# Patient Record
Sex: FEMALE | Race: WHITE | NOT HISPANIC OR LATINO | Employment: FULL TIME | URBAN - METROPOLITAN AREA
[De-identification: names, ages, dates, MRNs, and addresses within clinical notes are randomized per-mention and may not be internally consistent; named-entity substitution may affect disease eponyms.]

---

## 2019-02-03 ENCOUNTER — HOSPITAL ENCOUNTER (EMERGENCY)
Facility: CLINIC | Age: 34
Discharge: PSYCHIATRIC OR SUBSTANCE USE FACILITY | End: 2019-02-03
Attending: EMERGENCY MEDICINE | Admitting: EMERGENCY MEDICINE
Payer: COMMERCIAL

## 2019-02-03 ENCOUNTER — HOSPITAL ENCOUNTER (INPATIENT)
Facility: CLINIC | Age: 34
LOS: 2 days | Discharge: HOME | DRG: 885 | End: 2019-02-05
Attending: PSYCHIATRY & NEUROLOGY | Admitting: PSYCHIATRY & NEUROLOGY
Payer: COMMERCIAL

## 2019-02-03 VITALS
HEART RATE: 106 BPM | RESPIRATION RATE: 20 BRPM | DIASTOLIC BLOOD PRESSURE: 90 MMHG | TEMPERATURE: 100 F | OXYGEN SATURATION: 97 % | SYSTOLIC BLOOD PRESSURE: 112 MMHG

## 2019-02-03 DIAGNOSIS — F32.A DEPRESSION, UNSPECIFIED DEPRESSION TYPE: ICD-10-CM

## 2019-02-03 DIAGNOSIS — F39 EPISODIC MOOD DISORDER (H): ICD-10-CM

## 2019-02-03 DIAGNOSIS — F23 BRIEF PSYCHOTIC DISORDER (H): Primary | ICD-10-CM

## 2019-02-03 PROBLEM — F99 MENTAL HEALTH DISORDER: Status: ACTIVE | Noted: 2019-02-03

## 2019-02-03 LAB
AMPHETAMINES UR QL SCN: NEGATIVE
BARBITURATES UR QL: NEGATIVE
BENZODIAZ UR QL: NEGATIVE
CANNABINOIDS UR QL SCN: NEGATIVE
COCAINE UR QL: NEGATIVE
HCG UR QL: NEGATIVE
OPIATES UR QL SCN: NEGATIVE
PCP UR QL SCN: NEGATIVE

## 2019-02-03 PROCEDURE — 12400001 ZZH R&B MH UMMC

## 2019-02-03 PROCEDURE — 99285 EMERGENCY DEPT VISIT HI MDM: CPT | Mod: 25

## 2019-02-03 PROCEDURE — 80307 DRUG TEST PRSMV CHEM ANLYZR: CPT | Performed by: EMERGENCY MEDICINE

## 2019-02-03 PROCEDURE — 81025 URINE PREGNANCY TEST: CPT | Performed by: EMERGENCY MEDICINE

## 2019-02-03 PROCEDURE — 25000132 ZZH RX MED GY IP 250 OP 250 PS 637: Performed by: EMERGENCY MEDICINE

## 2019-02-03 PROCEDURE — 25000132 ZZH RX MED GY IP 250 OP 250 PS 637: Performed by: PSYCHIATRY & NEUROLOGY

## 2019-02-03 PROCEDURE — 90791 PSYCH DIAGNOSTIC EVALUATION: CPT

## 2019-02-03 RX ORDER — OLANZAPINE 10 MG/2ML
10 INJECTION, POWDER, FOR SOLUTION INTRAMUSCULAR
Status: DISCONTINUED | OUTPATIENT
Start: 2019-02-03 | End: 2019-02-05 | Stop reason: HOSPADM

## 2019-02-03 RX ORDER — TRAZODONE HYDROCHLORIDE 50 MG/1
50 TABLET, FILM COATED ORAL
Status: DISCONTINUED | OUTPATIENT
Start: 2019-02-03 | End: 2019-02-03

## 2019-02-03 RX ORDER — TRAZODONE HYDROCHLORIDE 50 MG/1
50 TABLET, FILM COATED ORAL AT BEDTIME
Status: ON HOLD | COMMUNITY
Start: 2015-12-11 | End: 2019-02-05

## 2019-02-03 RX ORDER — TRAZODONE HYDROCHLORIDE 50 MG/1
50 TABLET, FILM COATED ORAL AT BEDTIME
Status: DISCONTINUED | OUTPATIENT
Start: 2019-02-03 | End: 2019-02-05 | Stop reason: HOSPADM

## 2019-02-03 RX ORDER — ESCITALOPRAM OXALATE 10 MG/1
10 TABLET ORAL
Status: ON HOLD | COMMUNITY
Start: 2017-03-10 | End: 2019-02-05

## 2019-02-03 RX ORDER — ESCITALOPRAM OXALATE 10 MG/1
10 TABLET ORAL AT BEDTIME
Status: DISCONTINUED | OUTPATIENT
Start: 2019-02-03 | End: 2019-02-03

## 2019-02-03 RX ORDER — TRAZODONE HYDROCHLORIDE 50 MG/1
50 TABLET, FILM COATED ORAL AT BEDTIME
Status: DISCONTINUED | OUTPATIENT
Start: 2019-02-03 | End: 2019-02-03

## 2019-02-03 RX ORDER — TRAZODONE HYDROCHLORIDE 50 MG/1
50 TABLET, FILM COATED ORAL AT BEDTIME
Status: DISCONTINUED | OUTPATIENT
Start: 2019-02-03 | End: 2019-02-03 | Stop reason: HOSPADM

## 2019-02-03 RX ORDER — OLANZAPINE 10 MG/1
10 TABLET, ORALLY DISINTEGRATING ORAL
Status: COMPLETED | OUTPATIENT
Start: 2019-02-03 | End: 2019-02-03

## 2019-02-03 RX ORDER — OLANZAPINE 10 MG/2ML
10 INJECTION, POWDER, FOR SOLUTION INTRAMUSCULAR
Status: COMPLETED | OUTPATIENT
Start: 2019-02-03 | End: 2019-02-03

## 2019-02-03 RX ORDER — ESCITALOPRAM OXALATE 10 MG/1
10 TABLET ORAL DAILY
Status: DISCONTINUED | OUTPATIENT
Start: 2019-02-04 | End: 2019-02-05 | Stop reason: HOSPADM

## 2019-02-03 RX ORDER — BISACODYL 10 MG
10 SUPPOSITORY, RECTAL RECTAL DAILY PRN
Status: DISCONTINUED | OUTPATIENT
Start: 2019-02-03 | End: 2019-02-05 | Stop reason: HOSPADM

## 2019-02-03 RX ORDER — ALUMINA, MAGNESIA, AND SIMETHICONE 2400; 2400; 240 MG/30ML; MG/30ML; MG/30ML
30 SUSPENSION ORAL EVERY 4 HOURS PRN
Status: DISCONTINUED | OUTPATIENT
Start: 2019-02-03 | End: 2019-02-05 | Stop reason: HOSPADM

## 2019-02-03 RX ORDER — HYDROXYZINE HYDROCHLORIDE 25 MG/1
25 TABLET, FILM COATED ORAL EVERY 4 HOURS PRN
Status: DISCONTINUED | OUTPATIENT
Start: 2019-02-03 | End: 2019-02-05 | Stop reason: HOSPADM

## 2019-02-03 RX ORDER — OLANZAPINE 10 MG/1
10 TABLET ORAL
Status: DISCONTINUED | OUTPATIENT
Start: 2019-02-03 | End: 2019-02-05 | Stop reason: HOSPADM

## 2019-02-03 RX ORDER — CITALOPRAM HYDROBROMIDE 10 MG/1
10 TABLET ORAL DAILY
Status: DISCONTINUED | OUTPATIENT
Start: 2019-02-04 | End: 2019-02-03

## 2019-02-03 RX ORDER — ACETAMINOPHEN 325 MG/1
650 TABLET ORAL EVERY 4 HOURS PRN
Status: DISCONTINUED | OUTPATIENT
Start: 2019-02-03 | End: 2019-02-05 | Stop reason: HOSPADM

## 2019-02-03 RX ADMIN — HYDROXYZINE HYDROCHLORIDE 25 MG: 25 TABLET ORAL at 21:53

## 2019-02-03 RX ADMIN — OLANZAPINE 10 MG: 10 TABLET, ORALLY DISINTEGRATING ORAL at 14:44

## 2019-02-03 ASSESSMENT — ACTIVITIES OF DAILY LIVING (ADL)
DRESS: 0-->INDEPENDENT
RETIRED_COMMUNICATION: 0-->UNDERSTANDS/COMMUNICATES WITHOUT DIFFICULTY
AMBULATION: 0-->INDEPENDENT
SWALLOWING: 0-->SWALLOWS FOODS/LIQUIDS WITHOUT DIFFICULTY
FALL_HISTORY_WITHIN_LAST_SIX_MONTHS: NO
DRESS: INDEPENDENT
TOILETING: 0-->INDEPENDENT
BATHING: 0-->INDEPENDENT
HYGIENE/GROOMING: INDEPENDENT
COGNITION: 0 - NO COGNITION ISSUES REPORTED
ORAL_HYGIENE: INDEPENDENT
RETIRED_EATING: 0-->INDEPENDENT
TRANSFERRING: 0-->INDEPENDENT

## 2019-02-03 ASSESSMENT — MIFFLIN-ST. JEOR: SCORE: 1327.76
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
## 2019-02-03 NOTE — ED PROVIDER NOTES
Patient signed out to myself awaiting admission.  There is a bed over at [place].  Nurse notes that she is been having abdominal pain.  I reviewed her chart did not see any mention of abdominal pain or abdominal workups.  Went back to see her with her  at her bedside.  Patient appears quite sleepy with a very flat affect.  She is vague with her history of abdominal pain and is unclear as to how long it has been going on.  She denies any melena or coffee-ground emesis denies diarrhea or constipation but again is quite sleepy when answering questions.  On exam she has some mild left-sided abdominal pain.  She notes that food seems to help her symptoms.  She denies any fatty greasy food intolerance.  She had no prior surgeries.  She is not having significant symptoms right now do not feel she needs an abdominal workup at this time.  She was given a courtesy meal and I see a good portion of it has been eaten.  Urine pregnancy will be added on.     Rusty Max MD  04/03/19 7827

## 2019-02-03 NOTE — ED NOTES
PT c/o abd pain at this time. Pt reports has had it off and on for quite a while. MD notified and at bedside.

## 2019-02-04 LAB
ALBUMIN SERPL-MCNC: 4 G/DL (ref 3.4–5)
ALP SERPL-CCNC: 43 U/L (ref 40–150)
ALT SERPL W P-5'-P-CCNC: 15 U/L (ref 0–50)
ANION GAP SERPL CALCULATED.3IONS-SCNC: 6 MMOL/L (ref 3–14)
AST SERPL W P-5'-P-CCNC: 17 U/L (ref 0–45)
B-HCG SERPL-ACNC: <1 IU/L (ref 0–5)
BASOPHILS # BLD AUTO: 0 10E9/L (ref 0–0.2)
BASOPHILS NFR BLD AUTO: 0.5 %
BILIRUB SERPL-MCNC: 0.7 MG/DL (ref 0.2–1.3)
BUN SERPL-MCNC: 12 MG/DL (ref 7–30)
CALCIUM SERPL-MCNC: 8.6 MG/DL (ref 8.5–10.1)
CHLORIDE SERPL-SCNC: 107 MMOL/L (ref 94–109)
CHOLEST SERPL-MCNC: 176 MG/DL
CO2 SERPL-SCNC: 27 MMOL/L (ref 20–32)
CREAT SERPL-MCNC: 0.8 MG/DL (ref 0.52–1.04)
DIFFERENTIAL METHOD BLD: NORMAL
EOSINOPHIL # BLD AUTO: 0.1 10E9/L (ref 0–0.7)
EOSINOPHIL NFR BLD AUTO: 1.3 %
ERYTHROCYTE [DISTWIDTH] IN BLOOD BY AUTOMATED COUNT: 12.2 % (ref 10–15)
GFR SERPL CREATININE-BSD FRML MDRD: >90 ML/MIN/{1.73_M2}
GLUCOSE SERPL-MCNC: 95 MG/DL (ref 70–99)
HCT VFR BLD AUTO: 44.3 % (ref 35–47)
HDLC SERPL-MCNC: 60 MG/DL
HGB BLD-MCNC: 14.7 G/DL (ref 11.7–15.7)
IMM GRANULOCYTES # BLD: 0 10E9/L (ref 0–0.4)
IMM GRANULOCYTES NFR BLD: 0 %
LDLC SERPL CALC-MCNC: 102 MG/DL
LYMPHOCYTES # BLD AUTO: 2.1 10E9/L (ref 0.8–5.3)
LYMPHOCYTES NFR BLD AUTO: 34.1 %
MCH RBC QN AUTO: 30.2 PG (ref 26.5–33)
MCHC RBC AUTO-ENTMCNC: 33.2 G/DL (ref 31.5–36.5)
MCV RBC AUTO: 91 FL (ref 78–100)
MONOCYTES # BLD AUTO: 0.4 10E9/L (ref 0–1.3)
MONOCYTES NFR BLD AUTO: 6.3 %
NEUTROPHILS # BLD AUTO: 3.6 10E9/L (ref 1.6–8.3)
NEUTROPHILS NFR BLD AUTO: 57.8 %
NONHDLC SERPL-MCNC: 116 MG/DL
NRBC # BLD AUTO: 0 10*3/UL
NRBC BLD AUTO-RTO: 0 /100
PLATELET # BLD AUTO: 224 10E9/L (ref 150–450)
POTASSIUM SERPL-SCNC: 4.2 MMOL/L (ref 3.4–5.3)
PROT SERPL-MCNC: 7.2 G/DL (ref 6.8–8.8)
RBC # BLD AUTO: 4.87 10E12/L (ref 3.8–5.2)
SODIUM SERPL-SCNC: 140 MMOL/L (ref 133–144)
TRIGL SERPL-MCNC: 72 MG/DL
TSH SERPL DL<=0.005 MIU/L-ACNC: 2.31 MU/L (ref 0.4–4)
VIT B12 SERPL-MCNC: 475 PG/ML (ref 193–986)
WBC # BLD AUTO: 6.2 10E9/L (ref 4–11)

## 2019-02-04 PROCEDURE — G0177 OPPS/PHP; TRAIN & EDUC SERV: HCPCS

## 2019-02-04 PROCEDURE — 82607 VITAMIN B-12: CPT | Performed by: PSYCHIATRY & NEUROLOGY

## 2019-02-04 PROCEDURE — 80061 LIPID PANEL: CPT | Performed by: PSYCHIATRY & NEUROLOGY

## 2019-02-04 PROCEDURE — 12400001 ZZH R&B MH UMMC

## 2019-02-04 PROCEDURE — 36415 COLL VENOUS BLD VENIPUNCTURE: CPT | Performed by: PSYCHIATRY & NEUROLOGY

## 2019-02-04 PROCEDURE — 80053 COMPREHEN METABOLIC PANEL: CPT | Performed by: PSYCHIATRY & NEUROLOGY

## 2019-02-04 PROCEDURE — 84702 CHORIONIC GONADOTROPIN TEST: CPT | Performed by: PSYCHIATRY & NEUROLOGY

## 2019-02-04 PROCEDURE — 25000132 ZZH RX MED GY IP 250 OP 250 PS 637: Performed by: PSYCHIATRY & NEUROLOGY

## 2019-02-04 PROCEDURE — 85025 COMPLETE CBC W/AUTO DIFF WBC: CPT | Performed by: PSYCHIATRY & NEUROLOGY

## 2019-02-04 PROCEDURE — 82306 VITAMIN D 25 HYDROXY: CPT | Performed by: PSYCHIATRY & NEUROLOGY

## 2019-02-04 PROCEDURE — 99222 1ST HOSP IP/OBS MODERATE 55: CPT | Mod: AI | Performed by: PSYCHIATRY & NEUROLOGY

## 2019-02-04 PROCEDURE — 84443 ASSAY THYROID STIM HORMONE: CPT | Performed by: PSYCHIATRY & NEUROLOGY

## 2019-02-04 RX ORDER — QUETIAPINE FUMARATE 50 MG/1
100 TABLET, FILM COATED ORAL
Status: DISCONTINUED | OUTPATIENT
Start: 2019-02-04 | End: 2019-02-05 | Stop reason: HOSPADM

## 2019-02-04 RX ADMIN — TRAZODONE HYDROCHLORIDE 50 MG: 50 TABLET ORAL at 21:22

## 2019-02-04 RX ADMIN — HYDROXYZINE HYDROCHLORIDE 25 MG: 25 TABLET ORAL at 21:24

## 2019-02-04 RX ADMIN — ESCITALOPRAM OXALATE 10 MG: 10 TABLET ORAL at 09:05

## 2019-02-04 ASSESSMENT — ACTIVITIES OF DAILY LIVING (ADL)
HYGIENE/GROOMING: INDEPENDENT
LAUNDRY: WITH SUPERVISION
DRESS: STREET CLOTHES;SCRUBS (BEHAVIORAL HEALTH)
ORAL_HYGIENE: INDEPENDENT

## 2019-02-04 NOTE — PROGRESS NOTES
"SPIRITUAL HEALTH SERVICES  SPIRITUAL ASSESSMENT Progress Note  [facility]     REFERRAL SOURCE: Patient    I met with Fiorella. She said she originally requested to meet with a  because she \"wasn't feeling good when she first arrived. I'm feeling much better now, so I really don't need to talk.\"  I shared with her that I was happy she was feeling better and should she change her mind about wishing to talk for any reason, just let her nurse know.    PLAN: [facility] will continue to be available for spiritual support while the patient is in the hospital.    Brinda Izquierdo  Chaplain Resident  Pager [phone]    "

## 2019-02-04 NOTE — PLAN OF CARE
"  INITIAL OT NOTE    Groups Attended: OT Clinic x2, Wellness    Affect/Hygiene/Presentation: Pleasant mood with a congruent affect, however Pt appeared to be slightly guarded. No apparent hygiene concerns.     Patient Performance/Response:  -Clinic: Pt actively participated in occupational therapy clinic. Pt was able to ask for assistance as needed, and independently initiate a familiar, creative expression task after initial orientation to clinic materials was provided by writer. Pt demonstrated good focus and planning during task completion. Pt appeared comfortable minimally interacting with peers and writer when conversation was initiated with her. However, she generally kept to herself and appeared guarded.   -Wellness: Pt actively participated in a mental health management group with a focus on coping through movement to facilitate relaxation and stress management via chair yoga. Pt followed and engaged in the yoga poses, and verbalized feeling calmer at the end of group. Pt contributed at least one idea to a discussion at the end of group regarding the benefits of exercise, stretching, and deep breathing.     Plan: Will continue to assess, initial assessment and OT care plan/goals to be initiated upon additional group participation.       OT SELF-ASSESSMENT  Pt completed a self-assessment form this date. OT staff reviewed with Pt and explained the value of having them involved in their treatment plan, and provided options to meet current needs/self-identified goals.     What do you do during the day/evening?   \"During day - work, [employer], make soap\"    What stressors do you face that trigger symptoms/use?  \"Overworking, forgetting to take care of myself\"    Who are supportive people who you enjoy spending time with?  \"My family, , co-workers, friends\"    What are your positive qualities/strengths?  \"Hobbies - soap making, organized, getting along well with others\"    What helps you to calm down or " "relax?  \"Exercise, hobbies\"    Please select coping skills that you would like to explore in OT:  -Guided relaxation  -Physical wellness/exercise/yoga/Djiboutian chi  -Arts and crafts/woodworking    What are your goals for when you leave the hospital?  \"Manage work stress\"    Please select goals that you would like to work on in OT to reach your goal:  -Engage in OT group activities that support recovery  -Communicate needs effectively     "

## 2019-02-04 NOTE — PLAN OF CARE
"Pt has been in the milieu all shift. Pt went to several groups. Pleasant and cooperative. Pt hopes to discharge home within the next couple days. Pt lives with her . Pt states the date is Monday feb 4th and that she lives in [place]. Polite. Rates depression and anxiety 4/10 with 10 being high and states she \"feels pretty good\" Pt states her concentration is \"good\" and has been looking at a magazine throughout the shift. Pt feels hopeful. Denies SI and SIB. Appetite and sleep are good. Denies pain.  Poor grooming.   "

## 2019-02-04 NOTE — PLAN OF CARE
BEHAVIORAL TEAM DISCUSSION    Participants: Gloria TAYLOR RN, Jessica JOSEPH and Shon Hills MD  Progress: Initial behavioral team discussion.   Continued Stay Criteria/Rationale: Pt admitted on 72 hour hold (expires 2/6/19 @ 17:36 pm) due to psychotic symptomology.   Medical/Physical: See medical consult.   Precautions:   Behavioral Orders   Procedures    Code 1 - Restrict to Unit    Routine Programming     As clinically indicated    Sexual precautions     Pt made sexually inappropriate comments at the [facility] ED.    Status 15     Every 15 minutes.     Plan: The plan is to assess the patient for mental health and medication needs.  The patient will be prescribed medications to treat the identified symptoms.  Upon discharge the patient will be referred to services as appropriate based on the assessment.  Rationale for change in precautions or plan: No change, initial plan of care.

## 2019-02-04 NOTE — PROGRESS NOTES
02/03/19 2145   Patient Belongings   Did you bring any home meds/supplements to the hospital?  No   Patient Belongings locker   Patient Belongings Put in Hospital Secure Location (Security or Locker, etc.) clothing;shoes   Belongings Search Yes   Clothing Search Yes   Second Staff Josy Kelley w/ strings  Leggings  Socks  Boots   A               Admission:  I am responsible for any personal items that are not sent to the safe or pharmacy.  [facility] is not responsible for loss, theft or damage of any property in my possession.    Signature:  _________________________________ Date: _______  Time: _____                                              Staff Signature:  ____________________________ Date: ________  Time: _____      2nd Staff person, if patient is unable/unwilling to sign:    Signature: ________________________________ Date: ________  Time: _____     Discharge:  [facility] has returned all of my personal belongings:    Signature: _________________________________ Date: ________  Time: _____                                          Staff Signature:  ____________________________ Date: ________  Time: _____

## 2019-02-04 NOTE — ED NOTES
Attempted to call report to RN at [place]. Unable to take report at this time. Pt and family updated.

## 2019-02-04 NOTE — PROGRESS NOTES
As of 2/4/19 @ 12:45 pm Pt has signed in as voluntary. ROIs have been signed for  and psychiatry provider [facility].     Writer spoke with Daniel, Pt's  ([phone]) discussed Pt admission. Writer explained that Pt will meet with doctor today, and we should know more about a treatment plan later today or tomorrow. Agreed to check-in with Daniel tomorrow. Provided direct contact information for any further questions or concerns.

## 2019-02-04 NOTE — PLAN OF CARE
Fiorella is a 33 year-old female who is admitted from the [facility] ED due to worsening depression and psychosis. According to report from the ED nurse and chart review,  Fiorella was bib  and sister due to psychotic behavior.  Pt has been decompensating for a week now- not sleeping well.  Got lost on the way home from the gym and needed guidance from .  Pt is taking awhile to process thoughts and often doesn't answer questions, rather she stares and smiles. When she responds she often answers inappropriately to the question. Took a double dose of trazodone so she could sleep but woke up and was wandering and pacing around the house.  Pt had similar episode a few years ago and was hospitalized at [facility] diagnosed with MDD with psychotic features.      Upon admission pt presents with a depressed mood and blunted affect. Denies suicidal ideation or suicide attempt. Pt presents with thought blocking, memory loss and tearfulness. Is disheveled. Pt unable to recall her own address and her  phone number at this time. Pt cooperative and polite.  Plan: Status 15s; Build trust with pt. Continue to build on strengths. Encourage healthy coping.     Admission Notification:     Her  ( Daniel) filled out the concerned person form for Team.
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
## 2019-02-05 VITALS
BODY MASS INDEX: 24.24 KG/M2 | HEART RATE: 89 BPM | HEIGHT: 64 IN | TEMPERATURE: 97.3 F | RESPIRATION RATE: 16 BRPM | SYSTOLIC BLOOD PRESSURE: 129 MMHG | DIASTOLIC BLOOD PRESSURE: 86 MMHG | WEIGHT: 142 LBS | OXYGEN SATURATION: 98 %

## 2019-02-05 LAB — DEPRECATED CALCIDIOL+CALCIFEROL SERPL-MC: 34 UG/L (ref 20–75)

## 2019-02-05 PROCEDURE — G0177 OPPS/PHP; TRAIN & EDUC SERV: HCPCS

## 2019-02-05 PROCEDURE — 25000132 ZZH RX MED GY IP 250 OP 250 PS 637: Performed by: PSYCHIATRY & NEUROLOGY

## 2019-02-05 RX ORDER — TRAZODONE HYDROCHLORIDE 50 MG/1
50 TABLET, FILM COATED ORAL AT BEDTIME
Qty: 30 TABLET | Refills: 1 | Status: ON HOLD | OUTPATIENT
Start: 2019-02-05 | End: 2019-04-08

## 2019-02-05 RX ORDER — HYDROXYZINE HYDROCHLORIDE 25 MG/1
25 TABLET, FILM COATED ORAL EVERY 4 HOURS PRN
Qty: 60 TABLET | Refills: 1 | Status: ON HOLD | OUTPATIENT
Start: 2019-02-05 | End: 2019-04-08

## 2019-02-05 RX ORDER — QUETIAPINE FUMARATE 25 MG/1
12.5 TABLET, FILM COATED ORAL 3 TIMES DAILY PRN
Qty: 15 TABLET | Refills: 1 | Status: ON HOLD | OUTPATIENT
Start: 2019-02-05 | End: 2019-04-08

## 2019-02-05 RX ORDER — ESCITALOPRAM OXALATE 10 MG/1
10 TABLET ORAL DAILY
Qty: 30 TABLET | Refills: 1 | Status: SHIPPED | OUTPATIENT
Start: 2019-02-05 | End: 2019-03-23

## 2019-02-05 RX ADMIN — ESCITALOPRAM OXALATE 10 MG: 10 TABLET ORAL at 08:30

## 2019-02-05 RX ADMIN — HYDROXYZINE HYDROCHLORIDE 25 MG: 25 TABLET ORAL at 18:48

## 2019-02-05 ASSESSMENT — ACTIVITIES OF DAILY LIVING (ADL)
LAUNDRY: WITH SUPERVISION
DRESS: STREET CLOTHES
ORAL_HYGIENE: INDEPENDENT
HYGIENE/GROOMING: INDEPENDENT

## 2019-02-05 ASSESSMENT — MIFFLIN-ST. JEOR: SCORE: 1334.11

## 2019-02-05 NOTE — PROGRESS NOTES
02/04/19 1938   Behavioral Health   Hallucinations denies / not responding to hallucinations   Thinking poor concentration   Orientation time: oriented;date: oriented;place: oriented   Memory baseline memory   Insight insight appropriate to events   Judgement impaired   Eye Contact at examiner   Affect blunted, flat   Mood mood is calm   Physical Appearance/Attire attire appropriate to age and situation   Hygiene neglected grooming - unclean body, hair, teeth   Suicidality (denied )   1. Wish to be Dead No   2. Non-Specific Active Suicidal Thoughts  No   Self Injury (denied )   Elopement (denied )   Activity withdrawn;isolative   Speech coherent;clear   Medication Sensitivity no observed side effects;no stated side effects   Psychomotor / Gait balanced;steady   Psycho Education   Type of Intervention 1:1 intervention   Response participates, initiates socially appropriate   Hours 0.5   Activities of Daily Living   Hygiene/Grooming independent   Oral Hygiene independent   Dress street clothes;scrubs (behavioral health)   Laundry with supervision   Room Organization independent   Activity   Activity Assistance Provided independent     Pt was calm and cooperative with blunted mood affect, isolative and withdrawn from peers. She was visibile in milieu watching tv. And reading. She denied all mental health symptoms including SI/SIB. She had one visitor. She stated that she is feeling much better than the past a few days, but she thinks she is not ready to discharge yet.  Her appetite was good and sleeping and napping well.

## 2019-02-05 NOTE — PLAN OF CARE
"  OT General Care Plan  OT Goal 1  Description  Within 1 week, Pt will communicate needs effectively >2x for increased assertive communication.    2/5/2019 1429 - Improving by Xenia Pelayo OT     Groups Attended: OT Clinic, Life Skills, Mental Health Management     Affect/Hygiene/Presentation: Pleasant mood, engaged, congruent affect. No apparent hygiene concerns.     Patient Performance/Response:  -Clinic: Pt actively participated in occupational therapy clinic. Pt was able to ask for assistance as needed, and independently initiate a novel, goal-directed task without difficulty. Pt demonstrated good focus, planning, and problem solving during task completion. Pt appeared comfortable interacting with peers and writer, however generally kept to herself and appeared focused on her selected task.  -Life Skills: Pt actively participated in a mental health management group involving identification of coping skills beginning with every letter of the alphabet facilitated through group discussion. Pt consistently contributed ideas of positive coping skills, and was receptive and respectful of ideas contributed by peers. Pt identified coping skills that currently work for her including \"deep breathing\" and that she would like to try to utilize \"the 30681 grounding exercise, and making a short list for the day\".   -Mental Health Management: Pt actively participated in a structured occupational therapy group with a focus on a visual-spatial leisure task. Pt was able to follow 2-step directions of the novel task, and demonstrated strategic planning and problem solving throughout task. Pt remained focused for full duration of group and assisted peers as needed to succeed during task.    Goal Progress: Goal initiated this date based on Pt's self-identified needs/goals for discharge.     Plan: Pt will be encouraged to maintain group attendance for continued ongoing assessment and support in completion of occupational " therapy treatment goals.

## 2019-02-05 NOTE — PROGRESS NOTES
Pt said that she wants to be discharged today. Pt said depression and anxiety are much less and denies SO or SIB     02/05/19 1207   Sleep/Rest/Relaxation   Sleep/Rest/Relaxation no problem identified   Behavioral Health   Hallucinations denies / not responding to hallucinations   Thinking intact   Orientation time: oriented;place: oriented;person: oriented;date: oriented   Memory baseline memory   Insight insight appropriate to situation   Judgement intact   Eye Contact at examiner   Affect full range affect   Mood mood is calm   Physical Appearance/Attire neat   Hygiene well groomed   Suicidality other (see comments)  (denies)   Self Injury other (see comment)  (denies)   Elopement (no)   Activity restless   Speech clear;coherent   Psychomotor / Gait balanced;steady   Coping/Psychosocial   Verbalized Emotional State hopefulness;happiness;relief;acceptance   Coping/Psychosocial Interventions   Supportive Measures verbalization of feelings encouraged   Psycho Education   Type of Intervention 1:1 intervention   Response participates with encouragement   Hours 0.5   Treatment Detail (encouragement and acceptance)   Safety   Suicidality Status 15   Activities of Daily Living   Hygiene/Grooming independent   Oral Hygiene independent   Dress street clothes   Laundry with supervision   Room Organization independent   Behavioral Health Interventions   Social and Therapeutic Interventions (Psychotic Symptoms) encourage socialization with peers;encourage participation in therapeutic groups and milieu activities

## 2019-02-05 NOTE — PROGRESS NOTES
Case Management Note  2/5/2019    Spoke with pt's  Daniel ([phone]). Daniel had left [facility] requesting a call. Daniel reported he noticed symptoms beginning late last week. Daniel reported he noticed disturbed sleep, pt was overwhelmed, etc. Daniel requested information on how to respond to this in the future. Daniel reported he told pt what he was noticing, but he didn't think it helped, rather made her feel more overhwhelmed. Writer validated Daniel's concerns, and normalized Daniel's experience that pointing out pt's symptoms only added to stress. Writer suggested pt,  and anyone else supportive (mom, sister) work with therapist and/or psychiatrist to create action plan and all agree to it. Writer also discussed this generally with pt. Writer suggested to have an action plan next time symptoms arise so pt feels she has a plan to address the symptoms rather than feeling it is just another problem added to her plate when she is overwhelmed. Daniel indicated understanding. Daniel reported he can't pick pt up until after 5:30, felt okay with time of 6:30 for pickup.     Writer spoke with pt about discharge. Writer offered to make pt's next therapy appointment, pt felt she could do it herself. Pt understood the above information re: creating action plan. Writer encouraged pt to maintain awareness of sleep/eating habits, as these both became disturbed shortly prior to pt's hospitalization this time as well as her previous hospitalization. Pt reported that prior to both hospitalizations she was triggered by covering for her boss, feeling stressed, overwhelmed, not eating lunch, not sleeping. Pt reported Trazodone was ineffective at these times. Writer encouraged pt to discuss these issues with her psychiatrist. Writer offered to have pt's psychiatry appointment moved up (it had been scheduled prior to admission for 3/18), pt declined. Writer discussed pt's plan to go back to work. Pt reported she wants to return  to work, but reports she understands she maybe shouldn't rush back. Pt reported her work is understanding. Writer will provide pt with letter verifying pt's admission and excusing pt from work until 2/7/19.     Maria E Escalante LPC, [facility]

## 2019-02-05 NOTE — CONSULTS
Consult Date:  02/04/2019      CHIEF COMPLAINT:  A 33-year-old woman admitted with symptoms of psychosis.      HISTORY OF PRESENT ILLNESS:  The patient is a 33-year-old woman who presents for her third hospitalization.  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  She was highly stressed.  She was not sleeping well at night, and she began to develop some psychotic symptoms.  She was disorganized.  At one point in time, she called her  saying she got lost and needed help.  She was making paranoid statements while they were driving in the car.  In the emergency room, she was somewhat evasive, answering questions in odd ways, or else not answering them at all.  She was placed on a hold and admitted to the psychiatric unit.      When I meet with the patient, she appeared somewhat anxious, but overall was doing much better.  She was clear.  She was fully oriented.  She does not really recall all the details in the last several days, but does know that she was feeling like her thoughts were a little bit jumbled.  She said this is the third time this has happened to her.  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  She thought she prepared better this time, but it turns out that she was not recovered.  Her first hospitalization, she was initially given risperidone, but the cost of this medication was not covered at hospital discharge, so she was switched to Seroquel, but she did not take it very long.  At her last hospitalization, she was prescribed Geodon.  She ended up staying on that for about 6 months and then tapered off.  Has had no problems in the last 2 years until just now.  She reports that her trazodone was not working for sleep these last several days.      PAST PSYCHIATRIC HISTORY:  As  noted in HPI.  This is her third hospitalization.  Denies ever having a suicide attempt.      PAST MEDICAL HISTORY:  The patient denies any chronic or acute medical issues.      SUBSTANCE HISTORY:  The patient denies tobacco, alcohol, or illicit substance use.      PHYSICAL REVIEW OF SYSTEMS:  The patient currently denies problems on a 10-point review of systems except as noted in HPI.      FAMILY HISTORY:  She denies any history of mental illness.      SOCIAL HISTORY:  The patient is .  She has no children.  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  She indicates that there are about 60 people working there total.  She states that she is effectively covering 2 jobs right now while her supervisor is gone.      ALLERGIES:  NO KNOWN DRUG ALLERGIES.      PRIOR TO ADMISSION MEDICATIONS:  Lexapro 10 mg at night, trazodone 50 mg at bedtime.      LABORATORY RESULTS:  Comprehensive metabolic panel within normal limits.  Urine pregnancy is negative.  Lipid panel largely within normal limits.  TSH is 2.31.  Glucose is 95.  CBC with differential within normal limits.  Urine toxicology is negative for amphetamines, cocaine, opiates, cannabinoids, PCP, benzodiazepines.      VITAL SIGNS:  Temperature 97.6, pulse of 82, respiratory rate 16, blood pressure 122/66, oxygen saturation 97% on room air.      PHYSICAL EXAMINATION:  I reviewed physical exam as documented by emergency room physician, Juno Cintron dated 2/3/2019.  I have no additional findings at this time.      MENTAL HEALTH STATUS EXAMINATION:  The patient is alert and oriented to person, place, and date.  She is casually dressed with appropriate grooming.  She has good eye contact.  She is cooperative to interview.  Speech is normal in rate and volume.  Language is intact for word usage and sentence structure.  Mood was mildly anxious, otherwise euthymic.  Affect is congruent to mood.  She has  no psychomotor agitation or retardation.  Muscle strength and tone and gait and station are all within normal limits.  Thought process is linear and goal oriented.  Associations are intact.  Thought content currently negative for suicidal or homicidal thoughts.  No signs of psychosis.  The patient denies hallucinations.  Recent and remote memory are somewhat impaired surrounding the last few days, otherwise intact.  Fund of knowledge is adequate.  Attention and concentration intact.  Insight intact, judgment intact.      DIAGNOSES:   1.  Brief psychotic episode consistent with picture of delirium.   2.  History of depression.      PLAN:   1.  Will continue patient's prior to admission Lexapro and trazodone.  We will add Seroquel to be available as needed at 100 mg, should the trazodone be inadequate for her sleep.   2.  Olanzapine is available as needed for psychosis.     3.  Legal: The patient was admitted on a 72-hour hold.  Prior to me seeing the patient, nursing staff had the patient sign in voluntarily.  Currently, she appears to have the capacity to do so, thus will leave her at this status at this time.   4.  Disposition:  I anticipate patient will discharge to home after some more observation and more collateral is obtained, likely a 3-4 day hospitalization, should her current picture continue.         DOMENICO MILLER MD             D: 2019   T: 2019   MT: LORIE      Name:     RYANNE PALACIO   MRN:      [id number]        Account:       [id number]   :      1985           Consult Date:  2019      Document: [id number]

## 2019-02-05 NOTE — PROGRESS NOTES
INITIAL OT ASSESSMENT     02/04/19 8327   General Information   Date Initially Attended OT 02/04/19   Clinical Impression   Affect Fluctuates;Appropriate to situation;Anxious;Flat   Orientation Oriented to person, place and time   Appearance and ADLs Neatly groomed   Attention to Internal Stimuli No observed signs   Interaction Skills Interacts appropriately with staff;Initiates appropriately with staff;Interacts appropriately with peers;Initiates appropriately with peers   Ability to Communicate Needs Does so with prompts   Verbal Content Appropriate to topic   Ability to Maintain Boundaries Maintains appropriate physical boundaries;Maintains appropriate verbal boundaries   Participation Initiates participation;Independently participates   Concentration Concentrates 50 minutes   Ability to Concentrate Without difficulty   Follows and Comprehends Directions Independently follows multi-step directions   Memory Delayed and immediate recall intact   Organization Independently organizes all tasks   Decision Making Independent   Planning and Problem Solving Occasionally needs assist/feedback   Ability to Apply and Learn Concepts Applies within group structure   Frustrations / Stress Tolerance Independently identifies skills    Level of Insight Identifies needs with structure/support   Self Esteem Can identify positives;Takes risks with support and encouragement;Accepts positive feedback   Social Supports Has knowledge of support systems

## 2019-02-06 NOTE — PLAN OF CARE
Patient discharged at this time to home. Reviewed and verbalized understanding of discharge instructions. Pt left with copy of AVS and all her belongings. [facility] [place]

## 2019-02-08 NOTE — H&P
Admitted:     02/03/2019      Revised      DATE OF ADMISSION:  2/3/2019      DATE OF SERVICE:  2/4/2019      CHIEF COMPLAINT:  A 33-year-old woman admitted with symptoms of psychosis.        HISTORY OF PRESENT ILLNESS:  The patient is a 33-year-old woman who presents for her third hospitalization.  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  She was highly stressed.  She was not sleeping well at night, and she began to develop some psychotic symptoms.  She was disorganized.  At one point in time, she called her  saying she got lost and needed help.  She was making paranoid statements while they were driving in the car.  In the emergency room, she was somewhat evasive, answering questions in odd ways, or else not answering them at all.  She was placed on a hold and admitted to the psychiatric unit.        When I meet with the patient, she appeared somewhat anxious, but overall was doing much better.  She was clear.  She was fully oriented.  She does not really recall all the details in the last several days, but does know that she was feeling like her thoughts were a little bit jumbled.  She said this is the third time this has happened to her.  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  She thought she prepared better this time, but it turns out that she was not recovered.  Her first hospitalization, she was initially given risperidone, but the cost of this medication was not covered at hospital discharge, so she was switched to Seroquel, but she did not take it very long.  At her last hospitalization, she was prescribed Geodon.  She ended up staying on that for about 6 months and then tapered off.  Has had no problems in the last 2 years until just now.  She reports that her trazodone was  not working for sleep these last several days.        PAST PSYCHIATRIC HISTORY:  As noted in HPI.  This is her third hospitalization.  Denies ever having a suicide attempt.        PAST MEDICAL HISTORY:  The patient denies any chronic or acute medical issues.        SUBSTANCE HISTORY:  The patient denies tobacco, alcohol, or illicit substance use.        PHYSICAL REVIEW OF SYSTEMS:  The patient currently denies problems on a 10-point review of systems except as noted in HPI.        FAMILY HISTORY:  She denies any history of mental illness.        SOCIAL HISTORY:  The patient is .  She has no children.  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  She indicates that there are about 60 people working there total.  She states that she is effectively covering 2 jobs right now while her supervisor is gone.        ALLERGIES:  NO KNOWN DRUG ALLERGIES.        PRIOR TO ADMISSION MEDICATIONS:  Lexapro 10 mg at night, trazodone 50 mg at bedtime.        LABORATORY RESULTS:  Comprehensive metabolic panel within normal limits.  Urine pregnancy is negative.  Lipid panel largely within normal limits.  TSH is 2.31.  Glucose is 95.  CBC with differential within normal limits.  Urine toxicology is negative for amphetamines, cocaine, opiates, cannabinoids, PCP, benzodiazepines.        VITAL SIGNS:  Temperature 97.6, pulse of 82, respiratory rate 16, blood pressure 122/66, oxygen saturation 97% on room air.        PHYSICAL EXAMINATION:  I reviewed physical exam as documented by emergency room physician, Juno Cintron dated 2/3/2019.  I have no additional findings at this time.        MENTAL HEALTH STATUS EXAMINATION:  The patient is alert and oriented to person, place, and date.  She is casually dressed with appropriate grooming.  She has good eye contact.  She is cooperative to interview.  Speech is normal in rate and volume.  Language is intact for word usage  and sentence structure.  Mood was mildly anxious, otherwise euthymic.  Affect is congruent to mood.  She has no psychomotor agitation or retardation.  Muscle strength and tone and gait and station are all within normal limits.  Thought process is linear and goal oriented.  Associations are intact.  Thought content currently negative for suicidal or homicidal thoughts.  No signs of psychosis.  The patient denies hallucinations.  Recent and remote memory are somewhat impaired surrounding the last few days, otherwise intact.  Fund of knowledge is adequate.  Attention and concentration intact.  Insight intact, judgment intact.        DIAGNOSES:    1.  Brief psychotic episode consistent with picture of delirium.    2.  History of depression.        PLAN:    1.  Will continue patient's prior to admission Lexapro and trazodone.  We will add Seroquel to be available as needed at 100 mg, should the trazodone be inadequate for her sleep.    2.  Olanzapine is available as needed for psychosis.      3.  Legal: The patient was admitted on a 72-hour hold.  Prior to me seeing the patient, nursing staff had the patient sign in voluntarily.  Currently, she appears to have the capacity to do so, thus will leave her at this status at this time.    4.  Disposition:  I anticipate patient will discharge to home after some more observation and more collateral is obtained, likely a 3-4 day hospitalization, should her current picture continue.       Revised work type lg 19               DOMENICO MILLER MD             D: 2019   T: 2019   MT: LORIE      Name:     RYANNE PALACIO   MRN:      [id number]        Account:      [id number]   :      1985        Admitted:     2019                   Document: [id number]

## 2019-02-15 ENCOUNTER — HEALTH MAINTENANCE LETTER (OUTPATIENT)
Age: 34
End: 2019-02-15

## 2019-02-18 NOTE — DISCHARGE SUMMARY
[facility], [place]   Psychiatric Discharge Summary      Fiorella Bartlett MRN# [id number]   Age: 33 year old YOB: 1985     Date of Admission:  2/3/2019  Date of Discharge:  02/05/19  Admitting Physician:  Shon Hills MD  Discharge Physician:  Gonzalo Garg MD         Summary/Hospital Course/Disposition:   Reason for Hospitalization: The patient is a 33-year-old woman who presents for her third hospitalization.  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  She was highly stressed.  She was not sleeping well at night, and she began to develop some psychotic symptoms.  She was disorganized.  At one point in time, she called her  saying she got lost and needed help.  She was making paranoid statements while they were driving in the car.  In the emergency room, she was somewhat evasive, answering questions in odd ways, or else not answering them at all.  She was placed on a hold and admitted to the psychiatric unit.           Hospital Course:   The patient was admitted on a 72 hour hold for the reasons above, however she had signed in voluntarily at the beginning of her hospitalization. Patient's PTA Lexapro and Trazodone was continued. Seroquel was added for sleep on a PRN basis. Patient was open about how her mood and thoughts were dysregualted by her interpersonal/occupational stressors, particularly by her boss. Patient's short stay was subjectively beneficial for her, reporting lessened depressive thoughts. Patient is future oriented and plans to follow up with outpatient provider and continue to go to work.          DIagnoses:   1.  Brief psychotic episode   2.  History of depression.               Labs:   No results found for this or any previous visit (from the past 24 hour(s)).         Consults:   None            Discharge Medications:        Review of your medicines      UNREVIEWED medicines. Ask your doctor about these  medicines      Dose / Directions   hydrOXYzine 25 MG tablet  Commonly known as:  ATARAX  Used for:  Brief psychotic disorder (H), Episodic mood disorder (H)  Ask about: Should I take this medication?      Dose:  25 mg  Take 1 tablet (25 mg) by mouth every 4 hours as needed for anxiety  Quantity:  60 tablet  Refills:  1        START taking      Dose / Directions   QUEtiapine 25 MG tablet  Commonly known as:  SEROquel  Used for:  Brief psychotic disorder (H), Episodic mood disorder (H)      Dose:  12.5 mg  Take 0.5 tablets (12.5 mg) by mouth 3 times daily as needed (Take for hightened anxiety episodes)  Quantity:  15 tablet  Refills:  1        CONTINUE these medicines which may have CHANGED, or have new prescriptions. If we are uncertain of the size of tablets/capsules you have at home, strength may be listed as something that might have changed.      Dose / Directions   escitalopram 10 MG tablet  Commonly known as:  LEXAPRO  This may have changed:  when to take this  Used for:  Brief psychotic disorder (H), Episodic mood disorder (H)      Dose:  10 mg  Take 1 tablet (10 mg) by mouth daily  Quantity:  30 tablet  Refills:  1        CONTINUE these medicines which have NOT CHANGED      Dose / Directions   traZODone 50 MG tablet  Commonly known as:  DESYREL  Used for:  Brief psychotic disorder (H), Episodic mood disorder (H)      Dose:  50 mg  Take 1 tablet (50 mg) by mouth At Bedtime  Quantity:  30 tablet  Refills:  1           Where to get your medicines      These medications were sent to [id number] - [place], [place] - [address]  [address] [facility] [address]    Phone:  [phone]     escitalopram 10 MG tablet    hydrOXYzine 25 MG tablet    QUEtiapine 25 MG tablet    traZODone 50 MG tablet              Mental Status Examination:   The patient is alert and oriented to person, place, and date.  She is casually dressed with appropriate grooming.  She has good eye contact.  She is  cooperative to interview.  Speech is normal in rate and volume.  Language is intact for word usage and sentence structure.  Mood was mildly anxious, otherwise euthymic.  Affect is congruent to mood.  She has no psychomotor agitation or retardation.  Muscle strength and tone and gait and station are all within normal limits.  Thought process is linear and goal oriented.  Associations are intact.  Thought content currently negative for suicidal or homicidal thoughts.  No signs of psychosis.  The patient denies hallucinations.  Recent and remote memory are somewhat impaired surrounding the last few days, otherwise intact.  Fund of knowledge is adequate.  Attention and concentration intact.  Insight intact, judgment intact.               Discharge Plan:   No discharge procedures on file.    - Patient was discharged back home with medications.     Gonzalo Garg MD  [facility]

## 2019-03-23 ENCOUNTER — HOSPITAL ENCOUNTER (EMERGENCY)
Facility: CLINIC | Age: 34
Discharge: NURSING, REHAB OR LONG-TERM CARE | End: 2019-03-24
Attending: EMERGENCY MEDICINE | Admitting: EMERGENCY MEDICINE
Payer: COMMERCIAL

## 2019-03-23 DIAGNOSIS — R41.89 DISORGANIZED THOUGHT PROCESS: ICD-10-CM

## 2019-03-23 DIAGNOSIS — F29 PSYCHOSIS, UNSPECIFIED PSYCHOSIS TYPE (H): ICD-10-CM

## 2019-03-23 DIAGNOSIS — F22 PARANOID BEHAVIOR (H): ICD-10-CM

## 2019-03-23 PROCEDURE — 99285 EMERGENCY DEPT VISIT HI MDM: CPT | Mod: 25

## 2019-03-23 PROCEDURE — 25000132 ZZH RX MED GY IP 250 OP 250 PS 637: Performed by: EMERGENCY MEDICINE

## 2019-03-23 PROCEDURE — 90791 PSYCH DIAGNOSTIC EVALUATION: CPT

## 2019-03-23 RX ORDER — ESCITALOPRAM OXALATE 20 MG/1
20 TABLET ORAL DAILY
Status: ON HOLD | COMMUNITY
End: 2019-04-08

## 2019-03-23 RX ORDER — ACETAMINOPHEN 500 MG
1000 TABLET ORAL ONCE
Status: COMPLETED | OUTPATIENT
Start: 2019-03-23 | End: 2019-03-23

## 2019-03-23 RX ADMIN — ACETAMINOPHEN 1000 MG: 500 TABLET, FILM COATED ORAL at 22:59

## 2019-03-23 SDOH — HEALTH STABILITY: MENTAL HEALTH: HOW OFTEN DO YOU HAVE A DRINK CONTAINING ALCOHOL?: NEVER

## 2019-03-23 ASSESSMENT — ENCOUNTER SYMPTOMS
AGITATION: 1
FEVER: 0
HALLUCINATIONS: 0
CHILLS: 0
CONFUSION: 1
SLEEP DISTURBANCE: 1

## 2019-03-23 ASSESSMENT — MIFFLIN-ST. JEOR: SCORE: 1234.32

## 2019-03-24 ENCOUNTER — HOSPITAL ENCOUNTER (INPATIENT)
Facility: CLINIC | Age: 34
LOS: 16 days | Discharge: HOME | DRG: 885 | End: 2019-04-09
Attending: PSYCHIATRY & NEUROLOGY | Admitting: PSYCHIATRY & NEUROLOGY
Payer: COMMERCIAL

## 2019-03-24 VITALS
RESPIRATION RATE: 16 BRPM | OXYGEN SATURATION: 97 % | TEMPERATURE: 98.4 F | HEIGHT: 64 IN | BODY MASS INDEX: 20.49 KG/M2 | HEART RATE: 96 BPM | SYSTOLIC BLOOD PRESSURE: 121 MMHG | DIASTOLIC BLOOD PRESSURE: 85 MMHG | WEIGHT: 120 LBS

## 2019-03-24 DIAGNOSIS — F25.0 SCHIZOAFFECTIVE DISORDER, BIPOLAR TYPE (H): ICD-10-CM

## 2019-03-24 DIAGNOSIS — F99 MENTAL HEALTH DISORDER: Primary | ICD-10-CM

## 2019-03-24 PROBLEM — Z72.820 SLEEP DEPRIVATION: Status: ACTIVE | Noted: 2019-03-24

## 2019-03-24 LAB — HCG SERPL QL: NEGATIVE

## 2019-03-24 PROCEDURE — 25000132 ZZH RX MED GY IP 250 OP 250 PS 637: Performed by: EMERGENCY MEDICINE

## 2019-03-24 PROCEDURE — 12400001 ZZH R&B MH UMMC

## 2019-03-24 PROCEDURE — 84703 CHORIONIC GONADOTROPIN ASSAY: CPT | Performed by: PSYCHIATRY & NEUROLOGY

## 2019-03-24 PROCEDURE — 36415 COLL VENOUS BLD VENIPUNCTURE: CPT | Performed by: PSYCHIATRY & NEUROLOGY

## 2019-03-24 PROCEDURE — 99207 ZZC DOWN CODE DUE TO SUBSEQUENT EXAM: CPT | Performed by: PSYCHIATRY & NEUROLOGY

## 2019-03-24 PROCEDURE — 25000132 ZZH RX MED GY IP 250 OP 250 PS 637: Performed by: PSYCHIATRY & NEUROLOGY

## 2019-03-24 PROCEDURE — 99221 1ST HOSP IP/OBS SF/LOW 40: CPT | Mod: AI | Performed by: PSYCHIATRY & NEUROLOGY

## 2019-03-24 RX ORDER — OLANZAPINE 10 MG/2ML
5 INJECTION, POWDER, FOR SOLUTION INTRAMUSCULAR
Status: DISCONTINUED | OUTPATIENT
Start: 2019-03-24 | End: 2019-03-24

## 2019-03-24 RX ORDER — ESCITALOPRAM OXALATE 20 MG/1
20 TABLET ORAL DAILY
Status: DISCONTINUED | OUTPATIENT
Start: 2019-03-24 | End: 2019-03-25

## 2019-03-24 RX ORDER — OLANZAPINE 5 MG/1
5 TABLET ORAL
Status: DISCONTINUED | OUTPATIENT
Start: 2019-03-24 | End: 2019-03-27

## 2019-03-24 RX ORDER — ALUMINA, MAGNESIA, AND SIMETHICONE 2400; 2400; 240 MG/30ML; MG/30ML; MG/30ML
30 SUSPENSION ORAL EVERY 4 HOURS PRN
Status: DISCONTINUED | OUTPATIENT
Start: 2019-03-24 | End: 2019-04-09 | Stop reason: HOSPADM

## 2019-03-24 RX ORDER — HYDROXYZINE HYDROCHLORIDE 25 MG/1
25-50 TABLET, FILM COATED ORAL EVERY 4 HOURS PRN
Status: DISCONTINUED | OUTPATIENT
Start: 2019-03-24 | End: 2019-03-27

## 2019-03-24 RX ORDER — OLANZAPINE 5 MG/1
5 TABLET ORAL
Status: DISCONTINUED | OUTPATIENT
Start: 2019-03-24 | End: 2019-03-24

## 2019-03-24 RX ORDER — QUETIAPINE FUMARATE 25 MG/1
25 TABLET, FILM COATED ORAL AT BEDTIME
Status: DISCONTINUED | OUTPATIENT
Start: 2019-03-24 | End: 2019-03-25

## 2019-03-24 RX ORDER — ACETAMINOPHEN 325 MG/1
650 TABLET ORAL EVERY 4 HOURS PRN
Status: DISCONTINUED | OUTPATIENT
Start: 2019-03-24 | End: 2019-04-09 | Stop reason: HOSPADM

## 2019-03-24 RX ORDER — ESCITALOPRAM OXALATE 10 MG/1
10 TABLET ORAL DAILY
Status: DISCONTINUED | OUTPATIENT
Start: 2019-03-24 | End: 2019-03-24 | Stop reason: HOSPADM

## 2019-03-24 RX ORDER — TRAZODONE HYDROCHLORIDE 50 MG/1
50 TABLET, FILM COATED ORAL
Status: DISCONTINUED | OUTPATIENT
Start: 2019-03-24 | End: 2019-03-27

## 2019-03-24 RX ORDER — HYDROXYZINE HYDROCHLORIDE 25 MG/1
25 TABLET, FILM COATED ORAL EVERY 4 HOURS PRN
Status: DISCONTINUED | OUTPATIENT
Start: 2019-03-24 | End: 2019-03-24 | Stop reason: HOSPADM

## 2019-03-24 RX ORDER — TRAZODONE HYDROCHLORIDE 100 MG/1
100 TABLET ORAL AT BEDTIME
Status: DISCONTINUED | OUTPATIENT
Start: 2019-03-24 | End: 2019-03-24 | Stop reason: HOSPADM

## 2019-03-24 RX ORDER — OLANZAPINE 10 MG/2ML
5 INJECTION, POWDER, FOR SOLUTION INTRAMUSCULAR
Status: DISCONTINUED | OUTPATIENT
Start: 2019-03-24 | End: 2019-03-27

## 2019-03-24 RX ADMIN — OLANZAPINE 5 MG: 5 TABLET, FILM COATED ORAL at 20:36

## 2019-03-24 RX ADMIN — OLANZAPINE 5 MG: 5 TABLET, FILM COATED ORAL at 16:12

## 2019-03-24 RX ADMIN — QUETIAPINE 12.5 MG: 25 TABLET, FILM COATED ORAL at 01:14

## 2019-03-24 RX ADMIN — ESCITALOPRAM OXALATE 10 MG: 10 TABLET ORAL at 07:54

## 2019-03-24 RX ADMIN — TRAZODONE HYDROCHLORIDE 50 MG: 50 TABLET ORAL at 20:36

## 2019-03-24 RX ADMIN — TRAZODONE HYDROCHLORIDE 100 MG: 100 TABLET ORAL at 01:14

## 2019-03-24 RX ADMIN — QUETIAPINE FUMARATE 25 MG: 25 TABLET ORAL at 20:36

## 2019-03-24 ASSESSMENT — ACTIVITIES OF DAILY LIVING (ADL)
RETIRED_EATING: 0-->INDEPENDENT
LAUNDRY: UNABLE TO COMPLETE
BATHING: 0-->INDEPENDENT
DRESS: 0-->INDEPENDENT
AMBULATION: 0-->INDEPENDENT
ORAL_HYGIENE: INDEPENDENT
HYGIENE/GROOMING: INDEPENDENT
RETIRED_COMMUNICATION: 0-->UNDERSTANDS/COMMUNICATES WITHOUT DIFFICULTY
TOILETING: 0-->INDEPENDENT
SWALLOWING: 0-->SWALLOWS FOODS/LIQUIDS WITHOUT DIFFICULTY
DRESS: INDEPENDENT
COGNITION: 2 - DIFFICULTY WITH ORGANIZING THOUGHTS
FALL_HISTORY_WITHIN_LAST_SIX_MONTHS: NO
TRANSFERRING: 0-->INDEPENDENT
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
## 2019-03-24 NOTE — ED NOTES
Video Observation initiated, patient informed. Privacy maintained by turning camera off while pt changed into scrubs.    Deloris Collier RN

## 2019-03-24 NOTE — ED NOTES
Plan to transfer to [place] [address] with doctor Hills. Report to be called at midnight to [phone]. Transfer paperwork completed.

## 2019-03-24 NOTE — ED NOTES
Patient continues to have paranoia and forgetfulness. Didn't remember coming to hospital last night and what was going on. Patient and  updated on situation.

## 2019-03-24 NOTE — PROGRESS NOTES
"Patient arrived  3/24/2019 11:38 AM to [address]. Patient expression is Tearful. Patient appears flat and is cooperative and guarded. Patient is disorganized and offers few responses. Patient is able to follow direction and states \"I just wish I were dead\" -patient denies SI plan/intent rogelio for safety on unit.  Patient later offers she is from [place], [place] (family there) and has struggled with move here. Patient has a  and cat at home.     EPIC listed admitting  DX: mental health    Vital signs reviewed related to admission.  /81   Pulse 103   Temp 97.9  F (36.6  C) . Patient denies  pain.     Patient rates depression 10/10* c/o Fatigue or loss of energy  Feelings of worthlessness or excessive/inappropriate guilt  Diminished concentration or indecisiveness  Patient rates anxiety at 10/10* c/o Uncontrolled worrying           Patient denies current or recent suicidal ideation  stating \"I do wish I were dead.\"  SIB denies     HI: denies current or recent homicidal ideation or behaviors.    Patient stated REASON for Admission : \"just hopeless.\"                 Patient stated GOALS for Discharge: \"I don't know.\"     PRECAUTIONS at admit:Suicide, S15  ----ADDED: ELOPEMENT, SEXUAL       15 min checks initiated. Brief orientation to unit provided.     Nursing will continue to monitor.    *Scale is 1-10 (10 is the worst)    NURSING TO DO LIST    -CARE PLAN: ENTER ADULT BEHAVIORAL HEALTH PLAN OF CARE  -CARE PLAN: Enter current concern based on priority (BEH template).   -FLOW SHEETS: EDU, Adult PCS, Safety, VS (enter PAIN-CAPA).  -NURSING NAVIGATOR (ADMIT TAB) for required documentation.  -Orders by ON-CALL                "

## 2019-03-24 NOTE — ED NOTES
"[facility] called and informed writer that they are unable to take patient until qamar morning due to staffing issues.  Addendum: [facility] called again and stated, \"Patient can't come until day shift, possibly evening shift.\"  "

## 2019-03-24 NOTE — ED NOTES
Signed out by Dr. Max at shift change.  She presented with increasing paranoia and difficulty sleeping despite increases in home medications.  Accepted at [facility], anticipated transfer tonight.    1240am - Informed by nursing that [facility] could not accept patient at night due to staffing.  Will be here until the morning.  Will try increasing trazodone dose + seroquel to see if it will help her sleep.    410am - Checked on patient; sleeping.    550am - Still sleeping.  Home medications ordered.  Signed out to Dr. Benito.     Leni Farooq MD  03/24/19 5402

## 2019-03-24 NOTE — ED NOTES
Pt awake and asking where her sister is.  reassured pt that she is at home. Pt completing breakfast menu.
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
## 2019-03-24 NOTE — PHARMACY-ADMISSION MEDICATION HISTORY
Admission medication history interview status for the 3/23/2019  admission is complete. See EPIC admission navigator for prior to admission medications     Medication history source reliability:Moderate    Actions taken by pharmacist (provider contacted, etc):None     Additional medication history information not noted on PTA med list : PTA med list updated per spouse and patient. Spouse mentioned she also takes vitamin D and B12 but not consistently.     Medication reconciliation/reorder completed by provider prior to medication history? No    Time spent in this activity: 15 minutes    Prior to Admission medications    Medication Sig Last Dose Taking? Auth Provider   escitalopram (LEXAPRO) 20 MG tablet Take 20 mg by mouth daily 3/23/2019 at AM Yes Unknown, Entered By History   hydrOXYzine (ATARAX) 25 MG tablet Take 1 tablet (25 mg) by mouth every 4 hours as needed for anxiety 3/23/2019 at AM Yes Gonzalo Garg MD   QUEtiapine (SEROQUEL) 25 MG tablet Take 0.5 tablets (12.5 mg) by mouth 3 times daily as needed (Take for hightened anxiety episodes) 3/23/2019 at AM Yes Gonzalo Garg MD   traZODone (DESYREL) 50 MG tablet Take 1 tablet (50 mg) by mouth At Bedtime 3/22/2019 at PM Yes Gonzalo Garg MD

## 2019-03-24 NOTE — PROGRESS NOTES
"Patient behavior is consistent with s/s associated with manuela.    Patient is intrusive and has joined many patients and their visitors. Patient complaining. Visitors complaining.     Patient walked to her room told writer \"get the fuck out.\"    Patient refused PRN seroquel.    Patient continues to intrude on visitors. Staff attempting to redirect.    Intake called-no private rooms in building.  ON-CALL paged.  ---SIO  ---SINGLE ROOM  ---PRN added      "

## 2019-03-24 NOTE — ED PROVIDER NOTES
Patient signed out to me this morning.  Was awaiting a bed at [facility].  They are ready for her now.  She will be going on a 72-hour hold because of her paranoia and mild psychosis.     Nathalia Benito MD  03/24/19 0946

## 2019-03-24 NOTE — PHARMACY-ADMISSION MEDICATION HISTORY
Admission medication history for the March 24, 2019 admission has been completed by pharmacy.     Completed at [facility] prior to transfer. Please see pharmacy note dated 3/23 for further information.     Prior to Admission Medication List:  Prior to Admission medications    Medication Sig Last Dose  Auth Provider   escitalopram (LEXAPRO) 20 MG tablet Take 20 mg by mouth daily   Unknown, Entered By History   QUEtiapine (SEROQUEL) 25 MG tablet Take 0.5 tablets (12.5 mg) by mouth 3 times daily as needed (Take for hightened anxiety episodes)   Gonzalo Garg MD   traZODone (DESYREL) 50 MG tablet Take 1 tablet (50 mg) by mouth At Bedtime   Gonzalo Garg MD     Medication history completed by:   Phoebe Peace, PharmD, [facility]  [facility]  Available daily from 1-9 PM: phone [phone], ascom *[phone], pager [phone]

## 2019-03-24 NOTE — ED NOTES
Attempted to get bed availability from [facility]. States they didn't know at this time and will call me back.

## 2019-03-24 NOTE — ED PROVIDER NOTES
History     Chief Complaint:  Psychiatric Evaluation       HPI   Fiorella Bartlett is a 33 year old female with a history of mental health disorder since 2015 on trazodone, Hydroxyzine, Lexapro, and Seroquel who presents with her  and sister for psychiatric evaluation.Per the husbands report, the patient was hospitalized about a month and a half ago with paranoia and difficulty sleeping. Following the hospitalization, the patient's psychiatrist increased her Hydroxyzine from 10 mg to 20 mg and the patient subsequently took a leave of abscess from work for the next month. During this time the  reports that the patient was doing very well and seemed like herself. She went back to work a little over a week ago and over the course of the week the patient began to struggle with sleep more every day, was more irritable with mood fluctuations. Patient has also been having difficulty processing, more confused and withdraw. The  also notes that her other medications are taken as needed so two days ago the patient took Seroquel which seemed to help. Patient took Seroquel again yesterday and today but it did not seem to be as effective as it was on Thursday. Patient took Hydroxyzine today at 1 PM and again at 5 PM. This calmed her for an hour. Since Thursday, the patient has seemed disorganized to the  and over the last 24 hours she has appeared paranoid, patient has been worried about a friend who visited her yesterday, she is shutting and opening the blinds. The patient's sister confirms the husbands concerns and story.   The patient denies visual or auditory hallucinations. No homicidal or suicidal ideations. No fevers or chills.       Allergies:  Penicillins     Medications:    Lexapro  Desyrel  Seroquel     Past Medical History:    Depressive disorder    Past Surgical History:    History reviewed. No pertinent surgical history.    Family History:    History reviewed. No pertinent family  "history.     Social History:  Smoking status: Never smoker  Alcohol use: No  Marital Status:   [2]       Review of Systems   Constitutional: Negative for chills and fever.   Psychiatric/Behavioral: Positive for agitation, confusion and sleep disturbance. Negative for hallucinations and suicidal ideas.   All other systems reviewed and are negative.        Physical Exam     Patient Vitals for the past 24 hrs:   BP Temp Temp src Pulse Resp SpO2 Height Weight   03/23/19 1902 147/83 98.1  F (36.7  C) Temporal 95 16 98 % 1.626 m (5' 4\") 54.4 kg (120 lb)         Physical Exam  GENERAL: quiet, withdrawn, sitting on bed with legs crossed, slow to answer, little story provided by patient  HEAD: atraumatic  EYES: pupils reactive, extraocular muscles intact, conjunctivae normal  ENT:  mucus membranes moist  NECK:  trachea midline, normal range of motion  RESPIRATORY: no tachypnea, breath sounds clear to auscultation   CVS: normal S1/S2, no murmurs, intact distal pulses  ABDOMEN: soft, nontender, nondistention  MUSCULOSKELETAL: no deformities  SKIN: warm and dry, no acute rashes or ulceration  NEURO: GCS 15, cranial nerves intact, alert and oriented x3  PSYCH:  Soft spoken, withdrawn, takes a long time to answer at times.     Emergency Department Course     Emergency Department Course:  Past medical records, nursing notes, and vitals reviewed.  1932: I performed an exam of the patient and obtained history, as documented above.    2032: I spoke on the phone with Nathalia DEC    2041: I rechecked the patient and she is agreeable to admission. Awaiting admission.       Impression & Plan      Medical Decision Making:    Patient presents with disorganized thoughts and difficulty with functioning.  Patient is fairly quiet throughout the interview.   notes that she is becoming more paranoid, disorganized and difficulty with doing daily tasks.  She has had this presentation in the past.  She did take a month off after last " admission but now has been back to work with increased stress of work and resulting in similar lack of sleep, paranoia and disorganized thoughts.  Patient was seen by DEC as well as myself.  Patient's agreeable to admission.  Currently awaiting admission.      Diagnosis:    ICD-10-CM    1. Disorganized thought process R41.89    2. Paranoid behavior (H) F22    3. Psychosis, unspecified psychosis type (H) F29        Disposition:  Transfer to [place] [address] Dr. Reinier Avalos  3/23/2019    EMERGENCY DEPARTMENT    Scribe Disclosure:  IDel, am serving as a scribe at 7:25 PM on 3/23/2019 to document services personally performed by Rusty Max MD based on my observations and the provider's statements to me.          Rusty Max MD  03/23/19 1449

## 2019-03-25 PROCEDURE — 25000132 ZZH RX MED GY IP 250 OP 250 PS 637: Performed by: PSYCHIATRY & NEUROLOGY

## 2019-03-25 PROCEDURE — 12400001 ZZH R&B MH UMMC

## 2019-03-25 PROCEDURE — 99233 SBSQ HOSP IP/OBS HIGH 50: CPT | Performed by: PSYCHIATRY & NEUROLOGY

## 2019-03-25 RX ORDER — QUETIAPINE FUMARATE 100 MG/1
100 TABLET, FILM COATED ORAL AT BEDTIME
Status: DISCONTINUED | OUTPATIENT
Start: 2019-03-25 | End: 2019-03-27

## 2019-03-25 RX ADMIN — HYDROXYZINE HYDROCHLORIDE 50 MG: 25 TABLET ORAL at 17:25

## 2019-03-25 RX ADMIN — Medication 12.5 MG: at 17:26

## 2019-03-25 RX ADMIN — TRAZODONE HYDROCHLORIDE 50 MG: 50 TABLET ORAL at 20:56

## 2019-03-25 RX ADMIN — HYDROXYZINE HYDROCHLORIDE 50 MG: 25 TABLET ORAL at 10:43

## 2019-03-25 RX ADMIN — Medication 12.5 MG: at 01:02

## 2019-03-25 RX ADMIN — Medication 12.5 MG: at 08:32

## 2019-03-25 RX ADMIN — ESCITALOPRAM OXALATE 20 MG: 20 TABLET ORAL at 08:14

## 2019-03-25 RX ADMIN — QUETIAPINE FUMARATE 100 MG: 100 TABLET ORAL at 20:56

## 2019-03-25 RX ADMIN — OLANZAPINE 5 MG: 5 TABLET, FILM COATED ORAL at 17:25
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
## 2019-03-25 NOTE — PROGRESS NOTES
Writer updated on- call Dr. Catalan re: need to discontinue SIO for this night shift  only and to resume SIO at 0700 as pt has been in room and not intrusive. Order received. Will reorder SIO x 24 hours at 0700.
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
## 2019-03-25 NOTE — PROGRESS NOTES
"  RN 48 hour assessment:  Patient was quite hyperactive this shift. She continues to appear to have paranoid thoughts. This writer tried to ask patient how she was doing, if she was hearing voices or seeing things other people don't. Patient said, \"Are you hearing voices.\" She would not respond further than that. Patient received prn Seroquel and Hydroxyzine this shift. Patient continued to have poor boundaries. She was seen lying on the ground in the dining area and the hallway. She then was laying on the floor in her room.  "

## 2019-03-25 NOTE — PROGRESS NOTES
Pt slept fairly well after PRN Seroquel. Prior to medication administration, pt was restless, sitting up in bed, but redirectable to laying down when staff suggested. Pt stayed in room entire shift even after SIO discontinued. Slept a total of 5.5 hours and still sleeping at time of this entry. SIO orders will resume at 0700 this morning.

## 2019-03-25 NOTE — PROGRESS NOTES
03/24/19 2022   Patient Belongings   Patient Belongings locker   Belongings Search Yes   Clothing Search Yes   Second Staff Edith BONILLA RN       In pt locker:  1 long sleeve shirt  1 t shirt  Black pants  Black socks  Shoes  2 magazines    *No cash, cards or cell phone upon admission  *Nothing sent to security    A               Admission:  I am responsible for any personal items that are not sent to the safe or pharmacy.  [facility] is not responsible for loss, theft or damage of any property in my possession.    Signature:  _________________________________ Date: _______  Time: _____                                              Staff Signature:  ____________________________ Date: ________  Time: _____      2nd Staff person, if patient is unable/unwilling to sign:    Signature: ________________________________ Date: ________  Time: _____     Discharge:  [facility] has returned all of my personal belongings:    Signature: _________________________________ Date: ________  Time: _____                                          Staff Signature:  ____________________________ Date: ________  Time: _____

## 2019-03-25 NOTE — H&P
"[facility], [place]   Psychiatric History & Physical  Admission date: 3/24/2019  Fiorella Bartlett  [id number]  [birth year]    Time: 54 minutes on encounter, >50% of which was spent in counseling and/or coordination of care consisting of: communication and education with the patient/family, lab/image/study evaluation, support staff communication, and other sources pertinent to excellent patient care.            Chief Complaint:   \"I am getting people out\"        HPI:   Fiorella Bartlett with a past medical history of major depressive disorder with psychosis, generalized anxiety disorder, eating disorder, vitamin D deficiency was admitted 3/24/2019 for possible psychosis.    According to documentation went back to work after a leave of absence during her leave of absence according to her  she was functioning well.  She started not sleeping after starting back working at the [employer].  She is now confused and having difficulty functioning.  She is hypersexual disorganized and is confused.  Had an increase in escitalopram from 10 mg to 20 mg a few weeks before hospitalization, hydroxyzine as an outpatient medication as well as quetiapine 12.5 mg as needed for sleep and anxiety.  Trazodone 50 mg was given to her for sleep.    Upon trying to visit with her she says that she needs to get everyone together including multiple people's names including Karlos, Juno, Daniel.  Says that she is getting everyone out of this please and she is preventing people from firing other people.  She has been here a long time but does not know how long.  Seem to have some memory deficits and some cognitive dysfunction.  Appears to be paranoid and is not answering certain questions during interview.  Denies any thoughts of harming herself or others or any hopelessness or helplessness and denies any hallucinations.  Makes random comments about multiple things including previous names and Sofie Kunz.  Says " that it is likely going to hell here in the hospital and that she has lost track of her body and is not sure what is going on with it.  She is unable to participate in a screening for other mental health conditions.    Physically she is not able to describe any recent illness or problems.        Past Psychiatric History:     Has had at least 2 medications for depression with psychosis symptoms and trouble sleeping leading to psychosis.  1 previous suicide attempt by overdose out of them recent currently goes to Dr. Mahoney at [facility] previous medications include ziprasidone, trazodone, escitalopram, quetiapine, risperidone.  She denies any previous mental health commitments of violence or nursing home time.          Substance Use and History:     She says she does not know which she uses substances there was no mention in previous records of her using substances.          Past Medical History:   PAST MEDICAL HISTORY:   Past Medical History:   Diagnosis Date     Depressive disorder        PAST SURGICAL HISTORY: No past surgical history on file.          Family History:   FAMILY HISTORY: No family history on file.        Social History:   SOCIAL HISTORY:   Social History     Socioeconomic History     Marital status:      Spouse name: Not on file     Number of children: Not on file     Years of education: Not on file     Highest education level: Not on file   Occupational History     Not on file   Social Needs     Financial resource strain: Not on file     Food insecurity:     Worry: Not on file     Inability: Not on file     Transportation needs:     Medical: Not on file     Non-medical: Not on file   Tobacco Use     Smoking status: Unknown If Ever Smoked     Smokeless tobacco: Never Used   Substance and Sexual Activity     Alcohol use: No     Frequency: Never     Drug use: No     Sexual activity: Not on file   Lifestyle     Physical activity:     Days per week: Not on file     Minutes per session: Not on file      Stress: Not on file   Relationships     Social connections:     Talks on phone: Not on file     Gets together: Not on file     Attends Hindu service: Not on file     Active member of club or organization: Not on file     Attends meetings of clubs or organizations: Not on file     Relationship status: Not on file     Intimate partner violence:     Fear of current or ex partner: Not on file     Emotionally abused: Not on file     Physically abused: Not on file     Forced sexual activity: Not on file   Other Topics Concern     Not on file   Social History Narrative    The patient was raised in [place].    She is one of 2 siblings, and was raised by both parents.    Trauma history: none.    The patient is  and has no children.    The patient's social support system includes significant other.    She lives with her significant other.     She completed a college degree and did not participate in special education classes.     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    She has not had involvement with the legal system.    She has not served in the .    She does not have access to firearms.    The patient reports the following spiritual and/or cultural history: None.    The patient reports the following hobbies, interests, and leisure activities: Running, reading, cooking, making soap.                        Physical ROS:   The patient endorsed the above issues. The remainder of 10-point review of systems was negative except as noted in HPI.         PTA Medications:     Medications Prior to Admission   Medication Sig Dispense Refill Last Dose     escitalopram (LEXAPRO) 20 MG tablet Take 20 mg by mouth daily   3/23/2019 at AM     [] hydrOXYzine (ATARAX) 25 MG tablet Take 1 tablet (25 mg) by mouth every 4 hours as needed for anxiety 60 tablet 1 3/23/2019 at AM     QUEtiapine (SEROQUEL) 25 MG tablet Take 0.5 tablets  (12.5 mg) by mouth 3 times daily as needed (Take for hightened anxiety episodes) 15 tablet 1 3/23/2019 at AM     traZODone (DESYREL) 50 MG tablet Take 1 tablet (50 mg) by mouth At Bedtime 30 tablet 1 3/22/2019 at PM          Allergies:     Allergies   Allergen Reactions     Penicillins Unknown     Has been told she was allergic since she was a child.           Labs:     Recent Results (from the past 48 hour(s))   HCG qualitative Blood    Collection Time: 03/24/19 12:37 PM   Result Value Ref Range    HCG Qualitative Serum Negative NEG^Negative          Physical and Psychiatric Examination:     /87   Pulse 110   Temp 97.2  F (36.2  C)   Resp 16   Wt 61.4 kg (135 lb 6.4 oz)   BMI 23.24 kg/m    Weight is 135 lbs 6.4 oz  Body mass index is 23.24 kg/m .                              Standing Orthostatic BP: 113/78            Last 4 weights:  Wt Readings from Last 4 Encounters:   03/24/19 61.4 kg (135 lb 6.4 oz)   03/23/19 54.4 kg (120 lb)   02/05/19 64.4 kg (142 lb)       Cetabolic risk assessment. 03/24/19      Reviewed patient profile for cardiometabolic risk factors    Date taken /Value  REFERENCE RANGE   Abdominal Obesity  (Waist Circumference)   See nursing flowsheet Women ?35 in (88 cm)   Men ?40 in (102 cm)      Triglycerides  Triglycerides   Date Value Ref Range Status   02/04/2019 72 <150 mg/dL Final       ?150 mg/dL (1.7 mmol/L) or current treatment for elevated triglycerides   HDL cholesterol  HDL Cholesterol   Date Value Ref Range Status   02/04/2019 60 >49 mg/dL Final   ]   Women <50 mg/dL (1.3 mmol/L) in women or current treatment for low HDL cholesterol  Men <40 mg/dL (1 mmol/L) in men or current treatment for low HDL cholesterol     Fasting plasma glucose (FPG) Lab Results   Component Value Date    GLC 95 02/04/2019      FPG ?100 mg/dL (5.6 mmol/L) or treatment for elevated blood glucose   Blood pressure  BP Readings from Last 3 Encounters:   03/24/19 125/87   03/24/19 121/85   02/05/19 129/86         Blood pressure ?130/85 mmHg or treatment for elevated blood pressure   Family History  See family history           Physical Exam:  I have reviewed the physical exam as documented by Mansoor on 3/23 and agree with findings and assessment and have no additional findings to add at this time.    Mental Status Exam:  Fiorella is a 33-year-old female that appears older than stated age and is then with blond hair.  Her speech is of an appropriate rate and tone and her language is intact.  Her behavior is restless.  Her mood she describes as I do not know.  Her affect is labile.  Her thought content consists of the above without thoughts of harming herself or others and potential delusions.  Her thought process is notable for looseness of associations and disorganization.  She may have abnormal perceptions.  She is alert and aware of her current location but not completely of circumstances.  Her attention concentration is limited to impaired.  Her long-term/short-term/remote memory appears impaired.  Her insight and judgment are both impaired.         Admission Diagnoses:   Major depressive disorder with psychosis versus sleep deprivation psychosis versus bipolar 1 disorder most recent episode manic and psychotic  Generalized anxiety disorder  History of eating disorder         Assessment & Plan:     Assessment:  Fiorella is currently severely decompensated and highly confused.  She says things that are not accurate according to her history and what we know about her.  She mentions that she is homeless and that she is not living in the hospital with completely untrue.  At this point time we will restart her medications and give her some quetiapine at night that may hopefully help his history dictates that when she is stressed she stops taking her medications does not sleep and leads to psychotic symptoms and hospitalization.  She will generally improve over several days of sleeping with medication assistance.  At this point  in time we will continue with previous plan of assisting her with sleep and hopefully stabilizing her.  We will schedule a 25 mg dose of quetiapine for tonight.    Plan:  Continue 72-hour hold may have to file for commitment enforced medication  Starting quetiapine 25 mg at night             Hugo Harris  [facility]      The following document has been created with voice recognition software and may contain unintentional word substitutions.        Non clinically relevant CMS requirements:  Clinical Global Impressions  First:     Most recent:
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
## 2019-03-25 NOTE — PROGRESS NOTES
VM from pt's , Daniel. Requesting call back, [phone].    [facility] called back. Left message.     Call back from Daniel. He states that pt had asked to go to the hospital and was voluntary so is confused that pt was put on a hold. [facility] reviewed notes and noted that the initial ED provider note indicated pt was voluntary. Pt was apparently signed out to another provider who placed pt on a hold for the transport due to pt being paranoid and having mild psychosis. There is no note that pt was disputing admit.  questioned this and is concerned that this might be detrimental to pt.     He also reports pt has had negative symptoms of schizophrenia with withdrawing. Does not see other negative symptoms when CTC asked. Does observe some obsessive or distracted thinking. The manuela is a newer symptom.     Also states that pt lost her wedding and engagement ring on the unit yesterday. Pt had them in the ED until the ambulance arrived. Is worried about pt's response to this once she is more aware of it. Is not sure what happened to the rings; states pt has no idea where they are but pt reported putting the rings in a kleenex and not sure where she put it.

## 2019-03-26 PROCEDURE — G0177 OPPS/PHP; TRAIN & EDUC SERV: HCPCS

## 2019-03-26 PROCEDURE — 25000132 ZZH RX MED GY IP 250 OP 250 PS 637: Performed by: PSYCHIATRY & NEUROLOGY

## 2019-03-26 PROCEDURE — 12400001 ZZH R&B MH UMMC

## 2019-03-26 PROCEDURE — 99232 SBSQ HOSP IP/OBS MODERATE 35: CPT | Performed by: PSYCHIATRY & NEUROLOGY

## 2019-03-26 RX ORDER — DIPHENHYDRAMINE HCL 50 MG
50 CAPSULE ORAL EVERY 4 HOURS PRN
Status: DISCONTINUED | OUTPATIENT
Start: 2019-03-26 | End: 2019-03-27

## 2019-03-26 RX ORDER — LURASIDONE HYDROCHLORIDE 20 MG/1
20 TABLET, FILM COATED ORAL
Status: DISCONTINUED | OUTPATIENT
Start: 2019-03-26 | End: 2019-03-27

## 2019-03-26 RX ADMIN — HYDROXYZINE HYDROCHLORIDE 50 MG: 25 TABLET ORAL at 07:03

## 2019-03-26 RX ADMIN — DIPHENHYDRAMINE HYDROCHLORIDE 50 MG: 50 CAPSULE ORAL at 22:43

## 2019-03-26 RX ADMIN — LURASIDONE HYDROCHLORIDE 20 MG: 20 TABLET, FILM COATED ORAL at 12:56

## 2019-03-26 RX ADMIN — OLANZAPINE 5 MG: 5 TABLET, FILM COATED ORAL at 21:22

## 2019-03-26 RX ADMIN — QUETIAPINE FUMARATE 100 MG: 100 TABLET ORAL at 20:27

## 2019-03-26 RX ADMIN — HYDROXYZINE HYDROCHLORIDE 50 MG: 25 TABLET ORAL at 12:56

## 2019-03-26 RX ADMIN — HYDROXYZINE HYDROCHLORIDE 50 MG: 25 TABLET ORAL at 18:21

## 2019-03-26 RX ADMIN — OLANZAPINE 5 MG: 5 TABLET, FILM COATED ORAL at 18:21

## 2019-03-26 ASSESSMENT — ACTIVITIES OF DAILY LIVING (ADL)
HYGIENE/GROOMING: PROMPTS;WITH ASSISTANCE
LAUNDRY: WITH SUPERVISION
ORAL_HYGIENE: PROMPTS
HYGIENE/GROOMING: PROMPTS
LAUNDRY: UNABLE TO COMPLETE
DRESS: PROMPTS
DRESS: INDEPENDENT
ORAL_HYGIENE: PROMPTS;WITH ASSISTANCE

## 2019-03-26 NOTE — PLAN OF CARE
INITIAL OT NOTE  OT Goal 1  Pt will demonstrate consistent engagement in OT group activities that support recovery as evidenced by participating in >1 scheduled OT group/day for 5 days.      Pt attended 1 out of 3 OT groups offered. Pt wandered in and out of the morning OT groups (no charge), and appeared confused, disorganized, and restless. In the afternoon, pt actively participated in occupational therapy clinic. Pt was able to ask for assistance with prompting, and initiated a structured creative expression task with assistance in task set-up. She initially appeared restless, anxious, and distractible, though was receptive when a peer gently encouraged her to stay in group. Attentive to task for about x20 minutes, though continued to appear anxious and confused. Offered minimal, soft-spoken responses when conversation was initiated with her. Will continue to assess. Pt will be given self-assessment form, and OT staff will explain the purpose of including them in their treatment plan and offer options for meeting their needs. Initial assessment to be completed upon additional group participation.

## 2019-03-26 NOTE — PROGRESS NOTES
[facility], [place]   Psychiatric Progress Note  Hospital Day: 2        Interim History:   The patient's care was discussed with the treatment team during the daily team meeting and/or staff's chart notes were reviewed.  Staff report patient continues to be emotionally labile and confused. She requires regular reassurance and has a lot of questions. She has not tried to grab any other patients and has not demonstrated any safety concerns to herself or others.    Upon interview, the patient is tearful and confused. She doesn't really understand why she is here. She asks about her illness and wonders why I believe she has bipolar disorder. In the end, she agreed to a trial of lurasidone. When working on timing for medication I had asked what her most reliable meal of the day is since it requires food for good absorption. This question was overwhelming for her and she became tearful.    Psychiatric Symptoms: disorganization, insomnia, manic symptoms, paranoia, confusion    Medication side effects reported: none    Other issues reported by patient: none         Medications:       lurasidone  20 mg Oral Daily with lunch     QUEtiapine  100 mg Oral At Bedtime          Allergies:     Allergies   Allergen Reactions     Penicillins Unknown     Has been told she was allergic since she was a child.           Labs:     No results found for this or any previous visit (from the past 48 hour(s)).       Psychiatric Examination:     /87   Pulse 110   Temp 97.7  F (36.5  C) (Tympanic)   Resp 16   Wt 61.4 kg (135 lb 6.4 oz)   BMI 23.24 kg/m    Weight is 135 lbs 6.4 oz  Body mass index is 23.24 kg/m .    Orthostatic Vitals       Most Recent      Sitting Orthostatic /70 03/25 1600    Sitting Orthostatic Pulse (bpm) 93 03/25 1600    Standing Orthostatic /88 03/25 0725    Standing Orthostatic Pulse (bpm) 88 03/25 0725            Appearance: awake, alert, dressed in hospital scrubs and  unkempt  Attitude:  cooperative  Eye Contact:  fair  Mood:  labile  Affect:  tearful and labile  Speech:  clear, coherent  Language: fluent and intact in English  Psychomotor, Gait, Musculoskeletal:  no evidence of tardive dyskinesia, dystonia, or tics and intact station, gait and muscle tone  Throught Process:  disorganized and illogical  Associations:  loosening of associations present  Thought Content:  hypersexual, paranoid  Insight:  limited  Judgement:  limited  Oriented to:  time, person, and place  Attention Span and Concentration:  intact  Recent and Remote Memory:  intact  Fund of Knowledge:  appropriate    Clinical Global Impressions  First:  Considering your total clinical experience with this particular patient population, how severe are the patient's symptoms at this time?: 7 (03/25/19 1318)  Compared to the patient's condition at the START of treatment, this patient's condition is:: 4 (03/25/19 1318)  Most recent:  Considering your total clinical experience with this particular patient population, how severe are the patient's symptoms at this time?: 7 (03/25/19 1318)  Compared to the patient's condition at the START of treatment, this patient's condition is:: 4 (03/25/19 1318)           Precautions:     Behavioral Orders   Procedures     Code 1 - Restrict to Unit     Elopement precautions     Routine Programming     As clinically indicated     Sexual precautions     15 minutes after admission patient hugging male patient.     Status 15     Every 15 minutes.          Diagnoses:   1. Bipolar disorder, currently manic  2. Generalized anxiety disorder         Assessment & Plan:       Target psychiatric symptoms and interventions:  Xiomara  -Seroquel 100 mg at bedtime.  -trial of lurasidone starting at 20 mg with lunch    Medical Problems and Treatments:  None acute    Behavioral/Psychological/Social:  Encourage unit programming    Trial patient off of 1:1. She remains disorganized but hasn't shown intrusive  behaviors toward others.    Disposition Plan   Reason for ongoing admission: is unable to care for self due to severe psychosis or manuela  Discharge location: home with family  Discharge Medications: not ordered  Follow-up Appointments: not scheduled  Legal Status: voluntary  Entered by: Shon Hills on 3/26/2019 at 4:10 PM

## 2019-03-26 NOTE — PROGRESS NOTES
Pt remains on status individual observation for severe intrusiveness, with 1:1 staff.       1. What PRN did patient receive? Hydroxyzine    2. What was the patient doing that led to the PRN medication? Pt requested medication for anxiety.    3. Did they require R/S? No.     4. Side effects to PRN medication?No

## 2019-03-26 NOTE — PROGRESS NOTES
Pt was visible in the milieu all shift pacing the jansen and going in and out of groups.  Pt presents as disorganized, tense , confused and restless on approach . Pt did not eat both breakfast and lunch , repeatedly asking for her cell phone and her bag to leave. Pt denied SI/SIB thoughts and HI, blunted and teary for most of this shift . Pt did not display any inappropriate or intrusive  behaviors this shift , no further concerns .      03/26/19 1408   Behavioral Health   Hallucinations denies / not responding to hallucinations   Thinking confused;distractable;poor concentration   Orientation person: oriented   Insight poor   Judgement impaired   Eye Contact at examiner   Affect blunted, flat;tense   Mood anxious;labile   Physical Appearance/Attire untidy   Hygiene neglected grooming - unclean body, hair, teeth   Suicidality other (see comments)  (Pt denies)   1. Wish to be Dead (None stated or observed.)   Wish to be Dead Description (none stated )   2. Non-Specific Active Suicidal Thoughts  No   Self Injury other (see comment)  (Pt denies)   Activity restless   Speech flight of ideas;rambling   Medication Sensitivity no stated side effects;no observed side effects   Psychomotor / Gait balanced;steady   Psycho Education   Type of Intervention 1:1 intervention   Response participates with cues/redirection   Hours 0.5   Treatment Detail (check in / observation )   Activities of Daily Living   Hygiene/Grooming prompts   Oral Hygiene prompts   Dress independent   Laundry with supervision   Room Organization independent

## 2019-03-26 NOTE — PROGRESS NOTES
VM from pt's , Daniel. States pt signed in voluntarily and wanted to be sure CTC was aware of this. Also states pt seemed a bit better, tracking better last night. [phone].

## 2019-03-26 NOTE — PROGRESS NOTES
"Patient continues to require SIO for confusion, poor judgment, mood lability, and increasing reluctance to comply with redirection.   She is frustrated because \"I don't know where I should go but you all keep telling me what to do.\"  She was given PRN zyprexa, trazodone, seroquel and vistaril this evening.  Noted to be weepy and exhibiting more confusion when  and friend visited.  Patient signed consent for general hospital treatment (two-page form) at strong urging of .  They were both informed that this does not mean the hold has been dropped and that provider will make that decision.  Patient had great difficulty focusing  while reading consent and was easily sidetracked  by provisions which do not apply to her, such as birth of a child during hospitalization.  Signing of psychiatric consent for treatment was deferred due to patient's level of confusion and disorganization.  Patient is aware that she does not have her wedding and engagement bands.  She remains focused on male patients and her attention is diverted when they walk past her.  She did not touch anyone inappropriately this evening but was more irritable after visiting hours, only reluctantly cooperating with mouth check and intermittently staring at staff suspiciously.  "

## 2019-03-27 PROCEDURE — 99233 SBSQ HOSP IP/OBS HIGH 50: CPT | Performed by: PSYCHIATRY & NEUROLOGY

## 2019-03-27 PROCEDURE — 12400001 ZZH R&B MH UMMC

## 2019-03-27 PROCEDURE — G0177 OPPS/PHP; TRAIN & EDUC SERV: HCPCS

## 2019-03-27 PROCEDURE — 25000132 ZZH RX MED GY IP 250 OP 250 PS 637: Performed by: PSYCHIATRY & NEUROLOGY

## 2019-03-27 RX ORDER — DIPHENHYDRAMINE HCL 50 MG
50 CAPSULE ORAL EVERY 4 HOURS PRN
Status: DISCONTINUED | OUTPATIENT
Start: 2019-03-27 | End: 2019-03-27

## 2019-03-27 RX ORDER — QUETIAPINE FUMARATE 200 MG/1
200 TABLET, FILM COATED ORAL AT BEDTIME
Status: DISCONTINUED | OUTPATIENT
Start: 2019-03-27 | End: 2019-04-09 | Stop reason: HOSPADM

## 2019-03-27 RX ORDER — LORAZEPAM 1 MG/1
2 TABLET ORAL EVERY 4 HOURS PRN
Status: DISCONTINUED | OUTPATIENT
Start: 2019-03-27 | End: 2019-03-27

## 2019-03-27 RX ORDER — HALOPERIDOL 5 MG/ML
5 INJECTION INTRAMUSCULAR EVERY 4 HOURS PRN
Status: DISCONTINUED | OUTPATIENT
Start: 2019-03-27 | End: 2019-04-09 | Stop reason: HOSPADM

## 2019-03-27 RX ORDER — LORAZEPAM 2 MG/ML
2 INJECTION INTRAMUSCULAR EVERY 4 HOURS PRN
Status: DISCONTINUED | OUTPATIENT
Start: 2019-03-27 | End: 2019-03-27

## 2019-03-27 RX ORDER — DIPHENHYDRAMINE HYDROCHLORIDE 50 MG/ML
50 INJECTION INTRAMUSCULAR; INTRAVENOUS EVERY 4 HOURS PRN
Status: DISCONTINUED | OUTPATIENT
Start: 2019-03-27 | End: 2019-04-09 | Stop reason: HOSPADM

## 2019-03-27 RX ORDER — HALOPERIDOL 5 MG/1
5 TABLET ORAL EVERY 4 HOURS PRN
Status: DISCONTINUED | OUTPATIENT
Start: 2019-03-27 | End: 2019-04-09 | Stop reason: HOSPADM

## 2019-03-27 RX ORDER — DIPHENHYDRAMINE HYDROCHLORIDE 50 MG/ML
50 INJECTION INTRAMUSCULAR; INTRAVENOUS EVERY 4 HOURS PRN
Status: DISCONTINUED | OUTPATIENT
Start: 2019-03-27 | End: 2019-03-27

## 2019-03-27 RX ORDER — LORAZEPAM 2 MG/ML
2 INJECTION INTRAMUSCULAR EVERY 4 HOURS PRN
Status: DISCONTINUED | OUTPATIENT
Start: 2019-03-27 | End: 2019-04-09 | Stop reason: HOSPADM

## 2019-03-27 RX ORDER — DIPHENHYDRAMINE HCL 50 MG
50 CAPSULE ORAL EVERY 4 HOURS PRN
Status: DISCONTINUED | OUTPATIENT
Start: 2019-03-27 | End: 2019-04-09 | Stop reason: HOSPADM

## 2019-03-27 RX ORDER — LORAZEPAM 1 MG/1
2 TABLET ORAL EVERY 4 HOURS PRN
Status: DISCONTINUED | OUTPATIENT
Start: 2019-03-27 | End: 2019-04-09 | Stop reason: HOSPADM

## 2019-03-27 RX ORDER — LURASIDONE HYDROCHLORIDE 20 MG/1
40 TABLET, FILM COATED ORAL
Status: DISCONTINUED | OUTPATIENT
Start: 2019-03-27 | End: 2019-03-29

## 2019-03-27 RX ADMIN — OLANZAPINE 5 MG: 5 TABLET, FILM COATED ORAL at 00:20

## 2019-03-27 RX ADMIN — DIPHENHYDRAMINE HYDROCHLORIDE 50 MG: 50 CAPSULE ORAL at 08:52

## 2019-03-27 RX ADMIN — LURASIDONE HYDROCHLORIDE 40 MG: 20 TABLET, FILM COATED ORAL at 13:43

## 2019-03-27 RX ADMIN — QUETIAPINE FUMARATE 200 MG: 200 TABLET ORAL at 20:27

## 2019-03-27 RX ADMIN — LORAZEPAM 2 MG: 1 TABLET ORAL at 08:52

## 2019-03-27 RX ADMIN — TRAZODONE HYDROCHLORIDE 50 MG: 50 TABLET ORAL at 00:20

## 2019-03-27 RX ADMIN — HALOPERIDOL 5 MG: 5 TABLET ORAL at 08:52

## 2019-03-27 ASSESSMENT — ACTIVITIES OF DAILY LIVING (ADL)
HYGIENE/GROOMING: PROMPTS
ORAL_HYGIENE: INDEPENDENT
HYGIENE/GROOMING: INDEPENDENT
DRESS: PROMPTS
LAUNDRY: UNABLE TO COMPLETE
LAUNDRY: UNABLE TO COMPLETE
DRESS: INDEPENDENT
ORAL_HYGIENE: PROMPTS

## 2019-03-27 NOTE — PROGRESS NOTES
She was released from 5-point restraints at 0357.  Personal clothing items removed from room, however, she reported to be cold and asked staff for additional blankets.  She contracted for safety.  We discussed her volitional falls and she was unable to say if she was dizzy, weak, etc.  Remains on SIO.

## 2019-03-27 NOTE — PLAN OF CARE
Patient attended 2 OT groups. OT clinic she initiated selection of projects and was organized in her execution with attention to detail and embellishment. She asserted asking for what she needed. Her affect was slightly restricted with odd delay in social responses at times. She responded appropriately to clean up cues and initiated some social interaction via compliments on others work. She received compliments on her own work.  She participated in mindfulness group, following yoga exercises and participating in a meditation exercise. She deviated  from some exercises, choosing to do her own movements.

## 2019-03-27 NOTE — PROGRESS NOTES
03/27/19 1441   Behavioral Health   Hallucinations denies / not responding to hallucinations   Thinking poor concentration   Orientation situation, disoriented   Memory temporary   Insight poor   Judgement impaired   Eye Contact at examiner   Affect incongruent;blunted, flat;sad   Mood depressed;hopeless;anxious   Physical Appearance/Attire untidy   Hygiene neglected grooming - unclean body, hair, teeth   Suicidality (Denies)   1. Wish to be Dead No   2. Non-Specific Active Suicidal Thoughts  No   3. Active Sucidal Ideation with any Methods (Not Plan) Without Intent to Act  No   Activity withdrawn;isolative   Speech pressured;circumstantial   Psychomotor / Gait slow;unsteady   Activities of Daily Living   Hygiene/Grooming prompts   Oral Hygiene prompts   Dress prompts   Laundry unable to complete   Room Organization unable     Patient has spent most of the shift in her room. She has been very sad looking and extremely confused. She asked staff multiple times during the day if she could leave. She packed the things from her room into the brown trash bag and held the bag while standing next to her bedroom door asking if she could leave. She was tearful anytime she spoke and seemed very concerned and confused. She did go to one group and participated but other than that she has been in her room awake most of the day. Patient has not been eating very much and needs prompting for most daily activities. She has not made any inappropriate comments or actions today as she has been mostly isolative in her room.

## 2019-03-27 NOTE — PROGRESS NOTES
Pt appeared to be upset,crying, and confused throughout the night. Pt would walk around the jansen looking for items. Pt attempted several times thoughout the night to get behind the nursing station desk by jumping over and opening the door. Pt was redirected several times. Pt would also drop her self to the ground aggressively.  Pt attempted to close the door on staff several times. Pt hid by a corner of the room and demanded staff leave her room. Staff stood custodial by the door. Staff noticed she had her sweatshirt off and was concern about self harm. Few seconds later pt had her sports bra around wrapped around her neck.

## 2019-03-27 NOTE — PROGRESS NOTES
"Patient stated that she is very worried about everyone in her family. She said she wants to go, but was unable to say where she wanted to go. She appeared emotional/tearful while talking. Patient was unable to answer questions related to psych symptoms. However, she said she does not want to be dead or to hurt self. She was able to say that's he was employed and is currently on FMLA, said she is  and has no children, just a cat. 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   Patient appeared very confused, disoriented to situation. She was requesting to be transferred to a different unit, when asked, she did not know what unit she is currently on and where she wants to be transferred. She was offered fluids and food, drank 1 cup of [place] and 2 cups of water as encouraged by staff. Patient was observed to be using the bathroom and having urine output.   Patient's  and female friend visited patient is her room. Husbands reported that patient has been talking about seeing her coworkers in her hospital room and making paranoid statements. Per , patient was having visual and auditory hallucinations, and was being paranoid.     Patient was observed to be \"lowering self on the floor\" and crawling on the floor in her room and out in the hallway. One time staff perceived her behavior as \"throwing self on the floor\". Patient was observed in multiple occasions to be walking steadily in her room and in the hallway without loosing balance. When asked, patient said she couldn't help herself. Patient's skin was assessed, no abrasions or bruising noted. Patient denied pain. The bed mattress was placed on the floor, as patient appeared to be more comfortable when she was on the floor.     Patient became more confused and restless as the evening progressed. Given Zyprexa 5 mg x 2 , Hydroxyzine 50 mg, and scheduled Seroquel 100 mg did make a difference in patient's " behaviors. At HS she became more restless and intrussive, unable to remain in her room, walking into the nurses station, having a difficult time with redirections.     Called Dr Hernandez, discussed patient's status. Patient was placed on SIO for safety due to intrusive behaviors and inability to redirect. Will follow with administering of Benadryl 50 mg PO for increased restlessness. Patient is placed on falls prec.

## 2019-03-27 NOTE — PLAN OF CARE
Patient stated that she is very worried about everyone in her family. She said she wants to go, but was unable to say where she wanted to go. She appeared emotional/tearful while talking. Patient was unable to answer questions related to psych symptoms. However, she said she does not want to be dead or to hurt self. She was able to say that's he was employed and is currently on FMLA, said she is  and has no children, just a cat. 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   Patient appeared very confused, disoriented to situation. She was requesting to be transferred to a different unit, when asked, she did not know what unit she is currently on and where she wants to be transferred. She was offered fluids and food, drank 1 cup of Amaris and 2 cups of water as encouraged by staff. Patient was observed to be using the bathroom and having urine output.   Patient's  and female friend visited patient is her room. Husbands reported that patient has been talking about seeing her coworkers in her hospital room and making paranoid statements. Per , patient was having visual and auditory hallucinations, and was being paranoid.     Patient became more confused and restless as the evening progressed. Given Zyprexa 5 mg x 2 , Hydroxyzine 50 mg, and scheduled Seroquel 100 mg did make a difference in patient's behaviors. At HS she became more restless and intrussive, unable to remain in her room, walking into the nurses station, having a difficult time with redirections.     Called Dr Hernandez, discussed patient's status. Patient was placed on SIO for safety due to intrusive behaviors and inability to redirect. Will follow with administering of Benadryl 50 mg PO for increased restlessness.

## 2019-03-27 NOTE — PROGRESS NOTES
"Face-to Face evaluation:  She is awake.  Her restraints were loosened slightly.  She did not appear to comprehend events that led to 5-point restraints.  Said she felt \"guilty\" about not \"making friends with peers and staff.\"  No physical harm due to restraint.  "

## 2019-03-27 NOTE — PROGRESS NOTES
Call from pt's , Daniel. He wanted to report on his visit with pt last night and conversation with her this morning. States pt was very concerned that family were falling over any time they leaned in any direction. Also that pt thought that things on the ceiling are actually gas conduits for a gas chamber. Increased paranoia.

## 2019-03-27 NOTE — PROGRESS NOTES
[facility], [place]   Psychiatric Progress Note  Hospital Day: 3        Interim History:   The patient's care was discussed with the treatment team during the daily team meeting and/or staff's chart notes were reviewed.  Staff reports that patient began to worsen during the day yesterday and was placed on an SIO again around 1030 PM. Around 130 AM, the patient wrapped a sports bra around her neck. This along with other out of control behaviors (attempting to jump over nurse station desk) led to 5-pt restraints.    I was paged for this patient just before 8am and RN indicated that PRN olanzapine had not been helpful and asked for alternative interventions. I gave verbal orders for haloperidol 5, lorazepam 2, and diphenhydramine 50 mg as I did not have ready access to a computer.    When I met with the patient around 1100, she was much more organized. She was in group. She was able to adequately discuss her medications. She denies that she was feeling suicidal when she wrapped the bra around her neck. She denies that she is suicidal now. She really couldn't explain why she did that, other than that she was fearful here. She denies medication side effects.    RN reported during my interview with patient that she slept 30 minutes in total last night.      Psychiatric Symptoms: disorganization, insomnia, manic symptoms, paranoia, confusion, self-harm    Medication side effects reported: none    Other issues reported by patient: none         Medications:       lurasidone  40 mg Oral Daily with lunch     QUEtiapine  200 mg Oral At Bedtime          Allergies:     Allergies   Allergen Reactions     Penicillins Unknown     Has been told she was allergic since she was a child.           Labs:     No results found for this or any previous visit (from the past 48 hour(s)).       Psychiatric Examination:     /87   Pulse 110   Temp 98.9  F (37.2  C) (Tympanic)   Resp 17   Wt 61.4 kg (135 lb  6.4 oz)   BMI 23.24 kg/m    Weight is 135 lbs 6.4 oz  Body mass index is 23.24 kg/m .    Orthostatic Vitals       Most Recent      Sitting Orthostatic /81 03/27 0700    Sitting Orthostatic Pulse (bpm) 103 03/27 0700    Standing Orthostatic BP --  Comment: declined 03/27 0700            Appearance: awake, alert, dressed in hospital scrubs and unkempt  Attitude:  cooperative  Eye Contact:  fair  Mood:  better  Affect:  constricted, anxious  Speech:  clear, coherent  Language: fluent and intact in English  Psychomotor, Gait, Musculoskeletal:  no evidence of tardive dyskinesia, dystonia, or tics and intact station, gait and muscle tone  Throught Process:  illogical  Associations:  no loose associations  Thought Content:  no evidence of suicidal ideation or homicidal ideation and paranoid  Insight:  limited  Judgement:  limited  Oriented to:  person and place. Didn't know day of the week.  Attention Span and Concentration:  intact  Recent and Remote Memory:  intact  Fund of Knowledge:  appropriate    Clinical Global Impressions  First:  Considering your total clinical experience with this particular patient population, how severe are the patient's symptoms at this time?: 7 (03/25/19 1318)  Compared to the patient's condition at the START of treatment, this patient's condition is:: 4 (03/25/19 1318)  Most recent:  Considering your total clinical experience with this particular patient population, how severe are the patient's symptoms at this time?: 7 (03/25/19 1318)  Compared to the patient's condition at the START of treatment, this patient's condition is:: 4 (03/25/19 1318)           Precautions:     Behavioral Orders   Procedures     Code 1 - Restrict to Unit     Elopement precautions     Fall precautions     Routine Programming     As clinically indicated     Sexual precautions     15 minutes after admission patient hugging male patient.     Status 15     Every 15 minutes.     Status Individual Observation      Patient throws self on the floor intentionally. Unable to follow directions.     Order Specific Question:   CONTINUOUS 24 hours / day     Answer:   5 feet     Order Specific Question:   Indications for SIO     Answer:   Severe intrusiveness     Order Specific Question:   Indications for SIO     Answer:   Self-injury risk          Diagnoses:   1. Bipolar disorder, currently manic  2. Generalized anxiety disorder         Assessment & Plan:       Target psychiatric symptoms and interventions:  Xiomara  -Increase Seroquel 200 mg at bedtime.  -trial of lurasidone increase 40 mg with lunch  -discontinue PRN trazodone and olanzapine  -add PRN haloperidol for psychosis and xiomara  -add PRN diphenhydramine for side effects related to haloperidol. May also be used for anxiety or insomnia  -add PRN lorazepam for anxiety and agitation (may also help with any akithisia haloperidol causes) may also use for insomnia if Seroquel and diphenhydramine are ineffective.    Medical Problems and Treatments:  None acute    Behavioral/Psychological/Social:  Encourage unit programming    Continue 1:1 staff at this time due to disorganization, agitation, and and wrapping clothing around her neck in the middle of the night.    Disposition Plan   Reason for ongoing admission: is unable to care for self due to severe psychosis or xiomara  Discharge location: home with family  Discharge Medications: not ordered  Follow-up Appointments: not scheduled  Legal Status: voluntary  Entered by: Shon Hills on 3/27/2019 at 3:37 PM

## 2019-03-28 ENCOUNTER — TELEPHONE (OUTPATIENT)
Facility: CLINIC | Age: 34
End: 2019-03-28

## 2019-03-28 PROCEDURE — 12400001 ZZH R&B MH UMMC

## 2019-03-28 PROCEDURE — 25000132 ZZH RX MED GY IP 250 OP 250 PS 637: Performed by: PSYCHIATRY & NEUROLOGY

## 2019-03-28 RX ADMIN — HALOPERIDOL 5 MG: 5 TABLET ORAL at 11:49

## 2019-03-28 RX ADMIN — HALOPERIDOL 5 MG: 5 TABLET ORAL at 16:48

## 2019-03-28 RX ADMIN — QUETIAPINE FUMARATE 200 MG: 200 TABLET ORAL at 20:06

## 2019-03-28 RX ADMIN — LORAZEPAM 2 MG: 1 TABLET ORAL at 16:48

## 2019-03-28 RX ADMIN — HALOPERIDOL 5 MG: 5 TABLET ORAL at 05:43

## 2019-03-28 RX ADMIN — DIPHENHYDRAMINE HYDROCHLORIDE 50 MG: 50 CAPSULE ORAL at 05:43

## 2019-03-28 RX ADMIN — DIPHENHYDRAMINE HYDROCHLORIDE 50 MG: 50 CAPSULE ORAL at 16:48

## 2019-03-28 RX ADMIN — LURASIDONE HYDROCHLORIDE 40 MG: 20 TABLET, FILM COATED ORAL at 11:48

## 2019-03-28 RX ADMIN — DIPHENHYDRAMINE HYDROCHLORIDE 50 MG: 50 CAPSULE ORAL at 11:49

## 2019-03-28 ASSESSMENT — ACTIVITIES OF DAILY LIVING (ADL)
DRESS: INDEPENDENT
LAUNDRY: WITH SUPERVISION
HYGIENE/GROOMING: INDEPENDENT
ORAL_HYGIENE: INDEPENDENT

## 2019-03-28 NOTE — PROVIDER NOTIFICATION
Patient signed 12 hour intent to leave.    MD responded via telephone and 72 hour hold placed. Patient seen by same MD 3/27/19.    Patient observed to place pillowcase around neck in a noose fashion.    Patient continues on SIO r/t risk of self-harm.    Nursing will continue to monitor.

## 2019-03-28 NOTE — PROGRESS NOTES
"Staff alerted RN writer that pt had lowered herself to the ground and stated that she fell. Pt was resting on her back in the hallway when writer approached. Pt introduced herself. When asked why patient was on the ground, pt stated \"I fell.\" Writer asked if patient was having any dizziness or light-headedness and patient denied. Pt was asked if she tripped over something and she denied. When asked why she fell, pt stated \"I don't know, gravity.\" Pt refused to have staff check her vital signs.     Pt then requested her \"medications.\" When asked what she would like to take patient stated \"anything.\" Pt was sobbing, visibly upset, and stated she was very anxious. Pt appears irritable and agitated. It was explained to patient the PRN she had available was ativan, haldol, and benadryl. Pt stated she had had this before and requested it.     Pt agreed to lay down and have staff assist her when she wanted to get up.   "

## 2019-03-28 NOTE — TELEPHONE ENCOUNTER
Prior Authorization Approval    Authorization Effective Date: 2/26/2019  Authorization Expiration Date: 3/27/2020  Medication: Latuda 40mg  Approved Dose/Quantity: 1 tablet daily  Reference #: CMM Key: [id number] - [place] Case ID: [id number]   Insurance Company: [employer] - Phone [phone] Fax [phone]  Expected CoPay: $0.00     CoPay Card Available: No    Foundation Assistance Needed: n/a  Which Pharmacy is filling the prescription (Not needed for infusion/clinic administered): n/a - Patient has not yet been discharged, and there are no outpatient orders for this medication yet    Annetta Crystal  [place] Discharge Pharmacy Liaison     [facility]  [address] [place] [id number]   [email or web address]  [email or web address]   Phone: [phone]  Pager: [phone]  Fax: [phone]

## 2019-03-28 NOTE — PROGRESS NOTES
Pt slept 2.5 hrs. At 0530, pt endorses anxiety, confusion, disoriented, tearful. Pt appears restless. One minute on the floor the other on her bed. Prn haldol and benadryl given at 0540. Pt calmer laying in bed at the end of shift. Will continue to monitor.

## 2019-03-28 NOTE — TELEPHONE ENCOUNTER
PA Initiation    Medication: Latuda 40mg  Insurance Company: [employer] - Phone [phone] Fax [phone]  Pharmacy Filling the Rx: n/a - Patient has not yet been discharged, and there are no outpatient orders for this medication yet  Start Date: 3/28/2019  [facility] Key: [id number] - [place] Case ID: [id number]    [facility]  [address] [place] [address] [address] [place] [address]   [email or web address]  [email or web address]   Phone: [phone]  Pager: [phone]  Fax: [phone]

## 2019-03-28 NOTE — PROGRESS NOTES
Pt continues to appear confused, restless and paranoid . Pacing the halls and trying door handles asking to leave .  Pt attempted to rap her pillowcase, blanket and towel around her neck this shift. Pt blanket , sheets and pillowcases were taken out of her room.  Pt ate some of her meals and attended group for a little while. Pt also took a shower with writer's supervision. No further concerns.        03/28/19 1404   Behavioral Health   Hallucinations appears responding   Thinking distractable;paranoid;poor concentration   Orientation person: oriented;place: oriented   Memory baseline memory   Insight poor   Judgement impaired   Eye Contact at examiner   Affect blunted, flat;tense;irritable   Mood anxious   Physical Appearance/Attire neat   Hygiene well groomed   Suicidality active   1. Wish to be Dead Yes   Wish to be Dead Description (none stated )   2. Non-Specific Active Suicidal Thoughts  Yes   Self Injury active  (attempted to rap pillow case and blanet around neck.)   Activity hyperactive (agitated, impulsive);restless   Speech flight of ideas   Medication Sensitivity no stated side effects;no observed side effects   Psychomotor / Gait balanced;steady   Psycho Education   Type of Intervention 1:1 intervention   Response refuses   Treatment Detail (observation )   Activities of Daily Living   Hygiene/Grooming independent   Oral Hygiene independent   Dress independent   Laundry with supervision   Room Organization independent

## 2019-03-28 NOTE — PROGRESS NOTES
Pt states the thoughts of killing self come and go. Pt denies SI/ SIB on this shift and none was observed. Pt was visible in the milieu and engaged with peers by playing games earlier on the shift. Pt was independent in all cares and asked staff to take a shower. Towards the end of the shift, Pt needed explanation several times as to why the SIO was in place. Pt appeared irritated. Pt also asked about having her personal clothing and needed explanation several times why she could not have them (elopement and suicide precaution). Pt had a visit from  and sister.        03/27/19 2200   Behavioral Health   Hallucinations denies / not responding to hallucinations   Thinking poor concentration;delusional;paranoid   Orientation person: oriented;place: oriented;date: oriented;time: oriented   Memory baseline memory   Insight poor   Judgement impaired   Eye Contact at examiner   Affect full range affect;sad   Mood depressed;hopeless;anxious   Physical Appearance/Attire attire appropriate to age and situation   Hygiene well groomed   Suicidality thoughts only  (States the thoughts comes and goes )   1. Wish to be Dead No   2. Non-Specific Active Suicidal Thoughts  No   3. Active Sucidal Ideation with any Methods (Not Plan) Without Intent to Act  No   4. Active Suicidal Ideation with Some Intent to Act, Without Specific Plan  No   5. Active Suicidal Ideation with Specific Plan and Intent  No   Duration (Lifetime) NA   Change in Protective Factors? No   Enviromental Risk Factors None   Self Injury other (see comment)  (none observed/stated on this shift )   Elopement (Pt did not appear to be at risk on this shift )   Activity other (see comment)  (in the milieu )   Speech clear;coherent   Medication Sensitivity no stated side effects;no observed side effects   Psychomotor / Gait balanced;steady   Psycho Education   Type of Intervention 1:1 intervention   Response participates with cues/redirection   Hours 0.5    Treatment Detail (Check in )   Activities of Daily Living   Hygiene/Grooming independent   Oral Hygiene independent   Dress independent   Laundry unable to complete   Room Organization independent

## 2019-03-28 NOTE — PROGRESS NOTES
Call from pt's , Daniel. Discussed pt's progress and recent events including her request to discharge and placement of the 72 hour hold.

## 2019-03-28 NOTE — PROGRESS NOTES
"Patient has wrapped items around neck 3X this shift.    Towels removed followed by removal of all linens.    Patient does not respond to questions about SI/SIB stating \"I can get better at home.\"     Multiple attempts at various doors by patient observed by staff. PRN haldol with bPRN benadryl given (by other) at 1150 with staff reporting patient communication improvement.    Nursing will continue to monitor.    "

## 2019-03-28 NOTE — PLAN OF CARE
"  OT General Care Plan  OT Goal 1  Pt will demonstrate consistent engagement in OT group activities that support recovery as evidenced by participating in >1 scheduled OT group/day for 5 days.      Pt actively participated in about x10 minutes (no charge) of a structured occupational therapy group with a focus on visuospatial problem solving and social engagement via a group game. Pt demonstrated limited understanding of the novel 3-step task after an initial explanation, and required assistance when it was her turn. Avis and impulsive in her task approach. She seemingly spontaneously became tearful, and apologized to group members, stating \"sorry for any trouble I've caused,\" though had been sitting calmly and quietly before making this statement. She was minimally receptive to encouragement offered by peers, and chose to leave group shortly after. Will continue to assess.       "

## 2019-03-29 PROCEDURE — 12400001 ZZH R&B MH UMMC

## 2019-03-29 PROCEDURE — 99233 SBSQ HOSP IP/OBS HIGH 50: CPT | Performed by: PSYCHIATRY & NEUROLOGY

## 2019-03-29 PROCEDURE — G0177 OPPS/PHP; TRAIN & EDUC SERV: HCPCS

## 2019-03-29 PROCEDURE — 25000132 ZZH RX MED GY IP 250 OP 250 PS 637: Performed by: PSYCHIATRY & NEUROLOGY

## 2019-03-29 RX ORDER — LURASIDONE HYDROCHLORIDE 80 MG/1
80 TABLET, FILM COATED ORAL
Status: DISCONTINUED | OUTPATIENT
Start: 2019-03-29 | End: 2019-04-09 | Stop reason: HOSPADM

## 2019-03-29 RX ADMIN — LURASIDONE HYDROCHLORIDE 80 MG: 80 TABLET, FILM COATED ORAL at 12:44

## 2019-03-29 RX ADMIN — LORAZEPAM 2 MG: 1 TABLET ORAL at 18:17

## 2019-03-29 RX ADMIN — QUETIAPINE FUMARATE 200 MG: 200 TABLET ORAL at 19:37

## 2019-03-29 ASSESSMENT — ACTIVITIES OF DAILY LIVING (ADL)
DRESS: INDEPENDENT
ORAL_HYGIENE: INDEPENDENT
LAUNDRY: UNABLE TO COMPLETE
HYGIENE/GROOMING: INDEPENDENT

## 2019-03-29 NOTE — PROGRESS NOTES
[facility], [place]   Psychiatric Progress Note  Hospital Day: 5        Interim History:   The patient's care was discussed with the treatment team during the daily team meeting and/or staff's chart notes were reviewed.  Staff reports that patient signed 12-hour intent to discharge on 3/28. Earlier in the day she had wrapped tings around her neck in front of staff 3 separate times. All bedding and any nonessential items that she could wrap around her neck were removed from room. I placed her on a hold on 3/28 as she would not rescind. She continues to be disorganized and paranoid and indicates a desire to be at home.    When I met with the patient I discussed the legal process and the implications of the 72-hour hold. She struggled with the details, but in the end agreed to sign in and stay with the expectation that she likely will be here for the majority of next week and possibly longer. She is agreeable to further increase in her lurasidone.      I received a text page later in the day saying that patient was reporting to be a vegetarian and wanted the diet order changed.      Psychiatric Symptoms: disorganization, insomnia, manic symptoms, paranoia, confusion, self-harm    Medication side effects reported: none    Other issues reported by patient: none         Medications:       lurasidone  80 mg Oral Daily with lunch     QUEtiapine  200 mg Oral At Bedtime          Allergies:     Allergies   Allergen Reactions     Penicillins Unknown     Has been told she was allergic since she was a child.           Labs:     No results found for this or any previous visit (from the past 48 hour(s)).       Psychiatric Examination:     /81   Pulse 106   Temp 97.9  F (36.6  C) (Tympanic)   Resp 16   Wt 61.4 kg (135 lb 6.4 oz)   SpO2 97%   BMI 23.24 kg/m    Weight is 135 lbs 6.4 oz  Body mass index is 23.24 kg/m .    Orthostatic Vitals       Most Recent      Sitting Orthostatic /86  03/29 0852    Sitting Orthostatic Pulse (bpm) 100 03/29 0852    Standing Orthostatic /78 03/29 0852    Standing Orthostatic Pulse (bpm) 126 03/29 0852          Appearance: awake, alert, dressed in hospital scrubs and unkempt  Attitude:  cooperative  Eye Contact:  fair  Mood:  anxious  Affect:  constricted, anxious  Speech:  clear, coherent  Language: fluent and intact in English  Psychomotor, Gait, Musculoskeletal:  no evidence of tardive dyskinesia, dystonia, or tics and intact station, gait and muscle tone  Throught Process:  illogical  Associations:  no loose associations  Thought Content:  no evidence of suicidal ideation or homicidal ideation and paranoid  Insight:  limited  Judgement:  limited  Oriented to:  person and place. Didn't know day of the week.  Attention Span and Concentration:  intact  Recent and Remote Memory:  intact  Fund of Knowledge:  appropriate    Clinical Global Impressions  First:  Considering your total clinical experience with this particular patient population, how severe are the patient's symptoms at this time?: 7 (03/25/19 1318)  Compared to the patient's condition at the START of treatment, this patient's condition is:: 4 (03/25/19 1318)  Most recent:  Considering your total clinical experience with this particular patient population, how severe are the patient's symptoms at this time?: 7 (03/25/19 1318)  Compared to the patient's condition at the START of treatment, this patient's condition is:: 4 (03/25/19 1318)           Precautions:     Behavioral Orders   Procedures     Code 1 - Restrict to Unit     Elopement precautions     Fall precautions     Routine Programming     As clinically indicated     Sexual precautions     15 minutes after admission patient hugging male patient.     Status 15     Every 15 minutes.     Status Individual Observation     Has tried to tie items around her neck multiple time     Order Specific Question:   CONTINUOUS 24 hours / day     Answer:   5  feet     Order Specific Question:   Indications for SIO     Answer:   Severe intrusiveness     Order Specific Question:   Indications for SIO     Answer:   Self-injury risk     Order Specific Question:   Indications for SIO     Answer:   Suicide risk          Diagnoses:   1. Bipolar disorder, currently manic  2. Generalized anxiety disorder         Assessment & Plan:       Target psychiatric symptoms and interventions:  Xiomara  -Seroquel 200 mg at bedtime.  -increase lurasidone 80 mg with lunch  -continue PRN haloperidol for psychosis and xiomara  -continue PRN diphenhydramine for side effects related to haloperidol. May also be used for anxiety or insomnia  -continue PRN lorazepam for anxiety and agitation (may also help with any akithisia haloperidol causes) may also use for insomnia if Seroquel and diphenhydramine are ineffective.    Medical Problems and Treatments:  None acute    Behavioral/Psychological/Social:  Encourage unit programming    Continue 1:1 staff at this time due to disorganization, agitation, intrusiveness and and wrapping clothing around her neck    Disposition Plan   Reason for ongoing admission: is unable to care for self due to severe psychosis or xiomara  Discharge location: home with family  Discharge Medications: not ordered  Follow-up Appointments: not scheduled  Legal Status: voluntary  Entered by: Shon Hills on 3/29/2019 at 3:54 PM

## 2019-03-29 NOTE — PROGRESS NOTES
Call from pt's . States that pt seems better but still having paranoia about what staff thinks or making comments that don't make sense. Also has some improved memory yet remains disorganized.

## 2019-03-29 NOTE — PROGRESS NOTES
INITIAL OT ASSESSMENT       03/26/19 1500   General Information   Date Initially Attended OT 03/26/19   Special Considerations see note   Clinical Impression   Affect Anxious;Fluctuates   Orientation Oriented to person;Oriented to place   Appearance and ADLs General cleanliness observed in most areas   Attention to Internal Stimuli No observed signs   Interaction Skills Withdrawn;Interacts appropriately with staff;Interacts appropriately with peers   Ability to Communicate Needs Does so with prompts   Verbal Content Clear;Appropriate to topic   Ability to Maintain Boundaries Maintains appropriate physical boundaries;Maintains appropriate verbal boundaries  (appropriate boundaries in group)   Participation Independently participates   Concentration Concentrates 10-20 minutes   Ability to Concentrate With structure   Follows and Comprehends Directions Independently follows 1 step verbal directions   Memory Delayed and immediate recall intact   Organization Independently organizes simple tasks   Decision Making Needs choices limited to 3 choices   Planning and Problem Solving Occasionally needs assist/feedback   Ability to Apply and Learn Concepts Needs further assessment   Frustrations / Stress Tolerance Independently identifies sources of frustration/stress   Level of Insight Some insight   Self Esteem Can identify positives;Takes risks with support and encouragement   Social Supports Has knowledge of support systems

## 2019-03-29 NOTE — PLAN OF CARE
"  OT General Care Plan  OT Goal 1  Pt will demonstrate consistent engagement in OT group activities that support recovery as evidenced by participating in >1 scheduled OT group/day for 5 days.      Pt attended 2 out of 3 OT groups offered. Pt actively participated in a structured occupational therapy group with a focus on connecting song titles to life events and personal feelings. Using a list of song titles, pt identified A Hard Day's Night (\"it's been hard being here in the hospital, but now I know it's been hard for everyone and not just me\"), Stand By Me (related this to supportive family members), I Can See Clearly Now (\"now that I've talked to my doctor\"), Matter of Trust, Praying, and I Am A Rock (\"because I have a complicated background\") as song titles that relate to her life, as well as Walking on Sunshine as a song title that indicates something about her future. She became very tearful when sharing her song selections, though was persistent and explained why she selected each song. Pt then completed a visual scanning task while listening to identified songs to facilitate focus and organization. Pt actively participated in occupational therapy clinic. Pt was able to ask for assistance as needed, and returned to a structured creative expression task with assistance in task set-up. Pt demonstrated fair focus and planning. Occasionally initiated conversation with peers. Calm and cooperative throughout groups today. She appeared anxious, though brightened briefly in interactions. Will continue to assess.    OT Self-Assessment  Pt was given and completed a written self-assessment form. OT staff reviewed with pt and explained the value of having them involved in their treatment plan, and provided options to meet current needs/self-identified goals.     Pt identified \"(1) stress - performance based: graduate school, work; (2) suicide attempt during psychosis; (3) sleep deprivation; (4) further episodes\" as " "stressors/events that led to hospitalization.    Pt identified the following symptoms that they are currently dealing with:   Emotions: Sadness, anxiety/fear, and feeling abandoned   Thoughts: Trouble concentrating and slowed thinking  Behaviors: Eating disorder (\"recovering\")    Self-identified coping skills: Talking to someone, relaxation, writing in a journal, hobbies, and exercise  Self-identified social supports: Therapist (\"Cherie Leader - with relationships insight\")  Self-identified personal strengths: \"Artistic, creative, family, friends, athletics, music\"    Goals: Work on accepting \"my diagnosis, learning more about it,\" \"Support group or day treatment,\" \"Forgiving myself for my illness,\" and \"Patience\"         "

## 2019-03-29 NOTE — PROGRESS NOTES
Pt appears much more calmer today than previous shifts and more organized. No SIB behavior observed this shift and pt denies any SI/SIB urges/thoughts today. Pt reports feeling 'sad, anxious, and wants to go home'. Pt showered today and was compliant with unit expectations. Pt expressed interest in what her diagnosis was, medication management and day treatment as an option in the future. Pt is tearful at times, attended groups, and did not require any redirection.        03/29/19 1211   Behavioral Health   Hallucinations denies / not responding to hallucinations   Thinking distractable;poor concentration   Orientation person: oriented;place: oriented   Memory baseline memory   Insight poor   Judgement impaired   Eye Contact at examiner   Affect sad;blunted, flat   Mood depressed;anxious   Physical Appearance/Attire attire appropriate to age and situation   Hygiene well groomed   Suicidality other (see comments)  (denies)   1. Wish to be Dead No   2. Non-Specific Active Suicidal Thoughts  No   Enviromental Risk Factors Other (see comments)   Self Injury (denies)   Elopement Statements about wanting to leave   Activity withdrawn   Speech clear;coherent   Psychomotor / Gait balanced;steady   Psycho Education   Type of Intervention 1:1 intervention   Response participates, initiates socially appropriate   Hours 0.5   Treatment Detail check in   Activities of Daily Living   Hygiene/Grooming independent   Oral Hygiene independent   Dress independent   Laundry unable to complete   Room Organization independent

## 2019-03-30 PROCEDURE — 12400001 ZZH R&B MH UMMC

## 2019-03-30 PROCEDURE — 25000132 ZZH RX MED GY IP 250 OP 250 PS 637: Performed by: PSYCHIATRY & NEUROLOGY

## 2019-03-30 RX ADMIN — HALOPERIDOL 5 MG: 5 TABLET ORAL at 09:38

## 2019-03-30 RX ADMIN — DIPHENHYDRAMINE HYDROCHLORIDE 50 MG: 50 CAPSULE ORAL at 09:38

## 2019-03-30 RX ADMIN — LURASIDONE HYDROCHLORIDE 80 MG: 80 TABLET, FILM COATED ORAL at 15:15

## 2019-03-30 RX ADMIN — QUETIAPINE FUMARATE 200 MG: 200 TABLET ORAL at 22:50

## 2019-03-30 RX ADMIN — LORAZEPAM 2 MG: 1 TABLET ORAL at 09:38

## 2019-03-30 ASSESSMENT — ACTIVITIES OF DAILY LIVING (ADL)
DRESS: INDEPENDENT
HYGIENE/GROOMING: INDEPENDENT
HYGIENE/GROOMING: INDEPENDENT
ORAL_HYGIENE: INDEPENDENT
ORAL_HYGIENE: INDEPENDENT
DRESS: INDEPENDENT
LAUNDRY: WITH SUPERVISION
LAUNDRY: WITH SUPERVISION

## 2019-03-30 NOTE — PROGRESS NOTES
"Pt spent majority of day shift in her room resting. Pt did leave room for breakfast and lunch and was minimally social w/peers during this time. PT denies depressive symptoms. Pt rates anxiety 5/10. Pt denies psychotic symptoms. Pt reports \"good\" appetite and sleep. Pt denies SI and SIB. Pt was able to shower today. Pt asked writer when she could get her clothes and \"other stuff\" back. Writer informed pt that this is something the doctor would have to decide. Pt was accepting and agreeable to writer's response. Pt was calm and cooperative w/staff. PT had an uneventful shift.      03/30/19 1400   Behavioral Health   Hallucinations denies / not responding to hallucinations   Thinking distractable   Orientation place: oriented;person: oriented;date: oriented;time: oriented   Memory baseline memory   Insight poor   Judgement impaired   Eye Contact at examiner   Affect blunted, flat   Mood mood is calm;anxious   Physical Appearance/Attire attire appropriate to age and situation   Hygiene well groomed   Suicidality (Denies SI)   1. Wish to be Dead No   2. Non-Specific Active Suicidal Thoughts  No   Self Injury (Denies SIB)   Elopement (No elopment concerns)   Activity isolative;withdrawn  (visible in milieu, minimally social w/peers)   Speech clear;coherent   Medication Sensitivity no observed side effects;no stated side effects   Psychomotor / Gait balanced;steady   Activities of Daily Living   Hygiene/Grooming independent   Oral Hygiene independent   Dress independent   Laundry with supervision   Room Organization independent     "

## 2019-03-31 PROCEDURE — H2032 ACTIVITY THERAPY, PER 15 MIN: HCPCS

## 2019-03-31 PROCEDURE — 25000132 ZZH RX MED GY IP 250 OP 250 PS 637: Performed by: PSYCHIATRY & NEUROLOGY

## 2019-03-31 PROCEDURE — 12400001 ZZH R&B MH UMMC

## 2019-03-31 PROCEDURE — G0177 OPPS/PHP; TRAIN & EDUC SERV: HCPCS

## 2019-03-31 RX ORDER — TRAZODONE HYDROCHLORIDE 50 MG/1
50 TABLET, FILM COATED ORAL
Status: DISCONTINUED | OUTPATIENT
Start: 2019-03-31 | End: 2019-04-09 | Stop reason: HOSPADM

## 2019-03-31 RX ORDER — HYDROXYZINE HYDROCHLORIDE 50 MG/1
50 TABLET, FILM COATED ORAL EVERY 6 HOURS PRN
Status: DISCONTINUED | OUTPATIENT
Start: 2019-03-31 | End: 2019-03-31 | Stop reason: CLARIF

## 2019-03-31 RX ORDER — HYDROXYZINE HYDROCHLORIDE 25 MG/1
25-50 TABLET, FILM COATED ORAL EVERY 4 HOURS PRN
Status: DISCONTINUED | OUTPATIENT
Start: 2019-03-31 | End: 2019-04-09 | Stop reason: HOSPADM

## 2019-03-31 RX ORDER — HYDROXYZINE HYDROCHLORIDE 25 MG/1
25 TABLET, FILM COATED ORAL EVERY 4 HOURS PRN
Status: DISCONTINUED | OUTPATIENT
Start: 2019-03-31 | End: 2019-03-31 | Stop reason: CLARIF

## 2019-03-31 RX ADMIN — LURASIDONE HYDROCHLORIDE 80 MG: 80 TABLET, FILM COATED ORAL at 13:27

## 2019-03-31 RX ADMIN — TRAZODONE HYDROCHLORIDE 50 MG: 50 TABLET ORAL at 22:31

## 2019-03-31 RX ADMIN — QUETIAPINE FUMARATE 200 MG: 200 TABLET ORAL at 20:43

## 2019-03-31 ASSESSMENT — ACTIVITIES OF DAILY LIVING (ADL)
LAUNDRY: WITH SUPERVISION
DRESS: INDEPENDENT;SCRUBS (BEHAVIORAL HEALTH)
HYGIENE/GROOMING: INDEPENDENT
ORAL_HYGIENE: INDEPENDENT

## 2019-03-31 NOTE — PLAN OF CARE
"Pt remains on SIO, was up and out of room early this shift. Noted to sit quietly in lounge with minimal to no interactions with peers.  Did engage some during breakfast. Reports she is doing better, thinking clearer and has been asking if provider could discontinue SIO as is getting tired been watched and followed around. Affect flat and blunted, pt presents as exhausted/physically worn out. Endorses some anxiety \" I should be on some anxiety medication but am only taking Latuda\" Writer encourage use of PRN. When ask about daily goal, had stated \" I don't know what to do, you think I should attend some groups? Pt seem to ramirez up significantly and more spontaneous when spouse visited. Was observed being affectionate, smiling with full range affect. Played cards with spouse and visit seem to have gone well. Pt noted to deliberately speak in whispers to spouse,leaning over multiple times and speaking directly into his ear as if making sure staff does not hear the conversation. Pt denies SI/SIB.Thinks is eating well and sleeping much better. No overt manic symptoms noted.  "

## 2019-04-01 PROCEDURE — 99233 SBSQ HOSP IP/OBS HIGH 50: CPT | Performed by: PSYCHIATRY & NEUROLOGY

## 2019-04-01 PROCEDURE — H2032 ACTIVITY THERAPY, PER 15 MIN: HCPCS

## 2019-04-01 PROCEDURE — 12400001 ZZH R&B MH UMMC

## 2019-04-01 PROCEDURE — 25000132 ZZH RX MED GY IP 250 OP 250 PS 637: Performed by: PSYCHIATRY & NEUROLOGY

## 2019-04-01 RX ADMIN — HYDROXYZINE HYDROCHLORIDE 50 MG: 25 TABLET ORAL at 13:20

## 2019-04-01 RX ADMIN — DIPHENHYDRAMINE HYDROCHLORIDE 50 MG: 50 CAPSULE ORAL at 17:47

## 2019-04-01 RX ADMIN — LORAZEPAM 2 MG: 1 TABLET ORAL at 17:47

## 2019-04-01 RX ADMIN — QUETIAPINE FUMARATE 200 MG: 200 TABLET ORAL at 20:44

## 2019-04-01 RX ADMIN — HALOPERIDOL 5 MG: 5 TABLET ORAL at 17:47

## 2019-04-01 RX ADMIN — TRAZODONE HYDROCHLORIDE 50 MG: 50 TABLET ORAL at 20:44

## 2019-04-01 RX ADMIN — LURASIDONE HYDROCHLORIDE 80 MG: 80 TABLET, FILM COATED ORAL at 13:20

## 2019-04-01 ASSESSMENT — ACTIVITIES OF DAILY LIVING (ADL)
ORAL_HYGIENE: INDEPENDENT
HYGIENE/GROOMING: INDEPENDENT
LAUNDRY: WITH SUPERVISION
DRESS: INDEPENDENT
DRESS: INDEPENDENT
ORAL_HYGIENE: INDEPENDENT
ORAL_HYGIENE: INDEPENDENT
DRESS: INDEPENDENT
HYGIENE/GROOMING: INDEPENDENT
LAUNDRY: WITH SUPERVISION
HYGIENE/GROOMING: INDEPENDENT
LAUNDRY: WITH SUPERVISION

## 2019-04-01 NOTE — PLAN OF CARE
"  Adult Behavioral Health Plan of Care  Plan of Care Review  4/1/2019 0033 - No Change by Dallas Velasquez RN    48 Hour Assessment:  Patient has a flat affect and is withdrawn. She endorses anxiety and depression. Pt states \"she is not sure who to talk to here.\" She feels she is not getting better since admission and expresses \"sadness\". Denies SI and SIB. Pt request for Trazodone 50 mg at bedtime. PRN ordered and given. Med compliant. Denies any SE's or acute physical distress. No further complaints or requests at this time. VSS.      "

## 2019-04-01 NOTE — PROGRESS NOTES
03/31/19 2200   Therapeutic Recreation   Type of Intervention structured groups   Activity game   Response Participates, initiates socially appropriate   Hours 1     Pt participated in Therapeutic Recreation group with focus on leisure participation and strategic cognitive reasoning.  Engaged and cooperative in group recreational intervention; [email or web address] game. Pt participated throughout entire duration of the group. Pt stated this game was fun and be something she could use as an activity at her work. Pt showed progress in goals. Pt mood was calm and appropriate with interactions.

## 2019-04-01 NOTE — PROGRESS NOTES
[facility] returned  Daniel's voice mail, [phone]. states pt seems to be doing better. Pt has been more talkative and bright; yesterday pt seemed more anxious and indecisive about little issues such as getting water, where to sit. Does seem to have improved memory. Discussed the observation by staff that pt is brighter and demonstrates more affect when family visits. Daniel reports that pt does still have some paranoia concerning staff and feeling that staff are watching her.

## 2019-04-01 NOTE — PROGRESS NOTES
04/01/19 1500   Significant Event   Significant Event Other (see comments)  (pt cont to show SIB)     This writer saw pt twist her sheet and wrap and place it behind her neck. When asked if she was OK she began to cry hysterically. Pt agreed to take a PRN and spoke with her RN. She shortly therafter kneeled in her room and lightly banged her head on the floor. Pt calmed with encouragement and eventually fell asleep.

## 2019-04-01 NOTE — PROGRESS NOTES
Pt was isolative and withdrawn, however made it to some of the groups. Blunted/flat affect , guarded on approach. Pt continues to appear depressed,  anxious and disorganized. Pt was less interactive with staff and peers this shift . Sister visited and had a short visit. Pt ate all meals, no further concerns.        04/01/19 1415   Behavioral Health   Hallucinations denies / not responding to hallucinations   Thinking confused;distractable   Orientation person: oriented;place: oriented   Memory baseline memory   Insight poor   Judgement impaired   Eye Contact at floor   Affect blunted, flat;sad   Mood anxious;mood is calm   Physical Appearance/Attire neat   Hygiene well groomed   Suicidality other (see comments)  (Pt denies)   1. Wish to be Dead No   Wish to be Dead Description (none stated)   2. Non-Specific Active Suicidal Thoughts  No   Non-Specific Active Suicidal Thought Description (none stated)   Self Injury other (see comment)  (Pt denies)   Activity isolative;withdrawn;restless   Speech other (see comments)  (delayed response.)   Medication Sensitivity no stated side effects;no observed side effects   Psychomotor / Gait balanced;steady   Psycho Education   Type of Intervention 1:1 intervention   Response participates with encouragement   Hours 0.5   Treatment Detail (check in/ observation )   Activities of Daily Living   Hygiene/Grooming independent   Oral Hygiene independent   Dress independent   Laundry with supervision   Room Organization independent

## 2019-04-02 PROCEDURE — G0177 OPPS/PHP; TRAIN & EDUC SERV: HCPCS

## 2019-04-02 PROCEDURE — 25000132 ZZH RX MED GY IP 250 OP 250 PS 637: Performed by: PSYCHIATRY & NEUROLOGY

## 2019-04-02 PROCEDURE — 99233 SBSQ HOSP IP/OBS HIGH 50: CPT | Performed by: PSYCHIATRY & NEUROLOGY

## 2019-04-02 PROCEDURE — 12400001 ZZH R&B MH UMMC

## 2019-04-02 RX ADMIN — TRAZODONE HYDROCHLORIDE 50 MG: 50 TABLET ORAL at 20:11

## 2019-04-02 RX ADMIN — LURASIDONE HYDROCHLORIDE 80 MG: 80 TABLET, FILM COATED ORAL at 12:37

## 2019-04-02 RX ADMIN — MAGNESIUM HYDROXIDE 30 ML: 400 SUSPENSION ORAL at 17:39

## 2019-04-02 RX ADMIN — QUETIAPINE FUMARATE 200 MG: 200 TABLET ORAL at 20:11

## 2019-04-02 ASSESSMENT — ACTIVITIES OF DAILY LIVING (ADL)
DRESS: INDEPENDENT
ORAL_HYGIENE: INDEPENDENT
HYGIENE/GROOMING: INDEPENDENT
HYGIENE/GROOMING: INDEPENDENT
DRESS: INDEPENDENT
LAUNDRY: WITH SUPERVISION
LAUNDRY: WITH SUPERVISION
ORAL_HYGIENE: INDEPENDENT

## 2019-04-02 NOTE — PLAN OF CARE
BEHAVIORAL TEAM DISCUSSION    Participants: Provider:MARELY Hills MD  CTC: Lucita Thorne MS,   Nurse: Kerry Beck, RN   Progress: Pt was admitted to station 32 on a 72 hour hold secondary to manuela and psychosis. Pt has signed in voluntarily. Pt manuela has decreased and she has become rather depressed. Pt has been disorganized, confused and paranoid. Has begun tracking conversations and demonstrating improved memory. Does attend some groups, typically OT. Pt has also been guarded when discussing symptoms, has told  that she believes staff are following her and watching her. Pt has had some suicide gestures by loosely wrapping things around her neck. She is on a SIO.   Continued Stay Criteria/Rationale: Pt to remain inpatient for stabilization and safety. Pt agreeable to this at this time.   Medical/Physical: see chart  Precautions:   Behavioral Orders   Procedures     Code 1 - Restrict to Unit     Elopement precautions     Fall precautions     Routine Programming     As clinically indicated     Sexual precautions     15 minutes after admission patient hugging male patient.     Status 15     Every 15 minutes.     Status Individual Observation     Order Specific Question:   CONTINUOUS 24 hours / day     Answer:   5 feet     Order Specific Question:   Indications for SIO     Answer:   Suicide risk     Plan: The patient will continue to meet w/ the treatment team for assessment and treatment planning, CTC will continue to work w/ for discharge planning.    Rationale for change in precautions or plan: Pt has been improving but remains disorganized and paranoid; she has become more depressed since her manic symptoms have decreased.

## 2019-04-02 NOTE — PROGRESS NOTES
CTC met with pt who wanted to discuss discharge plans. CTC stated recommends PHP or IOP. Pt was very flat, slow speech though tracking and oriented throughout the conversation. Eye contact good. Pt reports that she has done PHP in the past; CTC advised that this could work but observed to pt that she is not, in her current condition, able to do PHP.

## 2019-04-02 NOTE — PLAN OF CARE
"  RN 48 hour assessment:  Patient appears sad and distraught, but denies current suicidal thoughts and denies thoughts of self-harm. Yesterday patient was crying at about 1300 and she asked for medication. This writer got out Hydroxyzine to give patient, as ordered, and asked if that was what patient wanted. Patient responded, \"No,\" and this writer asked her what she was thinking of. Patient responded, \"Morphine.\" Patient could not give a reason for asking for Morphine. She appeared confused. Today, patient said, \"I just want to know the next step.\" Patient asked to speak to [facility]. Patient also asked this writer when she can get off the SIO.  "

## 2019-04-02 NOTE — PROGRESS NOTES
[facility], [place]   Psychiatric Progress Note  Hospital Day: 9        Interim History:   The patient's care was discussed with the treatment team during the daily team meeting and/or staff's chart notes were reviewed.  Staff reports that patient continues to remain very paranoid. Minimal engagement of staff. She isn't able to show that she can be safe on the unit without 1:1.     When I met with the patient she still appeared sad and scared, but did have some eye contact today and was slightly more verbal. Denies medication side effects.      Psychiatric Symptoms: disorganization, insomnia, paranoia, confusion, self-harm    Medication side effects reported: none    Other issues reported by patient: none         Medications:       lurasidone  80 mg Oral Daily with lunch     QUEtiapine  200 mg Oral At Bedtime          Allergies:     Allergies   Allergen Reactions     Penicillins Unknown     Has been told she was allergic since she was a child.           Labs:     No results found for this or any previous visit (from the past 48 hour(s)).       Psychiatric Examination:     /74 (BP Location: Left arm)   Pulse 91   Temp 97.5  F (36.4  C)   Resp 16   Wt 63.6 kg (140 lb 3.2 oz)   SpO2 97%   BMI 24.07 kg/m    Weight is 140 lbs 3.2 oz  Body mass index is 24.07 kg/m .    Orthostatic Vitals       Most Recent      Sitting Orthostatic /79 04/02 0849    Sitting Orthostatic Pulse (bpm) 105 04/02 0849    Standing Orthostatic /76 04/02 0849    Standing Orthostatic Pulse (bpm) 104 04/02 0849            Appearance: awake, alert, dressed in hospital scrubs and unkempt  Attitude:  cooperative  Eye Contact:  fair  Mood:  depressed  Affect:  tearful  Speech:  paucity of speech  Language: fluent and intact in English  Psychomotor, Gait, Musculoskeletal:  no evidence of tardive dyskinesia, dystonia, or tics and intact station, gait and muscle tone  Throught Process:   illogical  Associations:  no loose associations  Thought Content:  passive suicidal ideation present and paranoid  Insight:  limited  Judgement:  limited  Oriented to:  did not answer orientation questions  Attention Span and Concentration:  limited  Recent and Remote Memory:  fair  Fund of Knowledge:  appropriate    Clinical Global Impressions  First:  Considering your total clinical experience with this particular patient population, how severe are the patient's symptoms at this time?: 7 (03/25/19 1318)  Compared to the patient's condition at the START of treatment, this patient's condition is:: 4 (03/25/19 1318)  Most recent:  Considering your total clinical experience with this particular patient population, how severe are the patient's symptoms at this time?: 7 (04/02/19 1458)  Compared to the patient's condition at the START of treatment, this patient's condition is:: 4 (04/02/19 1458)             Precautions:     Behavioral Orders   Procedures     Code 1 - Restrict to Unit     Elopement precautions     Fall precautions     Routine Programming     As clinically indicated     Sexual precautions     15 minutes after admission patient hugging male patient.     Status 15     Every 15 minutes.     Status Individual Observation     Order Specific Question:   CONTINUOUS 24 hours / day     Answer:   5 feet     Order Specific Question:   Indications for SIO     Answer:   Suicide risk          Diagnoses:   1. Bipolar disorder, currently manic  2. Generalized anxiety disorder         Assessment & Plan:       Target psychiatric symptoms and interventions:  Xiomara  -Seroquel 200 mg at bedtime.  -lurasidone 80 mg with lunch  -continue PRN haloperidol for psychosis and xiomara  -continue PRN diphenhydramine for side effects related to haloperidol. May also be used for anxiety or insomnia  -continue PRN lorazepam for anxiety and agitation (may also help with any akithisia haloperidol causes) may also use for insomnia if Seroquel  and diphenhydramine are ineffective.    Medical Problems and Treatments:  None acute    Behavioral/Psychological/Social:  Encourage unit programming    Continue 1:1 staff at this time due to wrapping clothing around her neck    Disposition Plan   Reason for ongoing admission: is unable to care for self due to severe psychosis or manuela  Discharge location: home with family  Discharge Medications: not ordered  Follow-up Appointments: not scheduled  Legal Status: voluntary  Entered by: Shon Hills on 4/2/2019 at 2:59 PM

## 2019-04-02 NOTE — PROGRESS NOTES
Pt was in her room most of the evening shift. Pt had a visitor.  Pt denies SI/SIB and none observed on this shift. No concerns at this time.        04/01/19 2200   Behavioral Health   Hallucinations denies / not responding to hallucinations   Thinking confused;poor concentration   Orientation person: oriented;place: oriented   Memory baseline memory   Insight poor   Judgement intact   Eye Contact at examiner   Affect blunted, flat   Mood anxious;depressed   Physical Appearance/Attire neat   Hygiene well groomed   Suicidality other (see comments)  (denies )   1. Wish to be Dead No   2. Non-Specific Active Suicidal Thoughts  No   3. Active Sucidal Ideation with any Methods (Not Plan) Without Intent to Act  No   4. Active Suicidal Ideation with Some Intent to Act, Without Specific Plan  No   5. Active Suicidal Ideation with Specific Plan and Intent  No   Duration (Lifetime) NA   Enviromental Risk Factors None   Self Injury other (see comment)   Elopement (n/a)   Activity isolative;withdrawn   Speech clear   Medication Sensitivity no observed side effects;no stated side effects   Psychomotor / Gait balanced;steady   Psycho Education   Type of Intervention 1:1 intervention   Response participates with encouragement   Hours 0.5   Treatment Detail (check in )   Activities of Daily Living   Hygiene/Grooming independent   Oral Hygiene independent   Dress independent   Laundry with supervision   Room Organization independent

## 2019-04-02 NOTE — PROGRESS NOTES
[facility], [place]   Psychiatric Progress Note  Hospital Day: 8        Interim History:   The patient's care was discussed with the treatment team during the daily team meeting and/or staff's chart notes were reviewed.  Staff reports that patient continued to have fluctuating symptoms over the weekend. Although most of the documentation indicates improvements, the patient did have difficult times over the weekend as well. This information was passed on via word of mouth instead of in the chart. RN and one psych associate did feel that patient was doing well enough to come off of the 1:1 though as behaviors were not showing signs of suicide/self-harm over the weekend.    When I met with the patient she was crying and kept her face down. She didn't answer my questions other than to say she wanted to go home. I asked the psych associate present how she was doing. He indicated that she just wrapped up a sheet and put it behind her neck. She started to cry when he asked her what she was doing and then lightly banged her head on the wall. RN gave her PRN.      Psychiatric Symptoms: disorganization, insomnia, paranoia, confusion, self-harm    Medication side effects reported: none    Other issues reported by patient: none         Medications:       lurasidone  80 mg Oral Daily with lunch     QUEtiapine  200 mg Oral At Bedtime          Allergies:     Allergies   Allergen Reactions     Penicillins Unknown     Has been told she was allergic since she was a child.           Labs:     No results found for this or any previous visit (from the past 48 hour(s)).       Psychiatric Examination:     /74 (BP Location: Left arm)   Pulse 91   Temp 98.4  F (36.9  C) (Oral)   Resp 16   Wt 63.5 kg (139 lb 14.4 oz)   SpO2 97%   BMI 24.01 kg/m    Weight is 139 lbs 14.4 oz  Body mass index is 24.01 kg/m .    Orthostatic Vitals       Most Recent      Sitting Orthostatic /77 04/01 1600    Sitting  Orthostatic Pulse (bpm) 90 04/01 1600    Standing Orthostatic /78 04/01 0813    Standing Orthostatic Pulse (bpm) 98 04/01 0813            Appearance: awake, alert, dressed in hospital scrubs and unkempt  Attitude:  uncooperative  Eye Contact:  poor   Mood:  depressed  Affect:  tearful  Speech:  paucity of speech  Language: fluent and intact in English  Psychomotor, Gait, Musculoskeletal:  no evidence of tardive dyskinesia, dystonia, or tics and intact station, gait and muscle tone  Throught Process:  illogical  Associations:  no loose associations  Thought Content:  active suicidal ideation present and paranoid  Insight:  limited  Judgement:  limited  Oriented to:  did not answer orientation questions  Attention Span and Concentration:  limited  Recent and Remote Memory:  fair  Fund of Knowledge:  appropriate    Clinical Global Impressions  First:  Considering your total clinical experience with this particular patient population, how severe are the patient's symptoms at this time?: 7 (03/25/19 1318)  Compared to the patient's condition at the START of treatment, this patient's condition is:: 4 (03/25/19 1318)  Most recent:  Considering your total clinical experience with this particular patient population, how severe are the patient's symptoms at this time?: 7 (03/25/19 1318)  Compared to the patient's condition at the START of treatment, this patient's condition is:: 4 (03/25/19 1318)           Precautions:     Behavioral Orders   Procedures     Code 1 - Restrict to Unit     Elopement precautions     Fall precautions     Routine Programming     As clinically indicated     Sexual precautions     15 minutes after admission patient hugging male patient.     Status 15     Every 15 minutes.     Status Individual Observation     Order Specific Question:   CONTINUOUS 24 hours / day     Answer:   5 feet     Order Specific Question:   Indications for SIO     Answer:   Suicide risk          Diagnoses:   1. Bipolar  disorder, currently manic  2. Generalized anxiety disorder         Assessment & Plan:       Target psychiatric symptoms and interventions:  Xiomara  -Seroquel 200 mg at bedtime.  -lurasidone 80 mg with lunch  -continue PRN haloperidol for psychosis and xiomara  -continue PRN diphenhydramine for side effects related to haloperidol. May also be used for anxiety or insomnia  -continue PRN lorazepam for anxiety and agitation (may also help with any akithisia haloperidol causes) may also use for insomnia if Seroquel and diphenhydramine are ineffective.    Medical Problems and Treatments:  None acute    Behavioral/Psychological/Social:  Encourage unit programming    Continue 1:1 staff at this time due to wrapping clothing around her neck    Disposition Plan   Reason for ongoing admission: is unable to care for self due to severe psychosis or xiomara  Discharge location: home with family  Discharge Medications: not ordered  Follow-up Appointments: not scheduled  Legal Status: voluntary  Entered by: Shon Hills on 4/1/2019 at 11:24 PM

## 2019-04-02 NOTE — PLAN OF CARE
Patient attended OT clinic. She was organized and demonstrated with sustained focus. She worked slowly with a vacant affect. She was responsive to questions but did not initiate with others.

## 2019-04-02 NOTE — PLAN OF CARE
OT General Care Plan  OT Goal 1  Pt will demonstrate consistent engagement in OT group activities that support recovery as evidenced by participating in >1 scheduled OT group/day for 5 days.      Pt actively participated in a mindfulness group focusing on reduction of anxiety, improvement of mood, and increase in concentration. The mindfulness practice was offered via supportive approaches including chair yoga, progressive muscle relaxation, and journaling to decrease worry, rumination, and other negative feelings. Pt was able to remain focused for the full duration. Appeared calm at the end of group.

## 2019-04-03 PROCEDURE — 12400001 ZZH R&B MH UMMC

## 2019-04-03 PROCEDURE — 99232 SBSQ HOSP IP/OBS MODERATE 35: CPT | Performed by: PSYCHIATRY & NEUROLOGY

## 2019-04-03 PROCEDURE — G0177 OPPS/PHP; TRAIN & EDUC SERV: HCPCS

## 2019-04-03 PROCEDURE — 25000132 ZZH RX MED GY IP 250 OP 250 PS 637: Performed by: PSYCHIATRY & NEUROLOGY

## 2019-04-03 RX ADMIN — LURASIDONE HYDROCHLORIDE 80 MG: 80 TABLET, FILM COATED ORAL at 13:19

## 2019-04-03 RX ADMIN — QUETIAPINE FUMARATE 200 MG: 200 TABLET ORAL at 20:10

## 2019-04-03 RX ADMIN — HYDROXYZINE HYDROCHLORIDE 50 MG: 25 TABLET ORAL at 20:10

## 2019-04-03 ASSESSMENT — ACTIVITIES OF DAILY LIVING (ADL)
ORAL_HYGIENE: INDEPENDENT
HYGIENE/GROOMING: INDEPENDENT
LAUNDRY: UNABLE TO COMPLETE
ORAL_HYGIENE: INDEPENDENT
HYGIENE/GROOMING: INDEPENDENT
DRESS: INDEPENDENT

## 2019-04-03 NOTE — PROGRESS NOTES
"Patient  called to review medications. Patient  reports \"her mood has improved-and want her home soon.\"    Told would like to see days without gestures of wrapping things around neck.    Updated SW .  "

## 2019-04-03 NOTE — PROGRESS NOTES
04/03/19 1400   Behavioral Health   Hallucinations denies / not responding to hallucinations   Thinking poor concentration;distractable   Orientation person: oriented;place: oriented   Memory baseline memory   Insight poor   Judgement impaired   Eye Contact at examiner   Affect sad;tense;blunted, flat   Mood depressed   Physical Appearance/Attire attire appropriate to age and situation   Hygiene well groomed   1. Wish to be Dead No   2. Non-Specific Active Suicidal Thoughts  No   Activities of Daily Living   Hygiene/Grooming independent   Oral Hygiene independent   Laundry unable to complete   Room Organization independent   Patient spent the morning in and out of her bedroom. She didn't attend any groups this morning. She seems blunted and flat still. She denies si/sib today and hearing any auditory or having any visual hallucinations. She is interested in getting her clothes back and getting off elopement precautions. Her sister came to visit her today and stated that their visit went well.

## 2019-04-03 NOTE — PROGRESS NOTES
[facility], [place]   Psychiatric Progress Note  Hospital Day: 10        Interim History:   The patient's care was discussed with the treatment team during the daily team meeting and/or staff's chart notes were reviewed.  Staff reports that the patient has shown some mild improvement. Still she isn't processing her self-harm behaviors all that well.  has indicated that he thinks she is doing better.    When I met with the patient she was out in the milieu. She feels sad and would like to change treatment venues. She would be open to outpatient day programming or something similar. Still not able to really explain why she attempted to strangle herself multiple times or indicate that she won't do that now.      Psychiatric Symptoms: disorganization, insomnia, paranoia, confusion, self-harm    Medication side effects reported: none    Other issues reported by patient: none         Medications:       lurasidone  80 mg Oral Daily with lunch     QUEtiapine  200 mg Oral At Bedtime          Allergies:     Allergies   Allergen Reactions     Penicillins Unknown     Has been told she was allergic since she was a child.           Labs:     No results found for this or any previous visit (from the past 48 hour(s)).       Psychiatric Examination:     /81   Pulse 77   Temp 97.7  F (36.5  C)   Resp 16   Wt 63.6 kg (140 lb 3.2 oz)   SpO2 97%   BMI 24.07 kg/m    Weight is 140 lbs 3.2 oz  Body mass index is 24.07 kg/m .    Orthostatic Vitals       Most Recent      Sitting Orthostatic /79 04/02 0849    Sitting Orthostatic Pulse (bpm) 105 04/02 0849    Standing Orthostatic /75 04/03 0900    Standing Orthostatic Pulse (bpm) 89 04/03 0900            Appearance: awake, alert, casually dressed and untidy  Attitude:  cooperative  Eye Contact:  better  Mood:  anxious  Affect:  mood congruent  Speech:  paucity of speech  Language: fluent and intact in English  Psychomotor, Gait,  Musculoskeletal:  no evidence of tardive dyskinesia, dystonia, or tics and intact station, gait and muscle tone  Throught Process:  goal oriented  Associations:  no loose associations  Thought Content:  no evidence of suicidal ideation or homicidal ideation and paranoid  Insight:  limited  Judgement:  limited  Oriented to:  did not answer orientation questions  Attention Span and Concentration:  limited  Recent and Remote Memory:  fair  Fund of Knowledge:  appropriate    Clinical Global Impressions  First:  Considering your total clinical experience with this particular patient population, how severe are the patient's symptoms at this time?: 7 (03/25/19 1318)  Compared to the patient's condition at the START of treatment, this patient's condition is:: 4 (03/25/19 1318)  Most recent:  Considering your total clinical experience with this particular patient population, how severe are the patient's symptoms at this time?: 7 (04/02/19 1458)  Compared to the patient's condition at the START of treatment, this patient's condition is:: 4 (04/02/19 1458)             Precautions:     Behavioral Orders   Procedures     Code 1 - Restrict to Unit     Elopement precautions     Fall precautions     Routine Programming     As clinically indicated     Sexual precautions     15 minutes after admission patient hugging male patient.     Status 15     Every 15 minutes.     Status Individual Observation     Order Specific Question:   CONTINUOUS 24 hours / day     Answer:   5 feet     Order Specific Question:   Indications for SIO     Answer:   Suicide risk          Diagnoses:   1. Bipolar disorder, currently manic  2. Generalized anxiety disorder         Assessment & Plan:       Target psychiatric symptoms and interventions:  Xiomara  -Seroquel 200 mg at bedtime.  -lurasidone 80 mg with lunch  -continue PRN haloperidol for psychosis and xiomara  -continue PRN diphenhydramine for side effects related to haloperidol. May also be used for  anxiety or insomnia  -continue PRN lorazepam for anxiety and agitation (may also help with any akithisia haloperidol causes) may also use for insomnia if Seroquel and diphenhydramine are ineffective.    Medical Problems and Treatments:  None acute    Behavioral/Psychological/Social:  Encourage unit programming    Continue 1:1 staff at this time due to wrapping clothing around her neck and ongoing inability to adequately process this in order to feel more confident about safety.    Disposition Plan   Reason for ongoing admission: is unable to care for self due to severe psychosis or manuela  Discharge location: home with family  Discharge Medications: not ordered  Follow-up Appointments: not scheduled  Legal Status: voluntary  Entered by: Shon Hills on 4/3/2019 at 5:13 PM

## 2019-04-03 NOTE — PROGRESS NOTES
VM from pt's , Daniel. Would like to discus discharge planning. Is aware that pt is quite withdrawn with staff but more animated with family.     [facility] called back, [phone].    Call back from Daniel. Discussed pt process. Daniel had questions about pt's medication; [facility] referred him to the nurse for medication questions.

## 2019-04-04 PROCEDURE — 25000132 ZZH RX MED GY IP 250 OP 250 PS 637: Performed by: PSYCHIATRY & NEUROLOGY

## 2019-04-04 PROCEDURE — G0177 OPPS/PHP; TRAIN & EDUC SERV: HCPCS

## 2019-04-04 PROCEDURE — 99232 SBSQ HOSP IP/OBS MODERATE 35: CPT | Performed by: PSYCHIATRY & NEUROLOGY

## 2019-04-04 PROCEDURE — 12400001 ZZH R&B MH UMMC

## 2019-04-04 RX ADMIN — HYDROXYZINE HYDROCHLORIDE 50 MG: 25 TABLET ORAL at 20:54

## 2019-04-04 RX ADMIN — HYDROXYZINE HYDROCHLORIDE 50 MG: 25 TABLET ORAL at 16:45

## 2019-04-04 RX ADMIN — LURASIDONE HYDROCHLORIDE 80 MG: 80 TABLET, FILM COATED ORAL at 12:35

## 2019-04-04 RX ADMIN — TRAZODONE HYDROCHLORIDE 50 MG: 50 TABLET ORAL at 20:54

## 2019-04-04 RX ADMIN — QUETIAPINE FUMARATE 200 MG: 200 TABLET ORAL at 20:54

## 2019-04-04 ASSESSMENT — ACTIVITIES OF DAILY LIVING (ADL)
HYGIENE/GROOMING: INDEPENDENT
DRESS: INDEPENDENT
HYGIENE/GROOMING: INDEPENDENT
DRESS: INDEPENDENT
HYGIENE/GROOMING: INDEPENDENT
ORAL_HYGIENE: INDEPENDENT
ORAL_HYGIENE: INDEPENDENT
LAUNDRY: UNABLE TO COMPLETE
DRESS: INDEPENDENT
ORAL_HYGIENE: INDEPENDENT

## 2019-04-04 NOTE — PROGRESS NOTES
Pt is off sio now. She said she feels ready for this, and is not suicidal. She denies SIB thoughts/urges, as well. Pt agrees to come to nurse/staff, if thoughts/urges occur. She is in the milieu, and her sister is here visiting, now.

## 2019-04-04 NOTE — PROGRESS NOTES
Pt continues to deny si and sib thoughts/feelings. She is resting in her bed, with weighted blanket on. She agrees to let nurse/staff know, if she is having the above; and not to act on thoughts or urges.

## 2019-04-04 NOTE — PLAN OF CARE
Patient was able to answer questions slightly more readily this evening.  She denied SI without hesitation but endorsed depression and anxiety; agreed to take PRN Vistaril at HS.  Patient also spontaneously stated she felt better than before, but when asked to elaborate on that responded nonsensically.  She visibly struggles with word finding.  Patient denied physical problems and medication side effects.

## 2019-04-04 NOTE — PROGRESS NOTES
Pt was withdrawn and isolative most of the shift. Sister visited this afternoon which pt reported went well. Pt denies SI/SIB, and mental health symptoms, stating 'I just want to go home'. Pt reports her mood as 'good', has a flat affect. No concerns to be noted this shift.       04/04/19 1417   Behavioral Health   Hallucinations denies / not responding to hallucinations   Thinking poor concentration   Orientation person: oriented;place: oriented;date: oriented;time: oriented   Memory baseline memory   Insight poor   Judgement impaired   Eye Contact at examiner   Affect blunted, flat   Mood mood is calm;depressed   Physical Appearance/Attire attire appropriate to age and situation   Hygiene well groomed   Suicidality other (see comments)  (denies)   1. Wish to be Dead No   2. Non-Specific Active Suicidal Thoughts  No   Self Injury other (see comment)  (denies)   Elopement Statements about wanting to leave   Activity withdrawn;isolative   Speech clear;coherent   Psychomotor / Gait balanced;steady   Psycho Education   Type of Intervention 1:1 intervention   Response participates, initiates socially appropriate   Hours 0.5   Treatment Detail check in   Activities of Daily Living   Hygiene/Grooming independent   Oral Hygiene independent   Dress independent   Laundry unable to complete   Room Organization independent

## 2019-04-04 NOTE — PROGRESS NOTES
Patient remains of status individual observation with a 1:1 staff for indications of suicidal risks (see orders). Patient slept for 7 hours this shift. No complaints or concerns voiced by patient or noted by staff. Will continue to monitor and update if there are changes.

## 2019-04-04 NOTE — PLAN OF CARE
Initiated and actively participated in 1 of 2 OT groups offered with 1:1 staff present. Gonzalo mood and affect. Brightened when socially engaged with peers in structured setting. With reminders collected creative task and began work independently. Good attention to details and planning. Worked slowly/low energy. Able to follow 2 step verbal directions with good results. Guarded in conversation regarding goals and plans. Will continue to encourage self expression through creative outlets.

## 2019-04-04 NOTE — PROGRESS NOTES
[facility], [place]   Psychiatric Progress Note  Hospital Day: 11        Interim History:   The patient's care was discussed with the treatment team during the daily team meeting and/or staff's chart notes were reviewed.  Staff reports that the patient has been improving. She did attend group and participated a little at the end. She has been more verbal with staff. She has not demonstrated any self-harm or suicidal behaviors over several days per RN report.      When I met with the patient she was in room meeting with a visitor. Visitor stepped out for the meeting. The patient reports that she is not feeling suicidal. She has no plans or intent to harm herself. She has started to feel better. I indicate that I will stop the 1:1 if she believes that she can alert staff if she has impulse to harm herself. She believes that she can do that right now. I began to talk about some discharge goals and she appeared anxious. I assured her that we would not push her out and that our number one goal is a safe discharge with a plan that will be successful. She appeared relieved by this.    Psychiatric Symptoms: paranoia, anxiety    Medication side effects reported: none    Other issues reported by patient: none         Medications:       lurasidone  80 mg Oral Daily with lunch     QUEtiapine  200 mg Oral At Bedtime          Allergies:     Allergies   Allergen Reactions     Penicillins Unknown     Has been told she was allergic since she was a child.           Labs:     No results found for this or any previous visit (from the past 48 hour(s)).       Psychiatric Examination:     /81   Pulse 77   Temp 98.3  F (36.8  C) (Tympanic)   Resp 16   Wt 63.4 kg (139 lb 11.2 oz)   SpO2 97%   BMI 23.98 kg/m    Weight is 139 lbs 11.2 oz  Body mass index is 23.98 kg/m .    Orthostatic Vitals       Most Recent      Sitting Orthostatic BP 96/69 04/04 0830    Sitting Orthostatic Pulse (bpm) 90 04/04 0830     Standing Orthostatic /71 04/04 0830    Standing Orthostatic Pulse (bpm) 101 04/04 0830            Appearance: awake, alert, adequately groomed and casually dressed  Attitude:  cooperative  Eye Contact:  good  Mood:  anxious  Affect:  mood congruent and guarded  Speech:  paucity of speech  Language: fluent and intact in English  Psychomotor, Gait, Musculoskeletal:  no evidence of tardive dyskinesia, dystonia, or tics and intact station, gait and muscle tone  Throught Process:  goal oriented  Associations:  no loose associations  Thought Content:  no evidence of suicidal ideation or homicidal ideation and paranoid  Insight:  limited  Judgement:  limited  Oriented to:  did not answer orientation questions  Attention Span and Concentration:  limited  Recent and Remote Memory:  fair  Fund of Knowledge:  appropriate    Clinical Global Impressions  First:  Considering your total clinical experience with this particular patient population, how severe are the patient's symptoms at this time?: 7 (03/25/19 1318)  Compared to the patient's condition at the START of treatment, this patient's condition is:: 4 (03/25/19 1318)  Most recent:  Considering your total clinical experience with this particular patient population, how severe are the patient's symptoms at this time?: 7 (04/02/19 1458)  Compared to the patient's condition at the START of treatment, this patient's condition is:: 4 (04/02/19 1458)             Precautions:     Behavioral Orders   Procedures     Code 1 - Restrict to Unit     Elopement precautions     Fall precautions     Routine Programming     As clinically indicated     Sexual precautions     15 minutes after admission patient hugging male patient.     Status 15     Every 15 minutes.          Diagnoses:   1. Bipolar disorder, currently manic  2. Generalized anxiety disorder         Assessment & Plan:       Target psychiatric symptoms and interventions:  Xiomara  -Seroquel 200 mg at  bedtime.  -lurasidone 80 mg with lunch  -continue PRN haloperidol for psychosis and manuela  -continue PRN diphenhydramine for side effects related to haloperidol. May also be used for anxiety or insomnia  -continue PRN lorazepam for anxiety and agitation (may also help with any akithisia haloperidol causes) may also use for insomnia if Seroquel and diphenhydramine are ineffective.    Medical Problems and Treatments:  None acute    Behavioral/Psychological/Social:  Encourage unit programming    discontinue 1:1 at this time. Patient has not attempted any self-harm in several days and was able to verbalize currently feeling safe. She agreed to alert staff to any thoughts to harm self and believes that she can follow through with this plan.    Disposition Plan   Reason for ongoing admission: is unable to care for self due to severe psychosis or manuela  Discharge location: home with family  Discharge Medications: not ordered  Follow-up Appointments: not scheduled  Legal Status: voluntary  Entered by: Shon Hills on 4/4/2019 at 2:27 PM

## 2019-04-05 PROCEDURE — 12400001 ZZH R&B MH UMMC

## 2019-04-05 PROCEDURE — G0177 OPPS/PHP; TRAIN & EDUC SERV: HCPCS

## 2019-04-05 PROCEDURE — H2032 ACTIVITY THERAPY, PER 15 MIN: HCPCS

## 2019-04-05 PROCEDURE — 25000132 ZZH RX MED GY IP 250 OP 250 PS 637: Performed by: PSYCHIATRY & NEUROLOGY

## 2019-04-05 PROCEDURE — 90853 GROUP PSYCHOTHERAPY: CPT

## 2019-04-05 RX ADMIN — QUETIAPINE FUMARATE 200 MG: 200 TABLET ORAL at 20:05

## 2019-04-05 RX ADMIN — LURASIDONE HYDROCHLORIDE 80 MG: 80 TABLET, FILM COATED ORAL at 12:13

## 2019-04-05 RX ADMIN — TRAZODONE HYDROCHLORIDE 50 MG: 50 TABLET ORAL at 20:53

## 2019-04-05 ASSESSMENT — ACTIVITIES OF DAILY LIVING (ADL)
ORAL_HYGIENE: INDEPENDENT
DRESS: SCRUBS (BEHAVIORAL HEALTH)
DRESS: INDEPENDENT
LAUNDRY: WITH SUPERVISION
ORAL_HYGIENE: INDEPENDENT
HYGIENE/GROOMING: INDEPENDENT
HYGIENE/GROOMING: INDEPENDENT

## 2019-04-05 NOTE — PLAN OF CARE
Attended 2 of 2 OT groups offered with 1;1 staff present first group of the day.  Gonzalo affect and mood. Was briefly socially engaged upon approach. Retrieved task and supplies needed for task completion. Task focused x 35 minutes each session. Able to plan and problem solve simple task independently. Good attention to details. Accepting of positive feedback.

## 2019-04-05 NOTE — PROGRESS NOTES
Thank you for referring Fiorella Bartlett to Day Treatment (DT) .     The referral order included a request for DA completion on the inpatient unit, prior to discharge.     Upon initial screening including review of the EMR, consult with the CTC, and/or a brief meeting with the patient to discuss this referral, the patient meets criteria for the DA.     At this time, we have scheduled Fiorella Bartlett in our next available DA appointment on: Wednesday, 4/17 at 9:00am    We will coordinate with the CTC and/or patient should an earlier appointment become available.    Please contact me directly if you would like to discuss this referral further.    Genna Mcfarlane  4/5/2019  [phone]

## 2019-04-05 NOTE — PROGRESS NOTES
VM from pt's , Daniel. States that pt has outpatient appointments scheduled next week and wonders about rescheduling those. Also asking for an update.     [facility] called back, [phone]. The appointments are scheduled for Monday afternoon. Agreed that those should be rescheduled. Discussed pt progress. Daniel states that pt seemed even more upbeat last night.

## 2019-04-05 NOTE — PROGRESS NOTES
Pt a bit more focused & organized. She was able to do origami in OT somewhat succesfully. Pt is quite flat & her movements & responses are slow, but she responds appropriately.She appears sad but denies  depression or si/sib thoughts. Pt visible in milieu much of day. She was far less isolative this shift.     04/05/19 1300   Behavioral Health   Hallucinations denies / not responding to hallucinations   Thinking confused;distractable;poor concentration   Orientation person: oriented   Memory baseline memory   Insight poor;insight appropriate to situation   Judgement impaired   Eye Contact at examiner   Affect blunted, flat   Mood mood is calm   Physical Appearance/Attire attire appropriate to age and situation   Suicidality other (see comments)  (denies)   1. Wish to be Dead No   2. Non-Specific Active Suicidal Thoughts  No   Self Injury other (see comment)  (denies)   Activity other (see comment)  (attends groups,somewhat restless but more organized& focused)   Speech clear;coherent   Psycho Education   Type of Intervention 1:1 intervention   Response participates with encouragement   Hours 0.5   Treatment Detail check in   Activities of Daily Living   Hygiene/Grooming independent   Oral Hygiene independent   Dress scrubs (behavioral health)   Laundry with supervision   Room Organization independent

## 2019-04-05 NOTE — PLAN OF CARE
Patient denied suicidal ideation and stated she can maintain her safety on the unit.  Affect remains sad but eye contact is improved and speech is more fluent and less delayed.  She spends little time in milieu and paces jansen intermittently.  No behavioral redirection required and she does not appear to be confused or disoriented. Patient inquired if she could wear her own clothes.and was informed that she is still on elopement precautions.and encouraged to discuss this with provider tomorrow.  She received PRN Vistaril and Trazodone at .

## 2019-04-05 NOTE — PROGRESS NOTES
"Pt tentative and slow to engaged.  Brightened when describing her home as \"unique & cozy.\"  Increasing physical engagement coincided with less tense affect presentation.         04/05/19 1130   Dance Movement Therapy   Type of Intervention structured groups   Response participates with encouragement   Hours 1     "

## 2019-04-06 PROCEDURE — 25000132 ZZH RX MED GY IP 250 OP 250 PS 637: Performed by: PSYCHIATRY & NEUROLOGY

## 2019-04-06 PROCEDURE — 12400001 ZZH R&B MH UMMC

## 2019-04-06 RX ADMIN — HYDROXYZINE HYDROCHLORIDE 25 MG: 25 TABLET ORAL at 20:22

## 2019-04-06 RX ADMIN — QUETIAPINE FUMARATE 200 MG: 200 TABLET ORAL at 20:22

## 2019-04-06 RX ADMIN — Medication 25 MG: at 20:23

## 2019-04-06 RX ADMIN — LURASIDONE HYDROCHLORIDE 80 MG: 80 TABLET, FILM COATED ORAL at 11:37

## 2019-04-06 ASSESSMENT — ACTIVITIES OF DAILY LIVING (ADL)
ORAL_HYGIENE: INDEPENDENT
ORAL_HYGIENE: INDEPENDENT
DRESS: INDEPENDENT
DRESS: INDEPENDENT
HYGIENE/GROOMING: INDEPENDENT
DRESS: INDEPENDENT
HYGIENE/GROOMING: INDEPENDENT
HYGIENE/GROOMING: INDEPENDENT
ORAL_HYGIENE: INDEPENDENT

## 2019-04-06 NOTE — PLAN OF CARE
Patient appears more subdued and preoccupied this evening.  She continued to deny SI but has little to say when assessment is attempted.  Responses are delayed but logical and her affect is more somber.  She does not interact with peers and is spending more time in her room.  She would like elopement precautions dropped so she can wear her own clothes.  Patient denied physical problems and medication side effects and again received PRN Vistaril and Trazodone at .

## 2019-04-06 NOTE — PROGRESS NOTES
Pt denies SI/SIB. Pt was withdrawn. Pt was looking at a magazine ( with john) and was marking in it - she stated she has a small kitchen and would like a new kitchen. Pt  visited . Pt stated she is able to concentrate more than previous days.     04/06/19 1300   Behavioral Health   Hallucinations denies / not responding to hallucinations   Thinking poor concentration   Orientation person: oriented   Memory baseline memory   Insight poor   Judgement impaired   Eye Contact at examiner   Affect blunted, flat   Mood mood is calm   Suicidality other (see comments)  (pt denies)   Self Injury other (see comment)  (pt denies)   Activity withdrawn   Activities of Daily Living   Hygiene/Grooming independent   Oral Hygiene independent   Dress independent   Room Organization independent

## 2019-04-06 NOTE — PROGRESS NOTES
04/05/19 2000   Psycho Education   Type of Intervention structured groups   Response participates, initiates socially appropriate   Hours 1   Treatment Detail psychotherapy group   Patient participated in psychotherapy group which focused on personal resiliency by identifying individual strengths and positive coping skills.  Fiorella presented as dysthymic; however, she was actively engaged in the activity and discussion. She identified multiple strengths, coping skills, and supports. This writer taught, and then the group practiced a new breathing technique for relaxation (square breathing) which she reported to be helpful.

## 2019-04-07 PROCEDURE — H2032 ACTIVITY THERAPY, PER 15 MIN: HCPCS

## 2019-04-07 PROCEDURE — 12400001 ZZH R&B MH UMMC

## 2019-04-07 PROCEDURE — 25000132 ZZH RX MED GY IP 250 OP 250 PS 637: Performed by: PSYCHIATRY & NEUROLOGY

## 2019-04-07 RX ADMIN — Medication 25 MG: at 21:17

## 2019-04-07 RX ADMIN — QUETIAPINE FUMARATE 200 MG: 200 TABLET ORAL at 21:17

## 2019-04-07 RX ADMIN — LURASIDONE HYDROCHLORIDE 80 MG: 80 TABLET, FILM COATED ORAL at 12:02

## 2019-04-07 RX ADMIN — HYDROXYZINE HYDROCHLORIDE 50 MG: 25 TABLET ORAL at 21:17

## 2019-04-07 ASSESSMENT — ACTIVITIES OF DAILY LIVING (ADL)
HYGIENE/GROOMING: HANDWASHING;INDEPENDENT
DRESS: STREET CLOTHES;INDEPENDENT
LAUNDRY: WITH SUPERVISION
HYGIENE/GROOMING: INDEPENDENT
ORAL_HYGIENE: INDEPENDENT
DRESS: INDEPENDENT
ORAL_HYGIENE: INDEPENDENT

## 2019-04-07 NOTE — PROGRESS NOTES
Pt denies SI/SIB. Pt reports she is feeling better. Pt visited with family - pt was isolative to other pts. Pt was in and out of [facility].      04/06/19 3158   Behavioral Health   Hallucinations denies / not responding to hallucinations   Thinking poor concentration   Orientation person: oriented   Memory baseline memory   Insight poor   Judgement impaired   Eye Contact at examiner   Affect blunted, flat   Mood mood is calm   Suicidality other (see comments)  (pt denies)   Self Injury other (see comment)  (pt denies)   Activity other (see comment)  (family visited)   Activities of Daily Living   Hygiene/Grooming independent   Oral Hygiene independent   Dress independent   Room Organization independent

## 2019-04-07 NOTE — PLAN OF CARE
Patient was laughing and happy with visitors and after they left.  She denied suicidal ideation, depression, and anxiety.  Patient remains somewhat guarded and is not willing to discuss in depth reasons for improvement but appears more alert and less preoccupied.  She again received PRN Vistaril and Trazodone at .  Patient denied physical problems and medication side effects.

## 2019-04-07 NOTE — PROGRESS NOTES
"Patient and  asked \"what is the procedure for discharge and can she leave today?\"    Both agreed that meeting adriana HOROWITZ on Monday was the best plan. Patient did not request 12 hour intent to leave (was explained).    Patient is eating, visible, med-compliant and social with peers. No SI gestures observed.    Nursing will continue to monitor.  "

## 2019-04-07 NOTE — PROGRESS NOTES
Patient spent most of the day in her room, reading.  Ate meal trays.  Had visit from  this afternoon.  Denies any depression/anxiety at this time.  However, exhibits flat, blunted affect.  Quiet and generally isolative and reserved.  Oriented to date, time, and place.  Pleasant and cooperative.

## 2019-04-08 PROCEDURE — G0177 OPPS/PHP; TRAIN & EDUC SERV: HCPCS

## 2019-04-08 PROCEDURE — H2032 ACTIVITY THERAPY, PER 15 MIN: HCPCS

## 2019-04-08 PROCEDURE — 25000132 ZZH RX MED GY IP 250 OP 250 PS 637: Performed by: PSYCHIATRY & NEUROLOGY

## 2019-04-08 PROCEDURE — 99232 SBSQ HOSP IP/OBS MODERATE 35: CPT | Performed by: PSYCHIATRY & NEUROLOGY

## 2019-04-08 PROCEDURE — 12400001 ZZH R&B MH UMMC

## 2019-04-08 PROCEDURE — 90853 GROUP PSYCHOTHERAPY: CPT

## 2019-04-08 RX ORDER — QUETIAPINE FUMARATE 200 MG/1
200 TABLET, FILM COATED ORAL AT BEDTIME
Qty: 30 TABLET | Refills: 0 | Status: ON HOLD | OUTPATIENT
Start: 2019-04-08 | End: 2021-08-27

## 2019-04-08 RX ORDER — HYDROXYZINE HYDROCHLORIDE 25 MG/1
25-50 TABLET, FILM COATED ORAL 3 TIMES DAILY PRN
Qty: 120 TABLET | Refills: 0 | Status: ON HOLD | OUTPATIENT
Start: 2019-04-08 | End: 2021-08-21

## 2019-04-08 RX ORDER — LURASIDONE HYDROCHLORIDE 80 MG/1
80 TABLET, FILM COATED ORAL
Qty: 30 TABLET | Refills: 0 | Status: ON HOLD | OUTPATIENT
Start: 2019-04-09 | End: 2021-08-21

## 2019-04-08 RX ADMIN — HYDROXYZINE HYDROCHLORIDE 50 MG: 25 TABLET ORAL at 21:03

## 2019-04-08 RX ADMIN — Medication 25 MG: at 21:03

## 2019-04-08 RX ADMIN — QUETIAPINE FUMARATE 200 MG: 200 TABLET ORAL at 21:03

## 2019-04-08 RX ADMIN — LURASIDONE HYDROCHLORIDE 80 MG: 80 TABLET, FILM COATED ORAL at 13:16

## 2019-04-08 ASSESSMENT — ACTIVITIES OF DAILY LIVING (ADL)
ORAL_HYGIENE: INDEPENDENT
DRESS: STREET CLOTHES;INDEPENDENT
HYGIENE/GROOMING: INDEPENDENT
ORAL_HYGIENE: INDEPENDENT
HYGIENE/GROOMING: HANDWASHING;INDEPENDENT
LAUNDRY: WITH SUPERVISION
DRESS: SCRUBS (BEHAVIORAL HEALTH)

## 2019-04-08 NOTE — PROGRESS NOTES
Patient appeared blunted in affect. She nodded yes when asked if she was looking forward to discharge tomorrow. Patient denied thoughts of self-harm this shift. Patient did not really engage in conversation when this writer checked in with her.

## 2019-04-08 NOTE — PROGRESS NOTES
"   04/07/19 2100   General Information   Art Directive other (see comments)   AT directive was to create two images using lines, shapes and colors. The first image was to symbolize the mind state that led to hospitalization ( anxious, depressed etc) The second image was to symbolize the \"resting mind\" (calmness, peacefulness associated with being mindful. Goals: emotional expression, mindfulness. Pt was a positive participant, focused on task for the full duration of group. Pt verbally processed with group. Mood was calm.      "

## 2019-04-08 NOTE — PROGRESS NOTES
VM from pt's , Daniel. Would like to be home for a few days when pt discharges and so would like an update. States appears that pt seems to be doing better.      [facility] called back, [phone].  states one change in patient is that she talks about going home to do specific things rather than just talking about going home but with no plan.     CTC advised concerning pt progress. CTC advised pt will see the doctor today and can discuss discharge with him.     Provider saw pt; pt will discharge tomorrow. CTC called pt . He can pick pt up in the early afternoon, about 1 or 2. He would like to be present for the discharge instructions with the nurse; pt was a bit overwhelmed by this and confused when discharged last time.

## 2019-04-09 ENCOUNTER — BEH TREATMENT PLAN (OUTPATIENT)
Dept: BEHAVIORAL HEALTH | Facility: CLINIC | Age: 34
End: 2019-04-09
Attending: PSYCHIATRY & NEUROLOGY

## 2019-04-09 VITALS
HEART RATE: 92 BPM | BODY MASS INDEX: 24.44 KG/M2 | DIASTOLIC BLOOD PRESSURE: 89 MMHG | WEIGHT: 142.4 LBS | TEMPERATURE: 98 F | SYSTOLIC BLOOD PRESSURE: 111 MMHG | OXYGEN SATURATION: 97 % | RESPIRATION RATE: 17 BRPM

## 2019-04-09 DIAGNOSIS — F31.9 BIPOLAR 1 DISORDER (H): ICD-10-CM

## 2019-04-09 PROCEDURE — 25000132 ZZH RX MED GY IP 250 OP 250 PS 637: Performed by: PSYCHIATRY & NEUROLOGY

## 2019-04-09 PROCEDURE — 90791 PSYCH DIAGNOSTIC EVALUATION: CPT

## 2019-04-09 PROCEDURE — G0177 OPPS/PHP; TRAIN & EDUC SERV: HCPCS

## 2019-04-09 PROCEDURE — 99239 HOSP IP/OBS DSCHRG MGMT >30: CPT | Performed by: PSYCHIATRY & NEUROLOGY

## 2019-04-09 RX ADMIN — ACETAMINOPHEN 650 MG: 325 TABLET, FILM COATED ORAL at 07:50

## 2019-04-09 RX ADMIN — LURASIDONE HYDROCHLORIDE 80 MG: 80 TABLET, FILM COATED ORAL at 13:36

## 2019-04-09 ASSESSMENT — ACTIVITIES OF DAILY LIVING (ADL)
ORAL_HYGIENE: INDEPENDENT
LAUNDRY: WITH SUPERVISION
DRESS: STREET CLOTHES
HYGIENE/GROOMING: INDEPENDENT

## 2019-04-09 NOTE — PROGRESS NOTES
Acknowledgement of Current Treatment Plan       I have reviewed my treatment plan with my therapist / counselor on 4/9/2019. I agree with the plan as it is written in the electronic health record.    Name Signature   Fiorella Bartlett    Name of Therapist / Counselor    Dayo Monique MA [facility]

## 2019-04-09 NOTE — PROGRESS NOTES
"Adult Outpatient Mental Health Programs    MY COPING PLAN FOR SAFETY    NAME: Fiorella Bartlett  MRN: [id number]  SAFETY PLAN:  Step 1: Warning signs / cues (Thoughts, images, mood, situation, behavior) that a crisis may be developing:    Thoughts: \"I can't do this anymore\"    Images: flashbacks and work stress    Thinking Processes: racing thoughts    Mood: intense worry    Behaviors: not taking care of myself    Situations: work stress   Step 2: Coping strategies - Things I can do to take my mind off of my problems without contacting another person (relaxation technique, physical activity):    Distress Tolerance Strategies:  Exercise    Physical Activities: exercise: ,    Focus on helpful thoughts:  self-compassion statements: ,  Step 3: People and social settings that provide distraction:   Name: Therapist Phone:    Name: Daniel Karinasusangraciela Phone: [phone]     movie theater, pet store/humane society, coffee shop, park, library, gym  and school   Step 4: Remind myself of people and things that are important to me and worth living for:  Family, Friends  Step 5: When I am in crisis, I can ask these people to help me use my safety plan:   Name: Therapist Phone:    Name: Daniel Nicholson Phone: [phone]  Step 6: Making the environment safe:     \"Balance my stress\"  Step 7: Professionals or agencies I can contact during a crisis:    Suicide Prevention Lifeline: 0-538-680-TALK (5154)    Crisis Text Line Service (available 24 hours a day, 7 days a week): Text [place] to 181786    Call  **CRISIS (158886) from a cell phone to talk to a team of professionals who can help you.  Crisis Services By County: Phone Number:   [place]     [phone]   [place]    [phone]   [place]    [phone]   [place]    [phone]   [place]    [phone]   [place] [phone]   [place]     [phone]       Call 911 or go to my nearest emergency department.     I helped develop this safety plan and agree to use it when needed.  I " have been given a copy of this plan.      Client signature _________________________________________________________________  Today s date:  4/9/2019  Adapted from Safety Plan Template 2008 Wendi Clifford and Michael Tong is reprinted with the express permission of the authors.  No portion of the Safety Plan Template may be reproduced without the express, written permission.  You can contact the authors at [email or web address] or [email or web address].

## 2019-04-09 NOTE — PROGRESS NOTES
Initial Individual Treatment Plan     Patient: Fiorella Bartlett   MRN: [id number]  : 1985  Age: 33 year old  Sex: female    Diagnostic Assessment Date / Date of Initial Individual Treatment Plan: 19      Immediate Health Concerns:  No     Immediate Safety Concerns:  No. Per history, see safety plan.    Identify the issues to be addressed in treatment:  Symptom Management, Personal Safety, Community Resources/Discharge Planning and Develop Socialization / Interpersonal Relationship Skills    Client Initial Individualized Goals for Treatment: Stress management    Initial Treatment suggestions for the client during the time between Diagnostic Assessment and completion of the Individualized Treatment Plan:  Follow Safety Plan   Ask for more information, support and/or assistance as needed.  Follow up with providers/community supports as needed:   Report increases or changes in symptoms to staff.  Report any personal safety concerns to staff.   Take medications as prescribed.  Report medication changes and/or side effects to staff.  Attend and participate in groups as scheduled or notify staff if unable to do so.  Report any use of substances to staff as this may impact your symptoms and/or  personal safety.  Notify staff if you have any other issues that need to be addressed. This may include  any current abuse / neglect / exploitation or other vulnerability.  Follow recommendations of your treatment team and discuss concerns if not in  agreement.     Treatment Team Responsible: Day Treatment (DT)      Therapeutic Interventions/Treatment Strategies may include:  Support, Redirection, Feedback, Limit/Boundaries, Safety Assessments, Structured Activity, Problem Solving, Clarification, Education, Motivational Enhancement and Relapse Prevention as needed.    Arvind Monique MA [facility]

## 2019-04-09 NOTE — PROGRESS NOTES
"   04/08/19 2000   Psycho Education   Type of Intervention structured groups   Response participates, initiates socially appropriate   Hours 1   Focus of group was to identify and share current feelings through an activity called, \"feelings heart.\"  Fiorella thoughtfully participated in the activity and openly shared her feelings with the group. She became tearful when she shared her saddness about the loss of her wedding ring as she was transferred [facility] ED. She reports feeling calm after the visit from her  this evening and looks forward to going home. Fiorella learned and participated in a mindfulness activity noticing 5 senses which she found helpful.  "

## 2019-04-09 NOTE — PROGRESS NOTES
VM from pt's , Daniel. States has 2 questions: If pt begins declining should he contact someone on this station.     If he does notice worsening of symptoms should they go to the ED or some intermediate steps.     [facility] called back, [phone]. Left message.

## 2019-04-09 NOTE — DISCHARGE INSTRUCTIONS
Behavioral Discharge Planning and Instructions      Summary:  You were admitted on 3/24/2019  due to Psychotic Symptomology.  You were treated by Dr. Shon Hills MD and discharged on 4/9/2019 from [facility] to Home      Principal Diagnosis:   1. Bipolar disorder, currently manic  2. Generalized anxiety disorder    Health Care Follow-up Appointments:   Psychiatry Appointment: Monday 8/15 at 8:40 am  Psychiatrist: Dr. Roxy Yi DO   Location: [facility]   [address] [place] [address]  Phone: [phone]  Fax: [phone]    Therapy: You will schedule this; your  has left a message requesting an appointment.   Therapist: Betsy abdullahi Medabil   [address], [place] [address]  Phone:  [phone]  email: [email or web address]    You have been referred to the [facility] Day Treatment Program.  You had an intake on Tuesday 4/9 at 9 am while you were on the unit.   Recommendation: 5A group, Mon, Tues, Thursday from 9-12. Starting Monday 4/15. (the department is taking a training day on Tuesday 4/16 so there will be no programming that day. There will be programming on Thursday so next week you will attend just 2 days).   Located in [facility] Floor [address] ; [address] [place] [address] Phone:[phone]  A Medication Therapy Management Pharmacist will contact you by phone after your discharge. You have identified Thursday 4/11 at 10:30 am as the best time for this call and your phone number as [phone].  Attend all scheduled appointments with your outpatient providers. Call at least 24 hours in advance if you need to reschedule an appointment to ensure continued access to your outpatient providers.   Major Treatments, Procedures and Findings:  You were provided with: a psychiatric assessment, assessed for medical stability, medication evaluation and/or management, group therapy and milieu  "management    Symptoms to Report: feeling more aggressive, increased confusion, losing more sleep, mood getting worse or thoughts of suicide    Early warning signs can include: increased depression or anxiety sleep disturbances increased thoughts or behaviors of suicide or self-harm  increased unusual thinking, such as paranoia or hearing voices    Safety and Wellness:  Take all medicines as directed.  Make no changes unless your doctor suggests them.      Follow treatment recommendations.  Refrain from alcohol and non-prescribed drugs.  If there is a concern for safety, call 911.    Resources:   MILI:737.916.7276  Anais Lilli: [phone]     National [place] on Mental Illness (www.[place].megan.org): 697.416.2633 or 932-199-1339.  Suicide Awareness Voices of Education (SAVE) (www.save.org): 943-512-VDTN (4583)  National Suicide Prevention Line (www.mentalhealthmn.org): 671-439-BUCQ (0898)  Mental Health Consumer/Survivor Network of [place] (www.mhcsn.net): 972.597.4488 or 787-398-6666  Mental Health Association of [place] (www.mentalhealth.org): 902.589.7477 or 569-316-1159  Self- Management and Recovery Training., SMART-- Toll free: 719.584.8806  www.[employer]  Text 4 Life: txt \"LIFE\" to 68170 for immediate support and crisis intervention  Crisis text line: Text \"[place]\" to 660581. Free, confidential, 24/7.      The treatment team has appreciated the opportunity to work with you.     If you have any questions or concerns our unit number is [phone]  You may be receiving a follow-up phone call within the next three days from a representative from behavioral health.          "

## 2019-04-09 NOTE — PROGRESS NOTES
RN 48 hour assessment:  Patient was discharged to home from [facility] at approximately 1330. Discharge instructions were thoroughly reviewed with patient and her . Patient was going to  her medications at the discharge pharmacy after she left the unit. Patient denied having suicidal thoughts at time of discharge. Patient's personal belongings were returned to her at discharge.

## 2019-04-09 NOTE — PROGRESS NOTES
Patient spent most of the evening in lounge among peers.  Had visit from her  tonight.  Attended Music Therapy.  Looks forward to discharge tomorrow.  Pleasant and cooperative.

## 2019-04-09 NOTE — PROGRESS NOTES
[facility], [place]   Psychiatric Progress Note  Hospital Day: 15        Interim History:   The patient's care was discussed with the treatment team during the daily team meeting and/or staff's chart notes were reviewed.  Staff reports that the patient has continued to be free from self-harm or suicidal gestures. She is wanting to leave. She is quite blunted.      When I met with the patient she indicates that she ready to leave. She feels as though she can get better faster outside of the hospital. She denies suicidal thinking and plans to follow through with day treatment as recommended. She is frustrated that I recommended she stay tonight so that we can coordinate her discharge and make sure everything is in place, but agrees.    Psychiatric Symptoms: paranoia improving. Appears somewhat depressed.    Medication side effects reported: none    Other issues reported by patient: none         Medications:       lurasidone  80 mg Oral Daily with lunch     QUEtiapine  200 mg Oral At Bedtime          Allergies:     Allergies   Allergen Reactions     Penicillins Unknown     Has been told she was allergic since she was a child.           Labs:     No results found for this or any previous visit (from the past 48 hour(s)).       Psychiatric Examination:     /89   Pulse 92   Temp 98.7  F (37.1  C) (Tympanic)   Resp 16   Wt 63.9 kg (140 lb 14.4 oz)   SpO2 97%   BMI 24.19 kg/m    Weight is 140 lbs 14.4 oz  Body mass index is 24.19 kg/m .    Orthostatic Vitals       Most Recent      Sitting Orthostatic /84 04/08 1617    Sitting Orthostatic Pulse (bpm) 82 04/08 1617    Standing Orthostatic /87 04/08 0851    Standing Orthostatic Pulse (bpm) 89 04/08 0851            Appearance: awake, alert, adequately groomed and casually dressed  Attitude:  cooperative  Eye Contact:  good  Mood:  sad   Affect:  mood congruent and guarded  Speech:  paucity of speech  Language: fluent and  intact in English  Psychomotor, Gait, Musculoskeletal:  no evidence of tardive dyskinesia, dystonia, or tics and intact station, gait and muscle tone  Throught Process:  goal oriented  Associations:  no loose associations  Thought Content:  no evidence of suicidal ideation or homicidal ideation, no auditory hallucinations present and no visual hallucinations present  Insight:  fair  Judgement:  fair  Oriented to:  time, person, and place  Attention Span and Concentration:  intact  Recent and Remote Memory:  intact  Fund of Knowledge:  appropriate    Clinical Global Impressions  First:  Considering your total clinical experience with this particular patient population, how severe are the patient's symptoms at this time?: 7 (03/25/19 1318)  Compared to the patient's condition at the START of treatment, this patient's condition is:: 4 (03/25/19 1318)  Most recent:  Considering your total clinical experience with this particular patient population, how severe are the patient's symptoms at this time?: 7 (04/02/19 1458)  Compared to the patient's condition at the START of treatment, this patient's condition is:: 4 (04/02/19 1458)             Precautions:     Behavioral Orders   Procedures     Code 1 - Restrict to Unit     Elopement precautions     Fall precautions     Routine Programming     As clinically indicated     Sexual precautions     15 minutes after admission patient hugging male patient.     Status 15     Every 15 minutes.          Diagnoses:   1. Bipolar disorder, currently manic  2. Generalized anxiety disorder         Assessment & Plan:       Target psychiatric symptoms and interventions:  Xiomara  -Seroquel 200 mg at bedtime.  -lurasidone 80 mg with lunch  -continue PRN haloperidol for psychosis and xiomara  -continue PRN diphenhydramine for side effects related to haloperidol. May also be used for anxiety or insomnia  -continue PRN lorazepam for anxiety and agitation (may also help with any akithisia  haloperidol causes) may also use for insomnia if Seroquel and diphenhydramine are ineffective.    Medical Problems and Treatments:  None acute    Behavioral/Psychological/Social:  Encourage unit programming      Disposition Plan   Reason for ongoing admission: will have day treatment intake tomorrow AM prior to discharge.  Discharge location: home with family  Discharge Medications: ordered.  Follow-up Appointments: not scheduled  Legal Status: voluntary  Entered by: Shon Hills on 4/8/2019 at 7:50 PM

## 2019-04-09 NOTE — DISCHARGE SUMMARY
Psychiatric Discharge Summary    Fiorella Bartlett MRN# [id number]   Age: 33 year old YOB: 1985     Date of Admission:  3/24/2019  Date of Discharge:  4/9/2019  Admitting Physician:  Shon Hills MD  Discharge Physician:  Shon Hills MD          Event Leading to Hospitalization:   33-year-old woman admitted with symptoms of manuela and psychosis shortly following a recent hospitalization.       See Admission note by Hugo Harris MD on 3/24/19 for additional details.          Diagnoses:     Bipolar disorder versus schizoaffective disorder bipolar type  Generalized anxiety disorder           Labs:     Results for orders placed or performed during the hospital encounter of 03/24/19   HCG qualitative Blood   Result Value Ref Range    HCG Qualitative Serum Negative NEG^Negative              Consults:   No consultations were requested during this admission         Hospital Course:   Fiorella Bartlett was admitted to [facility] with attending Shon Hills MD on a 72 hour mental health hold, but patient subsequently signed in voluntarily. The patient was placed under status 15 (15 minute checks) to ensure patient safety.     Patient initially was extremely manic and disorganized. She grabbed the genital area of a male peer early in her stay and was placed on a 1:1. She had several days of disorganization and paranoia. She would randomly throw herself on the floor. After a few days this too improved and her 1:1 was stopped. She started to show some odd behaviors and 1:1 was started again. Shortly after this, she tried to tie something around her neck. This happened several times over the span of a few days. Eventually we were able to stop the 1:1 again. The patient's disorganization improved significantly . She was much less manic. At the time of discharge she still appeared somewhat paranoid and somewhat depressed. She completed her intake for day treatment the morning of  discharge.    Patient was compliant with treatment throughout her stay. Due to manic symptoms on admission, it was advised she avoid antidepressant medications and use mood stabilizers and antipsychotics for treatment now. Should she continue to have poor depression control, she should only consider restarting antidepressant under close supervision of a psychiatrist while on an adequate mood stabilizer.    I met with patient and  at the time of discharge to discuss medications. Patient denied any thoughts to harm herself and planned to continue with recommended treatment.    Fiorella Bartlett was released to home. At the time of discharge Fiorella Bartlett was determined to not be a danger to herself or others.          Discharge Medications:     Current Discharge Medication List      START taking these medications    Details   lurasidone (LATUDA) 80 MG TABS tablet Take 1 tablet (80 mg) by mouth daily (with lunch)  Qty: 30 tablet, Refills: 0    Associated Diagnoses: Schizoaffective disorder, bipolar type (H)         CONTINUE these medications which have CHANGED    Details   hydrOXYzine (ATARAX) 25 MG tablet Take 1-2 tablets (25-50 mg) by mouth 3 times daily as needed for other (adjuvant pain)  Qty: 120 tablet, Refills: 0    Associated Diagnoses: Schizoaffective disorder, bipolar type (H)      QUEtiapine (SEROQUEL) 200 MG tablet Take 1 tablet (200 mg) by mouth At Bedtime  Qty: 30 tablet, Refills: 0    Associated Diagnoses: Schizoaffective disorder, bipolar type (H)         STOP taking these medications       escitalopram (LEXAPRO) 20 MG tablet Comments:   Reason for Stopping:         traZODone (DESYREL) 50 MG tablet Comments:   Reason for Stopping:                    Psychiatric Examination:   Appearance:  awake, alert, adequately groomed and casually dressed  Attitude:  cooperative  Eye Contact:  good  Mood:  sad   Affect:  intensity is blunted  Speech:  clear, coherent and normal prosody  Psychomotor Behavior:   no evidence of tardive dyskinesia, dystonia, or tics  Thought Process:  goal oriented  Associations:  no loose associations  Thought Content:  no evidence of suicidal ideation or homicidal ideation and no evidence of psychotic thought  Insight:  fair  Judgment:  fair  Oriented to:  time, person, and place  Attention Span and Concentration:  intact  Recent and Remote Memory:  intact other than events shortly before and after admission  Language: Able to name objects, Able to repeat phrases and Able to read and write  Fund of Knowledge: appropriate  Muscle Strength and Tone: normal  Gait and Station: Normal         Discharge Plan:   Psychiatric Appointments: Dr. Roxy Walter DO at [facility] on 4/15/19 (patient may need to reschedule due to day treatment.  Patient to start day treatment on 4/15/19. Maritza Culp Thur from 9-12.    Attestation:  The patient has been seen and evaluated by me,  Shon Hills MD  On the day of discharge, I saw the patient and performed the above examination, reviewed discharge medications, reviewed follow-up plan, and assessed safety for discharge. I spent greater than 30 minutes on these tasks.

## 2019-04-10 NOTE — PLAN OF CARE
Attended 2 of 3 OT groups offered. Gonzalo mood and affect. Initiated OT Clinic late due to a consult. Minimal superficial social interaction. Initiated and actively participated in Bobby Chi/Mindfulness exercises. Attentive and engaged the entire session. Noted benefit but, not specific. When asked about discharge she winced-unwilling to explain facial expression.

## 2019-04-11 ENCOUNTER — ALLIED HEALTH/NURSE VISIT (OUTPATIENT)
Dept: PHARMACY | Facility: CLINIC | Age: 34
End: 2019-04-11
Payer: COMMERCIAL

## 2019-04-11 DIAGNOSIS — E55.9 VITAMIN D DEFICIENCY: ICD-10-CM

## 2019-04-11 DIAGNOSIS — F99 MENTAL HEALTH DISORDER: Primary | ICD-10-CM

## 2019-04-11 PROCEDURE — 99605 MTMS BY PHARM NP 15 MIN: CPT | Performed by: PHARMACIST

## 2019-04-11 PROCEDURE — 99607 MTMS BY PHARM ADDL 15 MIN: CPT | Performed by: PHARMACIST

## 2019-04-11 NOTE — PATIENT INSTRUCTIONS
Recommendations from today's MTM visit:                                                    MTM (medication therapy management) is a service provided by a clinical pharmacist designed to help you get the most of out of your medicines.   Today we reviewed what your medicines are for, how to know if they are working, that your medicines are safe and how to make your medicine regimen as easy as possible.     1. Call your day treatment program to see if you are able to arrive late after going to your psychiatrist appointment on Monday.    2. When you speak with psychiatry, ask them whether they suspect your antsiness or agitation is from your medications or underlying condition. All of your mood medications cause drowsiness so it would be unlikely for them to cause agitation- it's possible that you are agitated once your Seroquel wears off completely.    Next MTM visit: I will call again at 11am on 4/25 to see if you have further questions    To schedule another MTM appointment, please call the clinic directly or you may call the MTM scheduling line at [phone] or toll-free at [phone].     My Clinical Pharmacist's contact information:                                                      It was a pleasure talking with you today!  Please feel free to contact me with any questions or concerns you have.      Veda Cotter, [place], [facility]  Medication Therapy Management Provider  Phone: [phone]  [email or web address]    You may receive a survey about the MTM services you received by email and/or US Mail.  I would appreciate your feedback to help me serve you better in the future. Your comments will be anonymous.

## 2019-04-11 NOTE — PROGRESS NOTES
"SUBJECTIVE/OBJECTIVE:                Fiorella Bartlett is a 33 year old female called for a transitions of care visit.  She was discharged from [facility] on 04/09/19 for manuela/paranoid psychoisis.     Chief Complaint: Pt is having consistent \"antsiness\" in the afternoon.    Allergies/ADRs: Reviewed in Epic  Tobacco: No tobacco use   Alcohol: not currently using- maybe once a month historically  Caffeine: less than daily  Activity: plans to increase now that she is out of the hospital  PMH: Reviewed in Epic    Medication Adherence/Access:  Patient takes medications directly from bottles.  Patient takes medications 2 time(s) per day.   Per patient, misses medication 0 times per week.     Bipolar with Paranoia and Anxiety: Pt is followed by Dr. Yi at [facility] psychiatry and has an appointment at 8:40am on Monday. Pt takes Latuda 80mg at Noon and Seroquel 200mg before bedtime, hydroxyzine as needed. She is also using a light box. She feels antsy after taking Latuda (around 1pm) and is having some headaches. When she takes hydroxyzine 25mg to try to help with this, it just makes her fall asleep but she wakes up antsy again- not anxious but just restless. She reports her sleep has been okay. 8:40 on Monday.  She is wondering whether her PMDD could be playing a part as well.     Pt is not having constipation issues, dry eye or dry mouth. She reports she is not having ongoing manuela or thoughts of paranoia that she is aware of.    Vitamin D Deficiency: Pt takes Vitamin D 5000 international unit(s) 1-2 daily. Has been taking for years, was on high dose for deficiency in the past.  Vitamin D Deficiency Screening Results:  Lab Results   Component Value Date    VITDT 34 02/04/2019     Today's Vitals: There were no vitals taken for this visit.     Last Comprehensive Metabolic Panel:  Sodium   Date Value Ref Range Status   02/04/2019 140 133 - 144 mmol/L Final     Potassium   Date Value Ref Range Status   02/04/2019 4.2 3.4 " - 5.3 mmol/L Final     Chloride   Date Value Ref Range Status   02/04/2019 107 94 - 109 mmol/L Final     Carbon Dioxide   Date Value Ref Range Status   02/04/2019 27 20 - 32 mmol/L Final     Anion Gap   Date Value Ref Range Status   02/04/2019 6 3 - 14 mmol/L Final     Glucose   Date Value Ref Range Status   02/04/2019 95 70 - 99 mg/dL Final     Urea Nitrogen   Date Value Ref Range Status   02/04/2019 12 7 - 30 mg/dL Final     Creatinine   Date Value Ref Range Status   02/04/2019 0.80 0.52 - 1.04 mg/dL Final     GFR Estimate   Date Value Ref Range Status   02/04/2019 >90 >60 mL/min/[1.73_m2] Final     Comment:     Non  GFR Calc  Starting 12/18/2018, serum creatinine based estimated GFR (eGFR) will be   calculated using the Chronic Kidney Disease Epidemiology Collaboration   (CKD-EPI) equation.       Calcium   Date Value Ref Range Status   02/04/2019 8.6 8.5 - 10.1 mg/dL Final     ASSESSMENT:                 Current medications were reviewed today.      Medication Adherence: good, no issues identified    Bipolar with Paranoia and Anxiety: Needs improvement.  Current therapy is appropriate, however, patient is having possible side effects or possible undercurrent of anxiety, PMDD and/or manuela.  Patient would benefit from follow-up with psychiatry as scheduled to help her determine the source of the agitation specifically, as her meds are CNS depressants so I'm suspecting she gets antsy when her Seroquel wears off completely.  We discussed that headaches can be common with these medications and that hopefully they will subside as she continues to take them.    Vitamin D Deficiency: Stable.  Vitamin D level within normal limits, so current high-dose therapy is likely appropriate long-term.    PLAN:                  Post Discharge Medication Reconciliation Status: discharge medications reconciled, continue medications without change.    1. Pt to consult with psychaitry about antsiness/agitation.    I  spent 30 minutes with this patient today. I offer these suggestions with the understanding that I don't fully understand Fiorella's past medical history and the complexity of her health conditions. Fiorella should make no changes without the approval of her physician. A copy of the visit note was provided to the patient's primary care provider.    Will follow up in 2 weeks.    The patient was mailed a summary of these recommendations as an after visit summary.    Chart documentation was completed in part with Dragon voice-recognition software. Even though reviewed, some grammatical, spelling, and word errors may remain.    Veda Cotter, RebeccaD, [facility]  Medication Therapy Management Provider  Phone: [phone]  [email or web address]

## 2019-04-15 ENCOUNTER — HOSPITAL ENCOUNTER (OUTPATIENT)
Dept: BEHAVIORAL HEALTH | Facility: CLINIC | Age: 34
End: 2019-04-15
Attending: PSYCHIATRY & NEUROLOGY
Payer: COMMERCIAL

## 2019-04-15 PROBLEM — F31.9 BIPOLAR 1 DISORDER (H): Status: ACTIVE | Noted: 2019-04-15

## 2019-04-15 PROCEDURE — H2012 BEHAV HLTH DAY TREAT, PER HR: HCPCS

## 2019-04-15 ASSESSMENT — ANXIETY QUESTIONNAIRES
5. BEING SO RESTLESS THAT IT IS HARD TO SIT STILL: NEARLY EVERY DAY
7. FEELING AFRAID AS IF SOMETHING AWFUL MIGHT HAPPEN: NEARLY EVERY DAY
GAD7 TOTAL SCORE: 11
6. BECOMING EASILY ANNOYED OR IRRITABLE: NOT AT ALL
2. NOT BEING ABLE TO STOP OR CONTROL WORRYING: NOT AT ALL
3. WORRYING TOO MUCH ABOUT DIFFERENT THINGS: NOT AT ALL
1. FEELING NERVOUS, ANXIOUS, OR ON EDGE: MORE THAN HALF THE DAYS

## 2019-04-15 ASSESSMENT — PATIENT HEALTH QUESTIONNAIRE - PHQ9
SUM OF ALL RESPONSES TO PHQ QUESTIONS 1-9: 15
5. POOR APPETITE OR OVEREATING: NEARLY EVERY DAY

## 2019-04-15 NOTE — PROGRESS NOTES
"Adult Mental Health Outpatient Group Therapy Progress Note     Client Initial Individualized Goals for Treatment: \"Stress Management\"    See Initial Treatment suggestions for the client during the time between Diagnostic Assessment and completion of the Master Individualized Treatment Plan.    Treatment Goals:     To be determined.     Area of Treatment Focus:  Symptom Management, Personal Safety and Community Resources/Discharge Planning    Therapeutic Interventions/Treatment Strategies:  Support, Redirection, Safety Assessments, Education and Cognitive Behavioral Therapy    Response to Treatment Strategies:  Listened, Focused on Goals and Attentive     Name of Group: Group Psychotherapy I and Group Psychotherapy II  Date/Time: 4/15/19 / 0900 to 0950 and 1100 to 1150    Number of Group Participants: 7     Description and Outcome:  Writer welcomed and oriented client to groups.  Writer reviewed confidentiality, limitations of confidentiality, and group guidelines.  Fiorella shared that she was recently diagnosed with Bipolar illness after two hospitalizations this winter.  Client denied suicidal ideation, intent and plan. She shared that she has support from her , friends, and family.  She stated that she is on medical leave until the end of May.      Client demonstrated understanding of session content by introducing herself, and sharing current symptoms and stressors.      Is this a Weekly Review of the Progress on the Treatment Plan?  No  "

## 2019-04-16 ASSESSMENT — ANXIETY QUESTIONNAIRES: GAD7 TOTAL SCORE: 11

## 2019-04-18 ENCOUNTER — HOSPITAL ENCOUNTER (OUTPATIENT)
Dept: BEHAVIORAL HEALTH | Facility: CLINIC | Age: 34
End: 2019-04-18
Attending: PSYCHIATRY & NEUROLOGY
Payer: COMMERCIAL

## 2019-04-18 PROCEDURE — H2012 BEHAV HLTH DAY TREAT, PER HR: HCPCS

## 2019-04-18 NOTE — PROGRESS NOTES
"Adult Mental Health Outpatient Group Therapy Progress Note     Client Initial Individualized Goals for Treatment: \"Stress Management\"    See Initial Treatment suggestions for the client during the time between Diagnostic Assessment and completion of the Master Individualized Treatment Plan.    Treatment Goals:     To be determined.     Area of Treatment Focus:  Symptom Management, Personal Safety and Community Resources/Discharge Planning    Therapeutic Interventions/Treatment Strategies:  Support, Redirection, Safety Assessments, Education and Cognitive Behavioral Therapy    Response to Treatment Strategies:  Listened, Focused on Goals and Attentive     Name of Group: Group Psychotherapy I and Group Psychotherapy II  Date/Time: 4/18/19 / 0900 to 0950 and 1000 to 1050    Number of Group Participants: 8     Description and Outcome:  Fiorella shared the recent diagnosis with Bipolar illness.  She stated that she has had three episodes with disorganized thinking when experiencing a high level of stress.  She stated she feels conflicted about giving up leadership opportunities as she enjoys working in challenging environment, but she also does not want to have high symptoms again.  She also shared that she is having difficulty accepting need for ongoing medications due to some side effects.  Writer encouraged Fiorella to talk with her psychiatry provider about estimated length of treatment with medications and her concerns about side effects.  Fiorella stated that she would prefer to go off the medications when stable and use nutrition and supplements instead.    Group Therapy II: Client participated in a discussion about mental health relapse prevention.  Group discussed skills to practice.   Group also discussed ways to maintain hope during high symptom times.      Client demonstrated understanding of session content by introducing herself, and sharing current symptoms and stressors.      Is this a Weekly Review of the " Progress on the Treatment Plan?  Yes.      Are Treatment Plan Goals being addressed?  Yes, continue treatment goals      Are Treatment Plan Strategies to Address Goals Effective?  Yes, continue treatment strategies      Are there any current contracts in place?  No

## 2019-04-18 NOTE — PROGRESS NOTES
"  Adult Mental Health Outpatient Group Therapy Progress Note     Client Initial Individualized Goals for Treatment: \"Stress Management\"     See Initial Treatment suggestions for the client during the time between Diagnostic Assessment and completion of the Master Individualized Treatment Plan.     Treatment Goals:      To be determined.       Area of Treatment Focus:  Symptom Management    Therapeutic Interventions/Treatment Strategies:  Support, Feedback, Structured Activity, Clarification and Education    Response to Treatment Strategies:  Accepted Feedback, Gave Feedback, Listened, Focused on Goals, Attentive, Accepted Support and Alert    Name of Group:  Mental health management [id number], 8 participants     Description and Outcome:  Information was presented regarding relationship between body, mind and emotion.  Discussion centered around the importance of \"noticing\" the body in being present and in the body's ability to \"inform\".  The group was then given a structure in which to begin to notice breathe in a nonjudgemental way and then expanded the movement into space.  Group themes and discussion developed into breathe in transitions, the breathe and self care and the experience of being in one's space.  Client demonstrated understanding of session by engaging in experiential and sharing observations with the group. Fiorella shared her observation of physical release accompanying expansion.    Is this a Weekly Review of the Progress on the Treatment Plan?  No        "

## 2019-04-22 ENCOUNTER — HOSPITAL ENCOUNTER (OUTPATIENT)
Dept: BEHAVIORAL HEALTH | Facility: CLINIC | Age: 34
End: 2019-04-22
Attending: PSYCHIATRY & NEUROLOGY
Payer: COMMERCIAL

## 2019-04-22 PROCEDURE — H2012 BEHAV HLTH DAY TREAT, PER HR: HCPCS

## 2019-04-22 NOTE — PROGRESS NOTES
Adult Day Treatment Program:  Individualized Treatment Plan     Date of Plan: 19    Name: Fiorella Bartlett MRN: [id number]    : 1985    Programs:  Day Treatment (DT)     Clinical Track (if applicable):  5A    DSM5 Diagnosis  296.52 Bipolar I Disorder Current or Most Recent Episode Depressed, Moderate  300.02 (F41.1) Generalized Anxiety Disorder    Adult Day Treatment Program Multidisciplinary Team Members: Juno Hernandez MD and/or Radha Morin NP; Bri Espinal [facility], Trina Ann RN,  Mikhail Mckeon, OTR/L, Bri Burris [facility], BC-DMT    Fiorella Bartlett will participate in the Adult Day Treatment Program  3 days per week, 3 hours per day. Anticipated duration/discharge: 12 weeks    Program Start Date: 4/15/19  Anticipated Discharge Date: 19 (pending authorization/clinical changes)    NOTE: Complete CGI     Review Date: Does Fiorella Bartlett continue to meet criteria to participate in the Adult Day Treatment Program, 3 days per week; 3 hours per day?                           Client Strengths:   creative, kind, funny     Client Participation in Plan:  Contributed to goals and plan     Areas of Vulnerability:  Suicidal Ideation  Depressive symptoms  Trauma/Abuse/Neglect.     Long-Term Goals:  Knowledge about illness and management of symptoms   Maintenance of personal safety     Abuse Prevention Plan:  Safe, therapeutic environment   Safety coping plan as needed   Education regarding illness and skill development   Coordination with care providers     Discharge Criteria:  Satisfactory progress toward treatment goals   Improvement re: identified problems and symptoms   Ability to continue recovery at next level of service   Has a discharge plan in place   Has safety/coping plan in place      Areas of Treatment Focus        Area of Treatment Focus:   Personal Safety  Start Date:    19    Goal:  Target Date: 19 Status: Active  Client will notify staff when needing assistance to develop  or implement a coping plan to manage suicidal or self injurious urges.  Client will use coping plan for safety, as needed.      Progress:           Treatment Strategies:   Assess / reassess level of potential for harm to self or others  Engage in safety planning when indicated  Facilitate increased self awareness          Area of Treatment Focus:   Symptom Stabilization and Management  Start Date:    4/25/19    Goal:  Target Date: 6/20/19 Status: Active  When in life skills group client will learn and practice some strategies to help with better management of stress in her daily life providing an update of progress weekly.      Progress:   5/14/19 Client will need to be alert to using coping skills in her work situation to manage stress when it occurs.        Treatment Strategies:   Facilitate increased self awareness  Provide education regarding stress management coping skills.          Area of Treatment Focus:   Symptom Stabilization and Management  Start Date:    4/25/19    Goal:  Target Date: 6/20/19 Status: Rita Garciay will learn and practice skills to manage Bipolar illness, particularly manuela with psychosis.  She will practice allwoing time to rest, building stress management skills, and increasing social support.      Progress:           Treatment Strategies:   Teach adaptive coping skills and communication skills          Area of Treatment Focus:   Wellness and Mental Health   Start Date:    4/25/19    Goal:  Target Date: 6/20/19 Status: Rita Barros will improve wellness related behaviors by getting enough sleep,exercise and balanced nutrition.        Progress:           Treatment Strategies:   Facilitate increased self awareness  Provide education regarding wellness habits and behaviors which help to improve mental health.          Area of Treatment Focus:   Community Resources / Support and Discharge Planning  Start Date:    4/25/19    Goal:  Target Date: 6/20/19 Status: Rita Barros will establish  "an aftercare plan to include medical providers and social supports by discharge.       Progress:           Treatment Strategies:   Facilitate increased self awareness  Provide education regarding discharge planning and social support.     Joseph Mckeon, OTR/L      NOTE: Required signatures are completed manually and scanned into the electronic medical record. See \"Media\" tab in epic.      "

## 2019-04-22 NOTE — PROGRESS NOTES
"Adult Mental Health Outpatient Group Therapy Progress Note     Client Initial Individualized Goals for Treatment: \"Stress Management\"    See Initial Treatment suggestions for the client during the time between Diagnostic Assessment and completion of the Master Individualized Treatment Plan.    Treatment Goals:     To be determined.     Area of Treatment Focus:  Symptom Management, Personal Safety and Community Resources/Discharge Planning    Therapeutic Interventions/Treatment Strategies:  Support, Redirection, Safety Assessments, Education and Cognitive Behavioral Therapy    Response to Treatment Strategies:  Listened, Focused on Goals and Attentive     Name of Group: Group Psychotherapy I and Group Psychotherapy II  Date/Time: 4/22/19 / 0900 to 0950 and 1000 to 1050    Number of Group Participants: 8     Description and Outcome:  Fiorella shared that the weekend went well for her.  She stated that she enjoyed spending time outdoors.  She stated that she is also enjoying spending time with her Mom, who is visiting for a week to assist with the transition out of the hospital.  She stated that she checked her work e-mail, which she was not sure was a good idea, as she found out that a peer had resigned.  She reported difficulty letting go of work responsibilities and concerns.  Client denied suicidal ideation, intent and plan.     Group Therapy II:  Client participated in discussion on skills to manage avoidance behaviors associated with anxiety.  Client learned about skills to decrease perfectionism and procrastination.  Writer discussed ways to increase motivation.      Client demonstrated understanding of session content by introducing herself, and sharing current symptoms and stressors.      Is this a Weekly Review of the Progress on the Treatment Plan?  No    "

## 2019-04-22 NOTE — PROGRESS NOTES
Acknowledgement of Current Treatment Plan       I have reviewed my treatment plan with my therapist / counselor on 4/25/19.   I agree with the plan as it is written in the electronic health record. (5A)    Name:      Signature:  Fiorella Hernandez MD  Psychiatrist    Radha Morin, NP    Bri Espinal, [facility]  Psychotherapist    Trina Ann, RN  Nurse Liaison    Mikhail Mckeon, OTR/L  Occupational Therapist

## 2019-04-22 NOTE — TELEPHONE ENCOUNTER
Pharmacy Filling the Rx: [facility] [place], [place] - [address]  Filling Pharmacy Phone: [phone]  Filling Pharmacy Fax: [phone]    [facility]  [address]  [address], [place] [address]   [email or web address]  [email or web address]   Phone: [phone]  Pager: [phone]  Fax: [phone]

## 2019-04-23 ENCOUNTER — HOSPITAL ENCOUNTER (OUTPATIENT)
Dept: BEHAVIORAL HEALTH | Facility: CLINIC | Age: 34
End: 2019-04-23
Attending: PSYCHIATRY & NEUROLOGY
Payer: COMMERCIAL

## 2019-04-23 PROCEDURE — H2012 BEHAV HLTH DAY TREAT, PER HR: HCPCS

## 2019-04-23 NOTE — PROGRESS NOTES
"Adult Mental Health Outpatient Group Therapy Progress Note     Client Initial Individualized Goals for Treatment: \"Stress Management\"    See Initial Treatment suggestions for the client during the time between Diagnostic Assessment and completion of the Master Individualized Treatment Plan.    Treatment Goals:     To be determined.     Area of Treatment Focus:  Symptom Management, Personal Safety and Community Resources/Discharge Planning    Therapeutic Interventions/Treatment Strategies:  Support, Redirection, Safety Assessments, Education and Cognitive Behavioral Therapy    Response to Treatment Strategies:  Listened, Focused on Goals and Attentive     Name of Group: Group Psychotherapy II  Date/Time: 4/23/19 /  1000 to 1050    Number of Group Participants: 8     Description and Outcome:  Fiorella did not attend the first session due to oversleeping.    Group Therapy II: Client participated in a discussion introducing cognitive therapy concepts.  Writer reviewed examples of how thoughts about activating events influence emotions and behaviors.  Client shared examples from their experiences.  Client completed a worksheet on challenging automatic negative thoughts.     Client demonstrated understanding of session content by introducing herself, and sharing current symptoms and stressors.    Is this a Weekly Review of the Progress on the Treatment Plan?  No  "

## 2019-04-24 NOTE — PROGRESS NOTES
"Adult Mental Health Outpatient Group Therapy Progress Note     Client Initial Individualized Goals for Treatment:  \"Stress Management\"           See Initial Treatment suggestions for the client during the time between Diagnostic Assessment and completion of the Master Individualized Treatment Plan.    Treatment Goals:  Follow Safety Plan   Ask for more information, support and/or assistance as needed.  Follow up with providers/community supports as needed:   Report increases or changes in symptoms to staff.  Report any personal safety concerns to staff.   Take medications as prescribed.  Report medication changes and/or side effects to staff.  Attend and participate in groups as scheduled or notify staff if unable to do so.  Report any use of substances to staff as this may impact your symptoms and/or            personal safety.  Notify staff if you have any other issues that need to be addressed. This may include    any current abuse / neglect / exploitation or other vulnerability.  Follow recommendations of your treatment team and discuss concerns if not in  agreement.         Area of Treatment Focus:  Symptom Management    Therapeutic Interventions/Treatment Strategies:  Support, Feedback, Safety Assessments, Structured Activity and Education    Response to Treatment Strategies:  Accepted Feedback, Listened, Attentive, Accepted Support and Alert     Name of Group: Life  Skills(10:00-12:00)     Number of Group Participants: 6    Description and Outcome:  Client presented as mildly anxious with good focus and concentration. Psychosocial skills addressed included stress avoidance,leisure education,self-esteem and practice of communication strategies.Initial treatment goals identified by client include stress management,decision making and social support(mental health)  Client would benefit from additional opportunities to practice and implement content from this session  in every day life,relationships and mental " health recovery.    Is this a Weekly Review of the Progress on the Treatment Plan?  Yes.      Are Treatment Plan Goals being addressed?  Yes, continue treatment goals      Are Treatment Plan Strategies to Address Goals Effective?  Yes, continue treatment strategies      Are there any current contracts in place?  No

## 2019-04-25 ENCOUNTER — HOSPITAL ENCOUNTER (OUTPATIENT)
Dept: BEHAVIORAL HEALTH | Facility: CLINIC | Age: 34
End: 2019-04-25
Attending: PSYCHIATRY & NEUROLOGY
Payer: COMMERCIAL

## 2019-04-25 ENCOUNTER — ALLIED HEALTH/NURSE VISIT (OUTPATIENT)
Dept: PHARMACY | Facility: CLINIC | Age: 34
End: 2019-04-25
Payer: COMMERCIAL

## 2019-04-25 DIAGNOSIS — F99 MENTAL HEALTH DISORDER: Primary | ICD-10-CM

## 2019-04-25 DIAGNOSIS — Z78.9 TAKES DIETARY SUPPLEMENTS: ICD-10-CM

## 2019-04-25 PROCEDURE — H2012 BEHAV HLTH DAY TREAT, PER HR: HCPCS

## 2019-04-25 PROCEDURE — 99606 MTMS BY PHARM EST 15 MIN: CPT | Performed by: PHARMACIST

## 2019-04-25 NOTE — PROGRESS NOTES
"Adult Mental Health Outpatient Group Therapy Progress Note     Client Initial Individualized Goals for Treatment: \"Stress Management\"    See Initial Treatment suggestions for the client during the time between Diagnostic Assessment and completion of the Master Individualized Treatment Plan.    Treatment Goals:     To be determined.     Area of Treatment Focus:  Symptom Management, Personal Safety and Community Resources/Discharge Planning    Therapeutic Interventions/Treatment Strategies:  Support, Redirection, Safety Assessments, Education and Cognitive Behavioral Therapy    Response to Treatment Strategies:  Listened, Focused on Goals and Attentive     Name of Group: Group Psychotherapy I and Group Psychotherapy II  Date/Time: 4/25/19 / 0900 to 0950 and 1000 to 1050    Number of Group Participants: 6    Description and Outcome:  Fiorella arrived to the first session with 20 minutes remaining.  She ws not charged for the session.  She reported that she would like to return to work in 2-3 weeks.  She stated that she has 8 weeks of FMLA left but has not been getting paid.  She stated that she is unsure if she can use her accumulated sick time or if she has short term disability coverage.  Fiorella denied difficulty with elevated mood, denied unusal or psychotic thoughts, and denied impulsivity.  Sleep was improving.    Group Therapy II: Writer provided information on cognitive distortions and skills to practice to challenge the negative thoughts.  The group discussed particular cognitive distortions that were bothersome in their personal lives and shared the skills they were practicing.       Client demonstrated understanding of session content by introducing herself, and sharing current symptoms and stressors.      Is this a Weekly Review of the Progress on the Treatment Plan?  Yes.      Are Treatment Plan Goals being addressed?  Yes, continue treatment goals      Are Treatment Plan Strategies to Address Goals " Effective?  Yes, continue treatment strategies      Are there any current contracts in place?  No

## 2019-04-25 NOTE — PATIENT INSTRUCTIONS
Recommendations from today's MTM visit:                                                      1. Taking fish oil, a probiotic, iodine or B12 (both of the latter can be found in a multivitamin) are likely safe options with your medications. You may want to run these by psychiatry, if only just to make sure they know what you are taking. In the future, if you wish to start an herbal or supplement, consult with psychiatry or your retail pharmacist.    Next MTM visit: Call anytime if you have further questions. If you are interested in ongoing pharmacist clinic visits, ask your primary care doctor about MTM pharmacists within the [facility] system.    To schedule another MTM appointment, please call the clinic directly or you may call the MTM scheduling line at [phone] or toll-free at [phone].     My Clinical Pharmacist's contact information:                                                      It was a pleasure talking with you today!  Please feel free to contact me with any questions or concerns you have.      Veda Cotter, Fer[place], BCACP  Medication Therapy Management Provider  Phone: [phone]  [email or web address]    You may receive a survey about the MTM services you received by email and/or US Mail.  I would appreciate your feedback to help me serve you better in the future. Your comments will be anonymous.

## 2019-04-25 NOTE — PROGRESS NOTES
SUBJECTIVE/OBJECTIVE:                Fiorella Bartlett is a 33 year old female called for a follow-up transitions of care visit.  She was discharged from [facility] on 04/09/19 for manuela/paranoid psychoisis.     Chief Complaint: Pt has no ongoing questions today.    Allergies/ADRs: Reviewed in Epic  Tobacco: No tobacco use   Alcohol: not currently using- maybe once a month historically  Caffeine: less than daily  Activity: plans to increase now that she is out of the hospital  PMH: Reviewed in Epic    Medication Adherence/Access:  Patient takes medications directly from bottles.  Patient takes medications 2 time(s) per day.   Per patient, misses medication 0 times per week.     Bipolar with Paranoia and Anxiety: Pt [place] [facility] [place] [place] [facility] and saw her this week. Pt takes Latuda 80mg at Noon and Seroquel 200mg before bedtime, hydroxyzine as needed. Psych started her on clonazepam 0.5mg as needed for her akathesia. She is also using a light box.     Other supplements: Pt is wondering whether she could take fish oil, a probiotic, iodine, or B12 (whether these would be safe with her medications). She can't remember the name of the provider but she saw a natural med specialist of some kind who suggested these for her.    Today's Vitals: There were no vitals taken for this visit.     Last Comprehensive Metabolic Panel:  Sodium   Date Value Ref Range Status   02/04/2019 140 133 - 144 mmol/L Final     Potassium   Date Value Ref Range Status   02/04/2019 4.2 3.4 - 5.3 mmol/L Final     Chloride   Date Value Ref Range Status   02/04/2019 107 94 - 109 mmol/L Final     Carbon Dioxide   Date Value Ref Range Status   02/04/2019 27 20 - 32 mmol/L Final     Anion Gap   Date Value Ref Range Status   02/04/2019 6 3 - 14 mmol/L Final     Glucose   Date Value Ref Range Status   02/04/2019 95 70 - 99 mg/dL Final     Urea Nitrogen   Date Value Ref Range Status   02/04/2019 12 7 - 30 mg/dL Final     Creatinine    Date Value Ref Range Status   02/04/2019 0.80 0.52 - 1.04 mg/dL Final     GFR Estimate   Date Value Ref Range Status   02/04/2019 >90 >60 mL/min/[1.73_m2] Final     Comment:     Non  GFR Calc  Starting 12/18/2018, serum creatinine based estimated GFR (eGFR) will be   calculated using the Chronic Kidney Disease Epidemiology Collaboration   (CKD-EPI) equation.       Calcium   Date Value Ref Range Status   02/04/2019 8.6 8.5 - 10.1 mg/dL Final     ASSESSMENT:                 Current medications were reviewed today.      Medication Adherence: good, no issues identified    Bipolar with Paranoia and Anxiety: Improving. Pt to continue to follow up with psych as planned.    Other supplements: May need improvement. Discussed commonly marketed doses (or RDI) of fish oil, probiotics, iodine and B12 are likely safe with her other medications. Pt encouraged to continue to ask either psychiatry or pharmacy about safety of trying supplements.    PLAN:                  Post Discharge Medication Reconciliation Status: discharge medications reconciled, continue medications without change.    1. Pt may start multivitamin (for B12 and iodine), fish oil, or probiotic if she wishes, though efficacy for general health is mixed without evidence of deficiency.    I spent 15 minutes with this patient today. I offer these suggestions with the understanding that I don't fully understand Fiorella's past medical history and the complexity of her health conditions. Fiorella should make no changes without the approval of her physician. A copy of the visit note was provided to the patient's primary care provider.    Will follow up upon request, as pt is within [facility] system and was encouraged to seek [facility] care through her PCP.    The patient was mailed a summary of these recommendations as an after visit summary.    Chart documentation was completed in part with Dragon voice-recognition software. Even though reviewed, some  grammatical, spelling, and word errors may remain.    Veda Cotter PharmD, [facility]  Medication Therapy Management Provider  Phone: [phone]  [email or web address]

## 2019-04-25 NOTE — PROGRESS NOTES
"  Adult Mental Health Outpatient Group Therapy Progress Note     Client Initial Individualized Goals for Treatment: \"Stress Management\"     See Initial Treatment suggestions for the client during the time between Diagnostic Assessment and completion of the Master Individualized Treatment Plan.     Treatment Goals:      To be determined.       Area of Treatment Focus:  Symptom Management    Therapeutic Interventions/Treatment Strategies:  Support, Feedback, Structured Activity, Clarification and Education    Response to Treatment Strategies:  Accepted Feedback, Gave Feedback, Listened, Focused on Goals, Attentive, Accepted Support and Alert    Name of Group:  Mental health management [id number], 6 participants     Description and Outcome:  The group was given a structured journaling worksheet with the focus on feeling identification, expression and containment.  Information on the purpose and benefits of structured journaling was discussed.  Group members were offered the opportunity to share part, all, or none of their journaling and were encouraged to comment on process. Fiorella verbalized understanding of session and shared that she found the feeling list helpful.    Is this a Weekly Review of the Progress on the Treatment Plan?  No        "

## 2019-04-29 ENCOUNTER — HOSPITAL ENCOUNTER (OUTPATIENT)
Dept: BEHAVIORAL HEALTH | Facility: CLINIC | Age: 34
End: 2019-04-29
Attending: PSYCHIATRY & NEUROLOGY
Payer: COMMERCIAL

## 2019-04-29 PROCEDURE — H2012 BEHAV HLTH DAY TREAT, PER HR: HCPCS

## 2019-04-30 RX ORDER — CLONAZEPAM 0.5 MG/1
0.5 TABLET ORAL 2 TIMES DAILY PRN
Status: ON HOLD | COMMUNITY
End: 2021-08-21

## 2019-04-30 NOTE — PROGRESS NOTES
Past Medical History:   Diagnosis Date     Depressive disorder        Current Outpatient Medications:      cholecalciferol (VITAMIN D3) 5000 units TABS tablet, Take 5,000-10,000 Units by mouth daily, Disp: , Rfl:      hydrOXYzine (ATARAX) 25 MG tablet, Take 1-2 tablets (25-50 mg) by mouth 3 times daily as needed for other (adjuvant pain), Disp: 120 tablet, Rfl: 0     lurasidone (LATUDA) 80 MG TABS tablet, Take 1 tablet (80 mg) by mouth daily (with lunch), Disp: 30 tablet, Rfl: 0     QUEtiapine (SEROQUEL) 200 MG tablet, Take 1 tablet (200 mg) by mouth At Bedtime, Disp: 30 tablet, Rfl: 0     Care discussed with team: here post manuela, bipolar 1 recently diagnosed with psychotic features.

## 2019-04-30 NOTE — PROGRESS NOTES
"Adult Mental Health Outpatient Group Therapy Progress Note     Client Initial Individualized Goals for Treatment: \"Stress Management\"    See Initial Treatment suggestions for the client during the time between Diagnostic Assessment and completion of the Master Individualized Treatment Plan.    Treatment Goals:  1. Client will notify staff when needing assistance to develop or implement a coping plan to manage suicidal or self injurious urges  2. When in life skills group client will learn and practice some strategies to help with better management of stress in her daily life providing an update of progress weekly.Fiorella will establish an aftercare plan to include medical providers and social supports by discharge.   3. Fiorella will learn and practice skills to manage Bipolar illness, particularly manuela with psychosis.  She will practice allwoing time to rest, building stress management skills, and increasing social support.   4. Fiorella will establish an aftercare plan to include medical providers and social supports by discharge.   5. Fiorella will improve wellness related behaviors by getting enough sleep,exercise and balanced nutrition.     Area of Treatment Focus:  Symptom Management, Personal Safety and Community Resources/Discharge Planning    Therapeutic Interventions/Treatment Strategies:  Support, Redirection, Safety Assessments, Education and Cognitive Behavioral Therapy    Response to Treatment Strategies:  Listened, Focused on Goals and Attentive     Name of Group: Group Psychotherapy II  Date/Time: 4/29/19 /  0900 to 0950 and 1100 to 1150    Number of Group Participants: 5     Description and Outcome:  Fiorella reported seeomg a freomd amd going to an [employer], which she enjoyed.  Hedomenic stated that she was able to do laundry and some cooking as practice work preparations.  She would like to return to work in a couple of weeks.  She also followed through on goal to e-mail work to inquire if she could " "use her sick time or short term disability to cover pay for her time off work.  She stated that her mood was \"pretty good\", she had some anxiety in the evening, and some restlessness before bed.  She denied disorganized thoughts, confusion.  She reported adequate decision making.  Client denied suicidal ideation, intent and plan.     Group Psychotherapy II: Client participated in a discussion on boundaries.  Writer provided education on what are personal boundaries, common traits of rigid, porous, and healthy boundaries, and types of boundaries people set.  Writer reviewed tips for setting healthy boundaries.      Client demonstrated understanding of session content by introducing herself, and sharing current symptoms and stressors.    Is this a Weekly Review of the Progress on the Treatment Plan?  No  "

## 2019-05-02 ENCOUNTER — HOSPITAL ENCOUNTER (OUTPATIENT)
Dept: BEHAVIORAL HEALTH | Facility: CLINIC | Age: 34
End: 2019-05-02
Attending: PSYCHIATRY & NEUROLOGY
Payer: COMMERCIAL

## 2019-05-02 PROCEDURE — H2012 BEHAV HLTH DAY TREAT, PER HR: HCPCS

## 2019-05-02 NOTE — PROGRESS NOTES
"  Adult Mental Health Outpatient Group Therapy Progress Note     Client Initial Individualized Goals for Treatment: \"Stress Management\"     See Initial Treatment suggestions for the client during the time between Diagnostic Assessment and completion of the Master Individualized Treatment Plan.     Treatment Goals:  1. Client will notify staff when needing assistance to develop or implement a coping plan to manage suicidal or self injurious urges  2. When in life skills group client will learn and practice some strategies to help with better management of stress in her daily life providing an update of progress weekly.Fiorella will establish an aftercare plan to include medical providers and social supports by discharge.   3. Fiorella will learn and practice skills to manage Bipolar illness, particularly manuela with psychosis.  She will practice allwoing time to rest, building stress management skills, and increasing social support.   4. Fiorella will establish an aftercare plan to include medical providers and social supports by discharge.   5. Fiorella will improve wellness related behaviors by getting enough sleep,exercise and balanced nutrition.        Area of Treatment Focus:  Symptom Management    Therapeutic Interventions/Treatment Strategies:  Support, Feedback, Structured Activity and Clarification    Response to Treatment Strategies:  Accepted Feedback, Gave Feedback, Listened, Focused on Goals, Attentive, Accepted Support and Alert    Name of Group:  Mental health management [id number], 6 participants     Description and Outcome:  Group members were guided through a body scan focusing on use of breath to notice sensation, feeling and thought.  Imagery was introduced encouraging clients to consider change in seasons, coming of spring and where they are in their own growth process.  Clients were prompted to draw (or write), using nature as a prompt where they feel they are in their growth process.  Client demonstrated " understanding of session by engaging in experiential. Fiorella was reluctant to draw , acknowledging perfectionism as a barrier.  She took the risk and brenda dandelion which represented a growing flower that is also imperfect.  It was noted by peers that sunflowers turn toward the sun.    Is this a Weekly Review of the Progress on the Treatment Plan?  No

## 2019-05-02 NOTE — PROGRESS NOTES
"Adult Mental Health Outpatient Group Therapy Progress Note     Client Initial Individualized Goals for Treatment: \"Stress Management\"    See Initial Treatment suggestions for the client during the time between Diagnostic Assessment and completion of the Master Individualized Treatment Plan.    Treatment Goals:  1. Client will notify staff when needing assistance to develop or implement a coping plan to manage suicidal or self injurious urges  2. When in life skills group client will learn and practice some strategies to help with better management of stress in her daily life providing an update of progress weekly.Fiorella will establish an aftercare plan to include medical providers and social supports by discharge.   3. Fiorella will learn and practice skills to manage Bipolar illness, particularly manuela with psychosis.  She will practice allwoing time to rest, building stress management skills, and increasing social support.   4. Fiorella will establish an aftercare plan to include medical providers and social supports by discharge.   5. Fiorella will improve wellness related behaviors by getting enough sleep,exercise and balanced nutrition.     Area of Treatment Focus:  Symptom Management, Personal Safety and Community Resources/Discharge Planning    Therapeutic Interventions/Treatment Strategies:  Support, Redirection, Safety Assessments, Education and Cognitive Behavioral Therapy    Response to Treatment Strategies:  Listened, Focused on Goals and Attentive     Name of Group: Group Psychotherapy Date/Time: 5/2/2019 /  0900 to 0950   Number of Group Participants: 6    Description and Outcome:    Fiorella endorsed absence of suicidal ideation and self-injurious behavior urges. She described that she did not want to attend group today due to feeling fatigued and also noted that she tends to be \"sensitive to emotions in psychotherapy.\" She shared that she tends to be a \"fixer\" so she struggles with attending group. Fiorella shared " "that she spent the day with a friend yesterday which was \"fun.\" She noted that she may have to return to work in several weeks due to not having short term disability. She added that she is trying to think of this as a positive. She also noted that she plans to see her doctor next week. Fiorella participated minimally in group in terms of offering feedback to peers. She received validation and support from peers.     Sammi Benson, [facility], LADC  5/2/2019      Is this a Weekly Review of the Progress on the Treatment Plan?  No  "

## 2019-05-06 ENCOUNTER — HOSPITAL ENCOUNTER (OUTPATIENT)
Dept: BEHAVIORAL HEALTH | Facility: CLINIC | Age: 34
End: 2019-05-06
Attending: PSYCHIATRY & NEUROLOGY
Payer: COMMERCIAL

## 2019-05-06 PROCEDURE — H2012 BEHAV HLTH DAY TREAT, PER HR: HCPCS

## 2019-05-07 ENCOUNTER — HOSPITAL ENCOUNTER (OUTPATIENT)
Dept: BEHAVIORAL HEALTH | Facility: CLINIC | Age: 34
End: 2019-05-07
Attending: PSYCHIATRY & NEUROLOGY
Payer: COMMERCIAL

## 2019-05-07 PROCEDURE — H2012 BEHAV HLTH DAY TREAT, PER HR: HCPCS

## 2019-05-07 NOTE — PROGRESS NOTES
"Adult Mental Health Outpatient Group Therapy Progress Note     Client Initial Individualized Goals for Treatment: \"Stress Management\"    See Initial Treatment suggestions for the client during the time between Diagnostic Assessment and completion of the Master Individualized Treatment Plan.    Treatment Goals:  1. Client will notify staff when needing assistance to develop or implement a coping plan to manage suicidal or self injurious urges  2. When in life skills group client will learn and practice some strategies to help with better management of stress in her daily life providing an update of progress weekly.Fiorella will establish an aftercare plan to include medical providers and social supports by discharge.   3. Fiorella will learn and practice skills to manage Bipolar illness, particularly manuela with psychosis.  She will practice allwoing time to rest, building stress management skills, and increasing social support.   4. Fiorella will establish an aftercare plan to include medical providers and social supports by discharge.   5. Fiorella will improve wellness related behaviors by getting enough sleep,exercise and balanced nutrition.     Area of Treatment Focus:  Symptom Management, Personal Safety and Community Resources/Discharge Planning    Therapeutic Interventions/Treatment Strategies:  Support, Redirection, Safety Assessments, Education and Cognitive Behavioral Therapy    Response to Treatment Strategies:  Listened, Focused on Goals and Attentive     Name of Group: Group Psychotherapy II  Date/Time: 5/7/19 /  0900 to 0950 and 1100 to 1150    Number of Group Participants: 6 in group I and 7 in group II    Description and Outcome:  Fiorella reported finding it difficult to fill her time in a relaxing was, so she found herself cleaning and cooking.  She stated that she has not followed through on goal of obtaining an individual therapist yet.  She stated that she wants a therapist to check in on her self-care skill " practice.  Writer suggested that she find a provider with experience with Bipolar illness as this is a new diagnosis for her.  She stated that she will return to work a week from Thursday.  Client denied suicidal ideation, intent and plan.     Group Psychotherapy II: Client participated in a continued discussion about skills to set or maintain boundaries by practicing skills using role play situations generated by peers.  Client identified emotions and thoughts that interfered with effective setting of boundaries.      Client demonstrated understanding of session content by sharing current symptoms and stressors.    Is this a Weekly Review of the Progress on the Treatment Plan?  No

## 2019-05-08 NOTE — DISCHARGE SUMMARY
Adult Mental Health Intensive Outpatient Discharge Summary/Instructions      Patient: Fiorella Bartlett MRN: [id number]   : 1985 Age: 33 year old Sex: female     Admission Date: 4/15/19    Discharge Date: 19    Diagnosis: 296.52 Bipolar I Disorder Current or Most Recent Episode Depressed, Moderate  300.02 (F41.1) Generalized Anxiety Disorder    Focus of Treatment / Progress    Personal Safety: Fiorella denied suicidal ideation at discharge.     * Follow your safety plan     * Call crisis lines as needed:    [facility] [phone] [facility] [phone]  [facility] [phone] Crisis Connection [phone]  [facility] [phone] [facility] [phone]  [facility] [phone] National Suicide Prevention [phone]  [facility] [phone] Suicide Prevention [phone]  [facility] [phone]    Managing symptoms of:  Fiorella learned and practiced skills to manage Bipolar illness, particularly manuela with psychosis symptoms.  She practiced allowing time to rest, building stress management skills, and increasing social support.  Fiorella also worked on anxiety management skills.  She worked on skills to manage stress in the workplace.    Community support/health:  Fiorella worked on skills to establish stable sleep patterns.  She also looked at healthy nutrition and exercise goals.    Managing Symptoms and Preventing Relapse    * Go to all of your appointments    * Take all medications as directed.      * Carry a current list if medication with you    * Do not use illicit (street) drugs.  Avoid alcohol    * Report these symptoms to your care team. These are early signs of relapse:   Thoughts of suicide   Losing more sleep   Increased confusion   Mood getting worse   Feeling more aggressive   Other:  disorganized thoughts, increased levels of productivity, increased overwhelmed feelings.    *Use these skills daily:  Talk to someone you trust at least one time  "weekly, set boundaries and say \"no\", be assertive, act opposite of negative feelings, accept challenges with a positive attitude, exercise at least three times per week for 30 minutes, get enough sleep, eat healthy foods, get into a good routine    Copy of summary sent to: In Epic    Follow up with psychiatrist / main caregiver: [place] Kd MILLS [place] [facility]  Next visit: 5/20/19    Follow up with your therapist: Anais Zapata, new provider at [facility] in [place]   Next visit: Intake 6/10/19.    Go to group therapy and / or support groups at: [facility] support groups.  For listings see [email or web address]    See your medical doctor about:  For an annual physical exam or any general wellness or illness as needed.    Other:  Your treatment team appreciates having the opportunity to work with you and wishes you the best.    Client Signature:_______________________  Date / Time:___________  Staff Signature:________________________   Date / Time:___________      "

## 2019-05-08 NOTE — PROGRESS NOTES
"Adult Mental Health Outpatient Group Therapy Progress Note     Client Initial Individualized Goals for Treatment:  \"Stress Management\"           See Initial Treatment suggestions for the client during the time between Diagnostic Assessment and completion of the Master Individualized Treatment Plan.    Treatment Goals:  1. Client will notify staff when needing assistance to develop or implement a coping plan to manage suicidal or self injurious urges.  Client will use coping plan for safety, as needed.  2. When in life skills group client will learn and practice some strategies to help with better management of stress in her daily life providing an update of progress weekly.  3. Fiorella will learn and practice skills to manage Bipolar illness, particularly manuela with psychosis.  She will practice allwoing time to rest, building stress management skills, and increasing social support.  4. Fiorella will improve wellness related behaviors by getting enough sleep,exercise and balanced nutrition.  5. Fiorella will establish an aftercare plan to include medical providers and social supports by discharge         Area of Treatment Focus:  Symptom Management    Therapeutic Interventions/Treatment Strategies:  Support, Feedback, Safety Assessments, Structured Activity and Education    Response to Treatment Strategies:  Accepted Feedback, Listened, Attentive, Accepted Support and Alert     Name of Group: Life  Skills(10:00-11:00)     Number of Group Participants: 7    Description and Outcome:  Client presented as mildly anxious with good focus and concentration. Psychosocial skills addressed included a discussion related to communication styles. Goal #1 (no personal safety concerns reported or observed).  Client would benefit from additional opportunities to practice and implement content from this session  in every day life,relationships and mental health recovery.    Is this a Weekly Review of the Progress on the Treatment " Plan?  Yes.      Are Treatment Plan Goals being addressed?  Yes, continue treatment goals      Are Treatment Plan Strategies to Address Goals Effective?  Yes, continue treatment strategies      Are there any current contracts in place?  No

## 2019-05-08 NOTE — PROGRESS NOTES
"Adult Mental Health Outpatient Group Therapy Progress Note     Client Initial Individualized Goals for Treatment: \"Stress Management\"    See Initial Treatment suggestions for the client during the time between Diagnostic Assessment and completion of the Master Individualized Treatment Plan.    Treatment Goals:  1. Client will notify staff when needing assistance to develop or implement a coping plan to manage suicidal or self injurious urges  2. When in life skills group client will learn and practice some strategies to help with better management of stress in her daily life providing an update of progress weekly.Fiorella will establish an aftercare plan to include medical providers and social supports by discharge.   3. Fiorella will learn and practice skills to manage Bipolar illness, particularly manuela with psychosis.  She will practice allwoing time to rest, building stress management skills, and increasing social support.   4. Fiorella will establish an aftercare plan to include medical providers and social supports by discharge.   5. Fiorella will improve wellness related behaviors by getting enough sleep,exercise and balanced nutrition.     Area of Treatment Focus:  Symptom Management, Personal Safety and Community Resources/Discharge Planning    Therapeutic Interventions/Treatment Strategies:  Support, Redirection, Safety Assessments, Education and Cognitive Behavioral Therapy    Response to Treatment Strategies:  Listened, Focused on Goals and Attentive     Name of Group: Group Psychotherapy II  Date/Time: 5/6/19 /  0900 to 0950 and 1100 to 1150    Number of Group Participants:8    Description and Outcome:  Fiorella reported hearing from her workplace that she hs used all of her sick and vacation pay.  She also was told that they do not have short term disability insurance for the employees.  She stated that she plans to return to work next Thursday and will work 32 hours per week.  She reported calm mood, improved focus, " stable sleep, stable appetite, and adequate energy level.  She denied elevated or irritable mood, racing thoughts, and impulsive behaviors.  Client denied suicidal ideation, intent and plan.     Group Psychotherapy II: Client participated in a discussion of self-care skills to use during periods of high symptoms.  Group brainstormed skills to try including relaxation, distraction, positive self-talk, and asking for support using assertive communication skills.     Client demonstrated understanding of session content by  sharing current symptoms and stressors.    Is this a Weekly Review of the Progress on the Treatment Plan?  No

## 2019-05-09 ENCOUNTER — HOSPITAL ENCOUNTER (OUTPATIENT)
Dept: BEHAVIORAL HEALTH | Facility: CLINIC | Age: 34
End: 2019-05-09
Attending: PSYCHIATRY & NEUROLOGY
Payer: COMMERCIAL

## 2019-05-09 PROCEDURE — H2012 BEHAV HLTH DAY TREAT, PER HR: HCPCS

## 2019-05-09 NOTE — PROGRESS NOTES
"  Adult Mental Health Outpatient Group Therapy Progress Note     Client Initial Individualized Goals for Treatment: \"Stress Management\"     See Initial Treatment suggestions for the client during the time between Diagnostic Assessment and completion of the Master Individualized Treatment Plan.     Treatment Goals:  1. Client will notify staff when needing assistance to develop or implement a coping plan to manage suicidal or self injurious urges  2. When in life skills group client will learn and practice some strategies to help with better management of stress in her daily life providing an update of progress weekly.Fiorella will establish an aftercare plan to include medical providers and social supports by discharge.   3. Fiorella will learn and practice skills to manage Bipolar illness, particularly manuela with psychosis.  She will practice allwoing time to rest, building stress management skills, and increasing social support.   4. Fiorella will establish an aftercare plan to include medical providers and social supports by discharge.   5. Fiorella will improve wellness related behaviors by getting enough sleep,exercise and balanced nutrition.      Area of Treatment Focus:  Symptom Management    Therapeutic Interventions/Treatment Strategies:  Support, Feedback, Clarification and Education    Response to Treatment Strategies:  Accepted Feedback, Gave Feedback, Listened, Focused on Goals, Attentive, Accepted Support and Alert    Name of Group:  Mental health management [id number], 5 participants     Description and Outcome:  The group was provided with education regarding mindfulness, using Orlando Hazel's acronym of COAL and SIFT as a reference.  The group discussed identified barriers to practising mindfulness as well as the benefits. The group focused on the word \"consider\" or \"consideration\" as related to the concept of mindfulness as they were led through guided breath and body scan.  Client demonstrated understanding of " session by engaging in experiential and contributing to discussion    Is this a Weekly Review of the Progress on the Treatment Plan?  No

## 2019-05-09 NOTE — PROGRESS NOTES
"Adult Mental Health Outpatient Group Therapy Progress Note     Client Initial Individualized Goals for Treatment: \"Stress Management\"    See Initial Treatment suggestions for the client during the time between Diagnostic Assessment and completion of the Master Individualized Treatment Plan.    Treatment Goals:  1. Client will notify staff when needing assistance to develop or implement a coping plan to manage suicidal or self injurious urges  2. When in life skills group client will learn and practice some strategies to help with better management of stress in her daily life providing an update of progress weekly.Fiorella will establish an aftercare plan to include medical providers and social supports by discharge.   3. Fiorella will learn and practice skills to manage Bipolar illness, particularly manuela with psychosis.  She will practice allowing time to rest, building stress management skills, and increasing social support.   4. Fiorella will establish an aftercare plan to include medical providers and social supports by discharge.   5. Fiorella will improve wellness related behaviors by getting enough sleep,exercise and balanced nutrition.     Area of Treatment Focus:  Symptom Management, Personal Safety and Community Resources/Discharge Planning    Therapeutic Interventions/Treatment Strategies:  Support, Redirection, Safety Assessments, Education and Cognitive Behavioral Therapy    Response to Treatment Strategies:  Listened, Focused on Goals and Attentive     Name of Group: Group Psychotherapy I and Group Psychotherapy II  Date/Time: 5/9/19 / 0900 to 0950 and 1000 to 1050    Number of Group Participants: 6    Description and Outcome:  iForella stated that she saw her psychiatry provider who decreased the Seroquel to help with the low energy.  She stated that she plans to call the health insurance case coordinator to get a referral for an individual therapist today.  She stated that she plans to attend a [facility] support group " starting on Friday 5/17 as she will be visiting her parents out of town this Friday.  She stated that she feel okay about planned return to work.  She reported adequate concentration.  She stated that she is using list making to help with task completion.  She stated that she is currently taking daytime naps.  Peers offered suggestions bout how they deal with naps when they have transitioned back to work in the past.  Mood seemed calm. Fiorella denied difficulty with elevated mood, denied unusual or psychotic thoughts, and denied impulsivity.  Sleep was improving.    Group Psychotherapy II: Client participated in a discussion on conflict resolution skills.  Client identified and shared one conflict situation with the group.  Client actively participated.       Client demonstrated understanding of session content by introducing herself, and sharing current symptoms and stressors.      Is this a Weekly Review of the Progress on the Treatment Plan?  Yes.      Are Treatment Plan Goals being addressed?  Yes, continue treatment goals      Are Treatment Plan Strategies to Address Goals Effective?  Yes, continue treatment strategies      Are there any current contracts in place?  No

## 2019-05-13 ENCOUNTER — HOSPITAL ENCOUNTER (OUTPATIENT)
Dept: BEHAVIORAL HEALTH | Facility: CLINIC | Age: 34
End: 2019-05-13
Attending: PSYCHIATRY & NEUROLOGY
Payer: COMMERCIAL

## 2019-05-13 PROCEDURE — H2012 BEHAV HLTH DAY TREAT, PER HR: HCPCS

## 2019-05-13 NOTE — PROGRESS NOTES
"Adult Mental Health Outpatient Group Therapy Progress Note     Client Initial Individualized Goals for Treatment: \"Stress Management\"    See Initial Treatment suggestions for the client during the time between Diagnostic Assessment and completion of the Master Individualized Treatment Plan.    Treatment Goals:  1. Client will notify staff when needing assistance to develop or implement a coping plan to manage suicidal or self injurious urges  2. When in life skills group client will learn and practice some strategies to help with better management of stress in her daily life providing an update of progress weekly.Fiorella will establish an aftercare plan to include medical providers and social supports by discharge.   3. Fiorella will learn and practice skills to manage Bipolar illness, particularly manuela with psychosis.  She will practice allwoing time to rest, building stress management skills, and increasing social support.   4. Fiorella will establish an aftercare plan to include medical providers and social supports by discharge.   5. Fiorella will improve wellness related behaviors by getting enough sleep,exercise and balanced nutrition.     Area of Treatment Focus:  Symptom Management, Personal Safety and Community Resources/Discharge Planning    Therapeutic Interventions/Treatment Strategies:  Support, Redirection, Safety Assessments, Education and Cognitive Behavioral Therapy    Response to Treatment Strategies:  Listened, Focused on Goals and Attentive     Name of Group: Group Psychotherapy II  Date/Time: 5/13/19 /  0900 to 0950 and 1100 to 1150    Number of Group Participants:8    Description and Outcome:  Fiorella reported calm mood, stable sleep, and stable energy.  She stated that she enjoyed a visit to [place] to visit her Mom and her in-laws over the weekend.  She stated that she is still feeling somewhat groggy in the morning but her medications are being adjusted to address this concern.  She stated that she is " looking forward to returning to work.  She scheduled a therapy intake at [facility] on 6/10/19.  Client denied suicidal ideation, intent and plan.     Group Psychotherapy II: Client participated in a discussion of self-care skills to use during periods of high symptoms.  Group brainstormed skills to try including relaxation, distraction, positive self-talk, and asking for support using assertive communication skills.     Client demonstrated understanding of session content by  sharing current symptoms and stressors.    Is this a Weekly Review of the Progress on the Treatment Plan?  No

## 2019-05-14 ENCOUNTER — HOSPITAL ENCOUNTER (OUTPATIENT)
Dept: BEHAVIORAL HEALTH | Facility: CLINIC | Age: 34
End: 2019-05-14
Attending: PSYCHIATRY & NEUROLOGY
Payer: COMMERCIAL

## 2019-05-14 PROCEDURE — H2012 BEHAV HLTH DAY TREAT, PER HR: HCPCS

## 2019-05-15 NOTE — PROGRESS NOTES
"Adult Mental Health Outpatient Group Therapy Progress Note     Client Initial Individualized Goals for Treatment: \"Stress Management\"    See Initial Treatment suggestions for the client during the time between Diagnostic Assessment and completion of the Master Individualized Treatment Plan.    Treatment Goals:  1. Client will notify staff when needing assistance to develop or implement a coping plan to manage suicidal or self injurious urges  2. When in life skills group client will learn and practice some strategies to help with better management of stress in her daily life providing an update of progress weekly.Fiorella will establish an aftercare plan to include medical providers and social supports by discharge.   3. Fiorella will learn and practice skills to manage Bipolar illness, particularly manuela with psychosis.  She will practice allwoing time to rest, building stress management skills, and increasing social support.   4. Fiorella will establish an aftercare plan to include medical providers and social supports by discharge.   5. Fiorella will improve wellness related behaviors by getting enough sleep,exercise and balanced nutrition.     Area of Treatment Focus:  Symptom Management, Personal Safety and Community Resources/Discharge Planning    Therapeutic Interventions/Treatment Strategies:  Support, Redirection, Safety Assessments, Education and Cognitive Behavioral Therapy    Response to Treatment Strategies:  Listened, Focused on Goals and Attentive     Name of Group: Group Psychotherapy II  Date/Time: 5/14/19 /  0900 to 0950 and 1100 to 1150    Number of Group Participants:8    Description and Outcome:  Fiorella reported calm mood, stable sleep, and adequate concentration.  She reported improved energy level.  She denied elevated mood, irritable mood, racing thoughts, or impulsivity.  She stated that she felt ready to return to work on Thursday.  She stated that she found out that she does have 108 FMLA hours unpaid " left, so if she needs additional time she can take it.  Client denied suicidal ideation, intent and plan.  Client shared discharge plan with the group.  Client was receptive to feedback from peers on their strengths and progress in the program.    Group Psychotherapy II:  Client participated in a discussion on identifying personal strengths and how they relate to the management of mental health symptoms.  Group members completed an exercise on assessment and recognition of strengths with applications to changing negative thoughts and core beliefs.      Client demonstrated understanding of session content by  sharing current symptoms and stressors.    Is this a Weekly Review of the Progress on the Treatment Plan?  Yes.      Are Treatment Plan Goals being addressed?  Client discharged      Are Treatment Plan Strategies to Address Goals Effective?  Client discharged      Are there any current contracts in place?  No

## 2019-05-15 NOTE — PROGRESS NOTES
"Adult Mental Health Outpatient Group Therapy Progress Note     Client Initial Individualized Goals for Treatment:  \"Stress Management\"           See Initial Treatment suggestions for the client during the time between Diagnostic Assessment and completion of the Master Individualized Treatment Plan.    Treatment Goals:  1. Client will notify staff when needing assistance to develop or implement a coping plan to manage suicidal or self injurious urges.  Client will use coping plan for safety, as needed.  2. When in life skills group client will learn and practice some strategies to help with better management of stress in her daily life providing an update of progress weekly.  3. Fiorella will learn and practice skills to manage Bipolar illness, particularly manuela with psychosis.  She will practice allwoing time to rest, building stress management skills, and increasing social support.  4. Fiorella will improve wellness related behaviors by getting enough sleep,exercise and balanced nutrition.  5. Fiorella will establish an aftercare plan to include medical providers and social supports by discharge         Area of Treatment Focus:  Symptom Management    Therapeutic Interventions/Treatment Strategies:  Support, Feedback, Safety Assessments, Structured Activity and Education    Response to Treatment Strategies:  Accepted Feedback, Listened, Attentive, Accepted Support and Alert     Name of Group: Life  Skills(10:00-11:00)     Number of Group Participants: 8    Description and Outcome:  Client presented as mildly anxious with good focus and concentration. Psychosocial skills addressed included a discussion related to leisure education and mental health. Goal #1 (no personal safety concerns reported or observed).Goal #2 Client reported that her stress at work can increase if she needs to do any management work which she is not comfortable with. Overall client reported improvement in being to advocate for herself,feeling more " comfortable in her living environment and having more sense of control over her life.She also acknowledged improvement in taking care of herself and managing her finances. Goal #5 Client reported that she felt comfortable with her discharge plan and mental health supports at discharge.  Client would benefit from additional opportunities to practice and implement content from this session  in every day life,relationships and mental health recovery.    Is this a Weekly Review of the Progress on the Treatment Plan?  Yes.      Are Treatment Plan Goals being addressed?  Yes, client discharged.      Are Treatment Plan Strategies to Address Goals Effective?  Yes, client discharged.      Are there any current contracts in place?  No

## 2020-03-11 ENCOUNTER — HEALTH MAINTENANCE LETTER (OUTPATIENT)
Age: 35
End: 2020-03-11

## 2021-01-03 ENCOUNTER — HEALTH MAINTENANCE LETTER (OUTPATIENT)
Age: 36
End: 2021-01-03

## 2021-04-25 ENCOUNTER — HEALTH MAINTENANCE LETTER (OUTPATIENT)
Age: 36
End: 2021-04-25

## 2021-08-20 ENCOUNTER — TELEPHONE (OUTPATIENT)
Dept: BEHAVIORAL HEALTH | Facility: CLINIC | Age: 36
End: 2021-08-20

## 2021-08-20 ENCOUNTER — HOSPITAL ENCOUNTER (INPATIENT)
Facility: CLINIC | Age: 36
LOS: 10 days | Discharge: HOME | End: 2021-08-30
Attending: PSYCHIATRY & NEUROLOGY | Admitting: PSYCHIATRY & NEUROLOGY
Payer: COMMERCIAL

## 2021-08-20 ENCOUNTER — HOSPITAL ENCOUNTER (EMERGENCY)
Facility: CLINIC | Age: 36
Discharge: PSYCHIATRIC OR SUBSTANCE USE FACILITY | End: 2021-08-20
Attending: EMERGENCY MEDICINE | Admitting: EMERGENCY MEDICINE
Payer: COMMERCIAL

## 2021-08-20 VITALS
WEIGHT: 143.3 LBS | DIASTOLIC BLOOD PRESSURE: 85 MMHG | HEIGHT: 65 IN | BODY MASS INDEX: 23.88 KG/M2 | TEMPERATURE: 98 F | SYSTOLIC BLOOD PRESSURE: 125 MMHG | OXYGEN SATURATION: 97 % | HEART RATE: 108 BPM | RESPIRATION RATE: 18 BRPM

## 2021-08-20 DIAGNOSIS — R45.851 SUICIDAL IDEATION: ICD-10-CM

## 2021-08-20 DIAGNOSIS — F31.5 BIPOLAR DISORDER, CURRENT EPISODE DEPRESSED, SEVERE, WITH PSYCHOTIC FEATURES (H): ICD-10-CM

## 2021-08-20 DIAGNOSIS — F31.5 BIPOLAR DISORDER, CURRENT EPISODE DEPRESSED, SEVERE, WITH PSYCHOTIC FEATURES (H): Primary | ICD-10-CM

## 2021-08-20 DIAGNOSIS — F31.4 BIPOLAR DISORDER, CURRENT EPISODE DEPRESSED, SEVERE, WITHOUT PSYCHOTIC FEATURES (H): ICD-10-CM

## 2021-08-20 LAB
ALBUMIN SERPL-MCNC: 4.3 G/DL (ref 3.4–5)
ALP SERPL-CCNC: 44 U/L (ref 40–150)
ALT SERPL W P-5'-P-CCNC: 22 U/L (ref 0–50)
AMPHETAMINES UR QL SCN: NORMAL
ANION GAP SERPL CALCULATED.3IONS-SCNC: 5 MMOL/L (ref 3–14)
APAP SERPL-MCNC: <2 MG/L (ref 10–30)
AST SERPL W P-5'-P-CCNC: 15 U/L (ref 0–45)
ATRIAL RATE - MUSE: 110 BPM
BARBITURATES UR QL: NORMAL
BASOPHILS # BLD AUTO: 0 10E3/UL (ref 0–0.2)
BASOPHILS NFR BLD AUTO: 1 %
BENZODIAZ UR QL: NORMAL
BILIRUB SERPL-MCNC: 0.7 MG/DL (ref 0.2–1.3)
BUN SERPL-MCNC: 9 MG/DL (ref 7–30)
CALCIUM SERPL-MCNC: 9 MG/DL (ref 8.5–10.1)
CANNABINOIDS UR QL SCN: NORMAL
CHLORIDE BLD-SCNC: 108 MMOL/L (ref 94–109)
CO2 SERPL-SCNC: 25 MMOL/L (ref 20–32)
COCAINE UR QL: NORMAL
CREAT SERPL-MCNC: 0.77 MG/DL (ref 0.52–1.04)
DIASTOLIC BLOOD PRESSURE - MUSE: NORMAL MMHG
EOSINOPHIL # BLD AUTO: 0 10E3/UL (ref 0–0.7)
EOSINOPHIL NFR BLD AUTO: 0 %
ERYTHROCYTE [DISTWIDTH] IN BLOOD BY AUTOMATED COUNT: 12.2 % (ref 10–15)
GFR SERPL CREATININE-BSD FRML MDRD: >90 ML/MIN/1.73M2
GLUCOSE BLD-MCNC: 96 MG/DL (ref 70–99)
HCG UR QL: NEGATIVE
HCT VFR BLD AUTO: 42.3 % (ref 35–47)
HGB BLD-MCNC: 14 G/DL (ref 11.7–15.7)
IMM GRANULOCYTES # BLD: 0 10E3/UL
IMM GRANULOCYTES NFR BLD: 0 %
INR PPP: 1.02 (ref 0.85–1.15)
INTERPRETATION ECG - MUSE: NORMAL
LYMPHOCYTES # BLD AUTO: 1.8 10E3/UL (ref 0.8–5.3)
LYMPHOCYTES NFR BLD AUTO: 22 %
MCH RBC QN AUTO: 29 PG (ref 26.5–33)
MCHC RBC AUTO-ENTMCNC: 33.1 G/DL (ref 31.5–36.5)
MCV RBC AUTO: 88 FL (ref 78–100)
MONOCYTES # BLD AUTO: 0.4 10E3/UL (ref 0–1.3)
MONOCYTES NFR BLD AUTO: 5 %
NEUTROPHILS # BLD AUTO: 5.9 10E3/UL (ref 1.6–8.3)
NEUTROPHILS NFR BLD AUTO: 72 %
NRBC # BLD AUTO: 0 10E3/UL
NRBC BLD AUTO-RTO: 0 /100
OPIATES UR QL SCN: NORMAL
P AXIS - MUSE: 71 DEGREES
PCP UR QL SCN: NORMAL
PLATELET # BLD AUTO: 240 10E3/UL (ref 150–450)
POTASSIUM BLD-SCNC: 3.7 MMOL/L (ref 3.4–5.3)
PR INTERVAL - MUSE: 152 MS
PROT SERPL-MCNC: 7.6 G/DL (ref 6.8–8.8)
QRS DURATION - MUSE: 62 MS
QT - MUSE: 324 MS
QTC - MUSE: 438 MS
R AXIS - MUSE: 78 DEGREES
RBC # BLD AUTO: 4.83 10E6/UL (ref 3.8–5.2)
SALICYLATES SERPL-MCNC: <2 MG/DL
SARS-COV-2 RNA RESP QL NAA+PROBE: NEGATIVE
SODIUM SERPL-SCNC: 138 MMOL/L (ref 133–144)
SYSTOLIC BLOOD PRESSURE - MUSE: NORMAL MMHG
T AXIS - MUSE: 43 DEGREES
VENTRICULAR RATE- MUSE: 110 BPM
WBC # BLD AUTO: 8.2 10E3/UL (ref 4–11)

## 2021-08-20 PROCEDURE — 90791 PSYCH DIAGNOSTIC EVALUATION: CPT

## 2021-08-20 PROCEDURE — 81025 URINE PREGNANCY TEST: CPT | Performed by: EMERGENCY MEDICINE

## 2021-08-20 PROCEDURE — 36415 COLL VENOUS BLD VENIPUNCTURE: CPT | Performed by: EMERGENCY MEDICINE

## 2021-08-20 PROCEDURE — 80143 DRUG ASSAY ACETAMINOPHEN: CPT | Performed by: EMERGENCY MEDICINE

## 2021-08-20 PROCEDURE — 99285 EMERGENCY DEPT VISIT HI MDM: CPT | Mod: 25

## 2021-08-20 PROCEDURE — C9803 HOPD COVID-19 SPEC COLLECT: HCPCS

## 2021-08-20 PROCEDURE — 87635 SARS-COV-2 COVID-19 AMP PRB: CPT | Performed by: EMERGENCY MEDICINE

## 2021-08-20 PROCEDURE — 80053 COMPREHEN METABOLIC PANEL: CPT | Performed by: EMERGENCY MEDICINE

## 2021-08-20 PROCEDURE — 80307 DRUG TEST PRSMV CHEM ANLYZR: CPT | Performed by: EMERGENCY MEDICINE

## 2021-08-20 PROCEDURE — 85025 COMPLETE CBC W/AUTO DIFF WBC: CPT | Performed by: EMERGENCY MEDICINE

## 2021-08-20 PROCEDURE — 93005 ELECTROCARDIOGRAM TRACING: CPT

## 2021-08-20 PROCEDURE — 85610 PROTHROMBIN TIME: CPT | Performed by: PSYCHIATRY & NEUROLOGY

## 2021-08-20 PROCEDURE — 99204 OFFICE O/P NEW MOD 45 MIN: CPT | Performed by: STUDENT IN AN ORGANIZED HEALTH CARE EDUCATION/TRAINING PROGRAM

## 2021-08-20 PROCEDURE — 250N000013 HC RX MED GY IP 250 OP 250 PS 637: Performed by: STUDENT IN AN ORGANIZED HEALTH CARE EDUCATION/TRAINING PROGRAM

## 2021-08-20 PROCEDURE — 124N000002 HC R&B MH UMMC

## 2021-08-20 PROCEDURE — 36415 COLL VENOUS BLD VENIPUNCTURE: CPT | Performed by: PSYCHIATRY & NEUROLOGY

## 2021-08-20 PROCEDURE — 80179 DRUG ASSAY SALICYLATE: CPT | Performed by: PSYCHIATRY & NEUROLOGY

## 2021-08-20 RX ORDER — LORAZEPAM 1 MG/1
1 TABLET ORAL
Status: COMPLETED | OUTPATIENT
Start: 2021-08-20 | End: 2021-08-20

## 2021-08-20 RX ORDER — OLANZAPINE 5 MG/1
5 TABLET ORAL
Status: COMPLETED | OUTPATIENT
Start: 2021-08-20 | End: 2021-08-20

## 2021-08-20 RX ADMIN — LORAZEPAM 1 MG: 1 TABLET ORAL at 21:39

## 2021-08-20 RX ADMIN — OLANZAPINE 5 MG: 5 TABLET, FILM COATED ORAL at 20:56

## 2021-08-20 ASSESSMENT — ENCOUNTER SYMPTOMS
SLEEP DISTURBANCE: 1
CONFUSION: 1
HEADACHES: 1
FEVER: 0
COUGH: 0
VOMITING: 0

## 2021-08-20 ASSESSMENT — MIFFLIN-ST. JEOR
SCORE: 1345.88
SCORE: 1330.92

## 2021-08-20 NOTE — ED PROVIDER NOTES
"  History   Chief Complaint:  Depression and Insomnia       The history is provided by the patient and the spouse.      Fiorella Bartlett is a 35 year old female with history of depression, bipolar I disorder, sleep deprivation, eating disorder, and brief reactive psychosis who presents for evaluation of depression and insomnia.  Fiorella's  reports she has not been sleeping lately. He states 7 days ago, Fiorella felt stressed from work. Then the next day, 6 days ago, did not seem like herself. Her psychiatrist was consulted and suggested to increase her Seroquel prescription to treat symptoms assumed to be caused the increase of stress from work. 2 days ago, her  reports Fiorella to not seem herself again, and today was a little paranoid. At 1000 today, he reports to have found her upstairs with her Seroquel prescription in her hand, he reported to her that she did not take any doses and he reports to not have witnessed her administer any. In the ED today, Fiorella reports she has been experiencing suicidal thoughts and depression over this last week along with trouble sleeping. She denies any specific suicidal thoughts or plans. She also reports experiencing a headache, present for a few days now, and feeling confused.  She states she has a history of issues with depression and is treated with Seroquel. She reports to have \"shoved a few pills\" into her mouth, later specifying it to have been maybe 25 pills at 1300 today. She denies experiencing any vomiting, fever, or cough. Mentions allergies to penicillin. She was diagnosed with bipolar disorder with hospitilization a couple years ago.    Review of Systems   Unable to perform ROS: Psychiatric disorder   Constitutional: Negative for fever.   Respiratory: Negative for cough.    Gastrointestinal: Negative for vomiting.   Neurological: Positive for headaches.   Psychiatric/Behavioral: Positive for confusion, sleep disturbance and suicidal ideas. " "    Allergies:  Penicillins    Medications:  Seroquel   Latuda   Hydroxyzine   Klonopin     Past Medical History:    Depression  Bipolar I disorder  Sleep deprivation  Akathisia   Eating disorder   brief reactive psychosis   UTI   Breast lumps   Insomnia   Acute respiratory failure   Acne   Migraines     Past Surgical History:    [place] teeth extractions    Family History:    Sister: anxiety  Father: prostate cancer, hypertension, hyperlipidemia  Mother: hypertension, thyroid disorder   Maternal grandfather: diabetes  Maternal grandmother: cancer, cataracts  Paternal grandfather: heart disease, hyperlipidemia     Social History:  The patient was accompanied to the ED by her  and sister.  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     Physical Exam     Patient Vitals for the past 24 hrs:   BP Temp Temp src Pulse Resp SpO2 Height Weight   08/20/21 1448 (!) 130/91 -- -- 87 16 100 % -- --   08/20/21 1133 117/74 98  F (36.7  C) Temporal (!) 121 16 98 % 1.651 m (5' 5\") 63.5 kg (140 lb)       Physical Exam  Nursing note and vitals reviewed.  Constitutional:  Appears well-developed and well-nourished.   HENT:   Head:    Atraumatic.   Mouth/Throat:   Oropharynx is clear and moist. No oropharyngeal exudate.   Eyes:    Pupils are equal, round, and reactive to light.   Neck:    Normal range of motion. Neck supple.      No tracheal deviation present. No thyromegaly present.   Cardiovascular:  Normal rate, regular rhythm, no murmur   Pulmonary/Chest: Breath sounds are clear and equal without wheezes or crackles.  Abdominal:   Soft. Bowel sounds are normal. Exhibits no distension and      no mass. There is no tenderness.      There is no rebound and no guarding.   Musculoskeletal:  Exhibits no edema.   Lymphadenopathy:  No cervical adenopathy.   Psych:  Flat affect, calm and cooperative.  Neurological:   Alert and oriented to person, place, and time.   Skin:    Skin is warm and dry. No " rash noted. No pallor.       Emergency Department Course     ECG:  ECG taken at 1235, ECG read at 1237  Sinus tachycardia  Septal infarct, age underterminewd  Abnormal ECG    Rate 110 bpm. MT interval 152 ms. QRS duration 62 ms. QT/QTc 324/438 ms. P-R-T axes 71 78 43.    Laboratory:  CBC: WBC 8.2, HGB 14.0,      Asymptomatic COVID19 Virus PCR by nasopharyngeal swab: negative     HCG qualitative urine: negative    CMP:  o/w WNL (Creatinine 0.77)     Acetaminophen Level: <2 (L)    Drug abuse screen: all negative      Procedures:  None    Emergency Department Course:    Reviewed:  I reviewed the patient's nursing notes, vitals, past medical records, Care Everywhere.     Assessments:  1140 I performed an exam of the patient as documented above.     1503 Patient rechecked and updated.      Consults:   8925 I consulted with pharmacist, Jarvis, from poison control.    Disposition:  The patient was transferred to [facility].     Impression & Plan      Covid-19  Fiorella Bartlett was evaluated during a global COVID-19 pandemic, which necessitated consideration that the patient might be at risk for infection with the SARS-CoV-2 virus that causes COVID-19.   Applicable protocols for evaluation were followed during the patient's care.   COVID-19 was considered as part of the patient's evaluation. The plan for testing is:  a test was obtained during this visit.    Medical Decision Making:  Fiorella Bartlett is a 35 year old female who presents to the emergency department today for evaluation of suicidal ideations.  She reported that she had overdosed on Seroquel but her  states that he does not think that she did.  I called poison control for advisement, and she is medically cleared from any potential Seroquel overdose.  There is also no sign of Tylenol overdose.  She has exacerbation of her bipolar disorder with suicidal ideations and depression and so she was sent to [facility] for further mental health  evaluation.    Diagnosis:    ICD-10-CM    1. Suicidal ideation  R45.851    2. Bipolar disorder, current episode depressed, severe, without psychotic features (H)  F31.4        Discharge Medications:  New Prescriptions    No medications on file       Scribe Disclosure:  I, Orla Severson, am serving as a scribe at 11:31 AM on 8/20/2021 to document services personally performed by Dr. Sade Easton based on my observations and the provider's statements to me.     [facility] EMERGENCY DEPT         Sade Easton MD  08/20/21 1605

## 2021-08-20 NOTE — PLAN OF CARE
"35 year old female received from ED due to suicide attempt, overdosed on seroquel. Patient stated \"I took 2 pills, but I also  put a few in my hand and took them too\" Expressed that she was feeling suicidal earlier today, but is not feeling that way currently. Identified a new job role at her work as a stressor that triggered her today.     Nursing and risk assessments completed. Assessments reviewed with [facility] and physician. Video monitoring in progress, patient informed.  Admission information reviewed with patient. Patient given a tour of [facility] and instructions on using the facility. Questions regarding [facility] addressed. Pt search completed and belongings inventoried.    "

## 2021-08-20 NOTE — ED NOTES
Writer juan carlos'd collateral from [facility] via [facility] who met with the PT this AM.    She reports that [facility] was called via the PT's sister after the PT's  was unable to.  There was reported and observed concerns of psychosis.  There was reported and observed concerns of confusion and disorientation with sedation.  She states that she observed paranoia and incongruent affect.  She states that it was likely that she was responding to internal stimuli.  She was also blunted and flat.  She states that this was similar to her last presentation for hospitalization back in 2019.  She recently had her Seroquel increased by her psychiatrist Dr Damico at [facility] to 200mg at bedtime.  She reported that sometimes she thinks about dying but did not mention that she had overdosed or was thinking about overdosing.  They recommended either inpatient hospitalization and/or [facility] depending on the ED assessment.

## 2021-08-20 NOTE — ED NOTES
Reported not feeling well depressed, not being able to sleep for more than 1 hour per night, stressed about her job, having passive SI, does not have any plans.  She is Bipolar, admits taking her medications daily.  She is her with her  and sister.

## 2021-08-20 NOTE — CONSULTS
"2021  Patient Name: Fiorella Bartlett   : 1985     [facility] Mental Health Assessment  Started at: 1600  Completed at: 1700  What type of assessment are you doing today? Crisis assessment     1.  Presenting Problem  Referral Method to ED: Community Provider and Family/Friends     What brings the patient to the ED today: Pt presents to the ED post overdose and COPE assessment.  Pt is a 35 year old female who presents as calm and cooperative.  PT reports that she lives at home with her  and cat Pimentel.  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  Pt reports that she is also taking a graduate class in [employer] and technology development.  Pt reports that she feels supported by her  and sister whom were present this AM/this week and in the ED.    PT reports that over the last week-two weeks she has been having more difficultly with stress and sleep.  Pt reports that the promotion and class have been difficult for her.  PT also reports stress of taking their cat to the emergency vet.  PT reports that concurrently she has been losing sleep.    Pt endorses sadness and intermittent hopelessness and worthlessness.  PT reports that this AM she became frustrated with her cat and how she was feeling she grabbed a bottle of Seroquel and overdosed.  There are mixed reports of how many tablets the PT actually took due to her level of confusion/disorganization.  Pt reported she took anywhere from 2 to 60 tablets.  Pt's  notes that its more likely she took only a couple.  Pt states that she wasn't sure if she was trying to die.  Pt then incongruently began laughing.  PT reports increased anxiety.  Pt denies any panic attacks.  Pt denies any sx of psychosis.  Pt denies any paranoia.  Writer challenged her on this and the reports of her  and she stated \"it's likely happening then\". (see collateral for more).  Pt appears to have some disorganization and " asked for questions to be repeated.  PT is not currently overtly psychotic, manuela, or delusional during the assessment.  PT does watch Writer take notes during the assessment.  PT is able to participate and appears alert and oriented x4.  Pt does appear to be masking her sx at this time.    Pt denies any current suicidal ideation, intent, or planning.  Pt does acknowledge her overdose this AM and that she could have harmed herself.  Pt reports previous suicide attempt in 2015 where she was psychotic and overdosed on Tylenol and Vodka.  Pt acknowledges that she likely has command hallucinations that she has difficulty ignoring during increased periods of sx.  PT reports a hx of self harm as a teenager.  PT denies any homicidal ideation.    Pt's last hospitalization was in 2019 where she had back to back hospitalizations with the last being 2 weeks.  She states that she was placed and then removed from [facility] and placed on Seroquel.  Pt has outpatient psychiatry at [facility] [place] [facility] with Dr Gerard and has a private practice therapist Clotilde Soni.    Writer rec'd collateral  From Daniel () [phone]  He reports that PT's last hospitalization was in 2019 and for the last 2 years things have been going really well on Seroquel.  He states that over the last month she has been having some increased stress at work and external stressors.  He has noticed a dramatic change in her sleep and not sleeping which has been increasing her sx.  He states that as of wednesday her psychosis and paranoia become more overt.  He states that she was more confused, disorganized, and paranoid.  He states that she would stand at the windows telling him that her boss is outside.  He states that she impulsively wanted to quit her job.  He states that she has been laying in bed, touching her face, waking up and seemingly disorganized.  He states that today she was much worse and grabbed several bottles of pills and alcohol.  He states that its  possible she took some Seroquel.       Writer rec'd collateral from Lilly galindo LMHP via MILI who met with the PT this AM.     She reports that COPE was called via the PT's sister after the PT's  was unable to.  There was reported and observed concerns of psychosis.  There was reported and observed concerns of confusion and disorientation with sedation.  She states that she observed paranoia and incongruent affect.  She states that it was likely that she was responding to internal stimuli.  She was also blunted and flat.  She states that this was similar to her last presentation for hospitalization back in 2019.  She recently had her Seroquel increased by her psychiatrist Dr Damico at [facility] to 200mg at bedtime.  She reported that sometimes she thinks about dying but did not mention that she had overdosed or was thinking about overdosing.  They recommended either inpatient hospitalization and/or [facility] depending on the ED assessment.    Has this happened before: Yes 2019, 2015    Duration of presenting problem: A week    Additional Stressors: work, medical stress , cat, school    2.  Risk Assessment  Suicide and Self-Harm    ESS-6  1.a. Over the past 2 weeks, have you had thoughts of killing yourself? No   1.b. Have you ever attempted to kill yourself and, if yes, when did this last happen? Yes today  2. Recent or current suicide plan? No  3. Recent or current intent to act on ideation? No  4. Lifetime psychiatric hospitalization? Yes  5. Pattern of excessive substance use? No  6. Current irritability, agitation, or aggression? No  ESS-6 Score: High    SI: N/A  Plan: No  Intent: No   Prior Attempts: Yes previous overdose 2015     Protective Factors: future-oriented thinking, hope for the future, sense of responsibility to family, friends, or pets, sense of purpose/belief that life has meaning, good rapport with therapist, doctor or other mental health providers and embedded in protective social network or family      Hopes and goals for the future: Pt has career and graduate school goals    Coping Skills: What helps and doesn't help? Pt reports that she gets support from her therapist, cat, , and takes her medications    Additional Risk Factors Related to Safety and Suicide: Yes: Depressive symptoms, Health stressors, Poor impulse control, Prior suicide attempt and Psychosis    The patient denies access to guns.     Is the patient engaged in self injurious behaviors? No     Risk to Others  Aggressive/Assaultive/Homicidal Risk Factors: No     Duty to Warn? No     Was a Child Protection Report Made? No       Was a Adult Protection Report Made? No        Sexually inappropriate behavior? No        Vulnerability to sexual exploitation? No     Additional information: n/a      3. Mental Health Symptoms and Substance Use  Current Symptoms and Mental Health History  GAIN Short Screener (GAIN-SS) administered: N/A    Attention, Hyperactivity, and Impulsivity Symptoms    Patient reported symptoms related to hyperactivity, inattention, or impulsivity? No      Anxiety Symptoms  Patient reported anxiety symptoms? Yes: Generalized Symptoms: Agitation, Cognitive anxiety - feelings of doom, racing thoughts, difficulty concentrating  and Excessive worry    Behavioral Difficulties  Patient reported behavioral difficulties? No     Mood Symptoms  Patient reported mood disorder symptoms? Yes: Feelings of hopelessness , Feelings of worthlessness , Impaired decision making , Increased irritability/agitation and Sleep disturbance      Eating Disorders and Appetite Disturbance    Patient reported appetite symptoms? Pt has a previous concern of eating disorder, documented as in remission from her psychiatrist    SCOFF  Do you make yourself sick (induce vomiting) because you feel uncomfortably full? No   Do you worry that you have lost Control over how much you eat? No  Have you recently lost more than 14 lb in a three-month period? No   Do you  think you are too fat, even though others say you are too thin? No   Would you say that food dominates your life? No  SCOFF Score: 0    Patient reported appetite or eating disorder symptoms? No    Interpersonal Functioning   Patient reported difficulties that may be associated with personality and interpersonal functioning? Yes: Impaired Impulse Control and Impaired Interpersonal Functioning =    Learning Disabilities/Cognitive/Developmental Disorders  Patient reported concerns related to learning disabilities, cognitive challenges, and/or developmental disorders? No     General Cognitive Impairments  Patient reported symptoms of cognitive impairments? No    Sleep Disturbance  Patient reported difficulties with sleep? Yes: Difficulty falling asleep  and Difficulty staying sleep      Psychosis Symptoms  Patient reported symptoms of psychosis? Yes: Hallucinations: Auditory and Paranoia     Trauma and Post-Traumatic Stress Disorder  Physical Abuse: No   Emotional/Psychological Abuse: No  Sexual Abuse: No  Loss of a friend or family member to suicide: No  Other Traumatic Event: No     Patient reported trauma related symptoms? No     Impact of Mental Health on Functioning    Negative Impact Score: 8/10   Subjective Impact on functioning: Pt reports that she has been feeling more down of late as well as sleeping less.  Pt reports that she doesn't notice the psychosis sx but reports if her  reports them, they are real  How do symptoms vary from baseline? Pt denies psychosis or suicidal thoughts at baseline    Current and Historical Substance Use Note  Is there a history of, or current, substance use? No     Have you been to chemical dependency treatment or detox before? No     CAGE-AID  Have you felt you ought to cut down on your drinking or drug use? No     Have people annoyed you by criticizing your drinking or drug use? No   Have you felt bad or guilty about your drinking or drug use? No  Have you ever had a  drink or used drugs first thing in the morning to steady your nerves or to get rid of a hangover? No   CAGE-AID Score: 0/4    Drug screen completed? No   BAL/Breathalyzer completed? No       Mental Status Exam  Affect: Blunted  Appearance: Appropriate   Attention Span/Concentration: Attentive    Eye Contact: Engaged  Fund of Knowledge: Appropriate   Language /Speech Content: Fluent  Language /Speech Volume: Soft   Language /Speech Rate/Productions: Minimally Responsive   Recent Memory: Variable  Remote Memory: Variable   Mood: Depressed and Sad   Orientation:   Person: Yes   Place: Yes  Time of Day: Yes   Date: Yes   Situation (Do they understand why they are here?): No   Psychomotor Behavior: Underactive   Thought Content: Paranoia and Suicidal  Thought Form: Flight of Ideas    4. Social and Environmental Conditions  Is the patient their own guardian? Yes     Living Situation: With others: with     Support system and quality of connections: PT reports connections with     Income source: Employment: Full time     Issues with employment or education: No    Legal Concerns  Do you have any history of or current involvement with the legal system? No    Spiritual and Cultural Influences  Do you have any Bahai beliefs that are important in your life? No     Do you have any cultural influences in your life that impact your mental health care? No      5. Psychiatric History, Medical History, and Current Care  Patient Mental Health Services  Does the patient have a history of mental health concerns/diagnoses? Yes Bipolar     Current Providers  Primary Care Provider: No   Psychiatrist: Yes [facility] [place]   Therapist: Yes Nicolle Soni   : No   ARMHS: No   ACT Team: No   Other: No    History of Commitment? No  History of Psychiatric Hospitalizations? Yes 2019, 2017   History of programmatic care? Yes [facility]    Family Mental Health History   Family History of Mental Health or Chemical Dependency Issues?  None reported    Development and Physical Health Challenges  Delays or concerns meeting developmental milestones? No  Current psychotropic medications? Yes Seroquel   Medication Compliant? Yes   Recent medication changes? Yes    History of concussion or TBI? No     Additional Information: n/a    6. Collateral Information and Collaboration  Collaboration with medical staff: Medical Records and Nursing     Collateral Information/Sources: Family: . and Community Provider: . as noted above    7. Assessment and Diagnosis  Assessment of patient strengths and vulnerabilities    Strengths, Protective Factors, & Community Resources: Pt is connected to providers, has supported family and friends    Patient skills, abilities, and coping skills (what is going well?): Pt has insight into her mental health    Patient vulnerabilities: Pt's psychosis    Diagnosis:    F31.2 Bipolar with psychotic features    8.Therapeutic Methodologies Utilized in Assessment  Psychotherapy techniques and/or interventions used: Establishing rapport, Active listening, Assess dimensions of crisis, Apply solution-focused therapy to address current crisis, Identify additional supports and alternative coping skills, Motivational Interviewing and Trauma-Informed Care    9. Patient Care/Treatment Plan  Summary of Patient Presentation and needs:  What are the basic needs for this patient in this moment? Inpatient    Consultations:  Attending provider consulted? Yes  Attending Name: Apolinar   Attending concurs with disposition? Yes     Recommended disposition: Inpatient Mental Health     Does the patient agree with the recommended level of care? Yes    Final disposition: Inpatient mental health     Disposition Details: Writer at the time of assessment recommends inpatient stabilization.  PT is in need of further eval and stabilization of her psychosis and suicidal ideation that led to her overdose today.    If Inpatient, is patient admitted voluntary? Yes    Patient aware of potential for transfer if there is not appropriate placement? Yes  Patient is willing to travel outside of the [facility] for placement? No   Central Intake Notified? Yes: Date: 8/20 Time: 1700.    Duration of face to face time with patient in minutes: 1.0 hrs    CPT code(s) utilized: 85968 - Psychotherapy for Crisis - 60 (30-74*) min    BROOKE Sterling on 8/20/2021 at 2:38 PM

## 2021-08-20 NOTE — ED NOTES
Fiorella Bartlett  August 20, 2021  SAFE Note    Critical Safety Issues: suicidal ideation, over dose      Current Suicidal Ideation/Self-Injurious Concerns/Methods: None - N/A      Current or Historical Inappropriate Sexual Behavior: No      Current or Historical Aggression/Homicidal Ideation: None - N/A      Triggers: stress school, work, less sleep    Updated care team: No: no knew information    For additional details see full LMHP assessment.       Gertrudis Crawford LPCC

## 2021-08-20 NOTE — ED PROVIDER NOTES
"[facility] Unit - Psychiatric Consultation  [facility] Emergency Department    Fiorella Bartlett MRN: [id number]   Age: 35 year old YOB: 1985     History     Chief Complaint   Patient presents with     Depression     Insomnia     HPI  Fiorella Bartlett is a 35 year old female with history notable for bipolar affective disorder, ED in remission, and DANDRE who was admitted to [facility] with psychotic symptoms and an overdose attempt on quetiapine this morning.   Pt reports two weeks with decreased sleep and increased stress due to work + taking a class. When asked how she has been feeling recently, pt states she has felt a little unsteady and has been \"jumping out of chairs.\" When asked to clarify, pt reports she abruptly jumped out of the wheelchair she was being transported to [facility] in and isn't sure why she did so. \"I don't know what happened this morning.\" Pt endorses taking quetiapine because she felt stressed. She thinks she took 1-15 tabs. Initially she states \"I don't know\" when asked if this was a suicide attempt. Pt later endorses experiencing command AH telling her to \"take the seroquel and a lot of it\" this morning. She endorses ongoing AH. She endorses SI during interview with plans to try to overdose again. She is concerned that if she went home, she might try to overdose.   Pt denies thought broadcasting. She endorses ideas of reference but doesn't elaborate past reporting that she occasionally gets messages from the TV especially for her. When asked about paranoia, pt states \"I don't think he's following me. He's my  and he's been taking care of me.\"     Pt is seen by Dr. Yi at [facility]. She was last seen on 8 June 2021. Pt has been stable on quetiapine 100mg at bedtime for several years (per pt). She was trialed on lurasidone several years ago but discontinued it because it made her feel depressed.    Past Medical History  Past Medical History:   Diagnosis Date     Depressive " "disorder      No past surgical history on file.  QUEtiapine (SEROQUEL) 200 MG tablet  cholecalciferol (VITAMIN D3) 5000 units TABS tablet  clonazePAM (KLONOPIN) 0.5 MG tablet  hydrOXYzine (ATARAX) 25 MG tablet  lurasidone (LATUDA) 80 MG TABS tablet      Allergies   Allergen Reactions     Pcn [Penicillins] Unknown     Has been told she was allergic since she was a child.      Family History  Family History   Problem Relation Age of Onset     Anxiety Disorder Sister      Social History   Social History     Tobacco Use     Smoking status: Never Smoker     Smokeless tobacco: Never Used   Substance Use Topics     Alcohol use: No     Drug use: No      Past medical history, past surgical history, medications, allergies, family history, and social history were reviewed with the patient. No additional pertinent items.       Review of Systems  A complete review of systems was performed with pertinent positives and negatives noted in the HPI, and all other systems negative.    Physical Examination   BP: 117/74  Pulse: (!) 121  Temp: 98  F (36.7  C)  Resp: 16  Height: 165.1 cm (5' 5\")  Weight: 63.5 kg (140 lb)  SpO2: 98 %    Physical Exam  General: Appears stated age.   Neuro: Alert and fully oriented. Extremities appear to demonstrate normal strength on visual inspection.   Integumentary/Skin: no rash visualized, normal color    Psychiatric Examination   Appearance: appeared as age stated, appears tired with dark circles under her eyes and casually dressed  Attitude:  cooperative  Eye Contact:  fair  Mood:  depressed  Affect:  mood congruent, intensity is blunted and nonreactive  Speech:  clear, coherent  Psychomotor Behavior:  no evidence of tardive dyskinesia, dystonia, or tics  Throught Process:  disorganized and evidence of thought blocking present  Associations:  no loose associations  Thought Content:  active suicidal ideation present, plan for suicide present and auditory hallucinations present  Insight:  " limited  Judgement:  limited  Oriented to:  time, person, and place  Attention Span and Concentration:  intact  Recent and Remote Memory:  intact  Language: able to name/identify objects without impairment  Fund of Knowledge: intact with awareness of current and past events    ED Course    Reviewed labs obtained by ED. Poison control requested INR, which is WNL.   EKG shows age undetermined septal infarct. QTc 438.  Pt medically cleared by ED MD prior to transfer to [facility].    Labs Ordered and Resulted from Time of ED Arrival Up to the Time of Departure from the ED   ACETAMINOPHEN LEVEL - Abnormal; Notable for the following components:       Result Value    Acetaminophen <2 (*)     All other components within normal limits   COVID-19 VIRUS (CORONAVIRUS) BY PCR - Normal    Narrative:     Testing was performed using the linda  SARS-CoV-2 & Influenza A/B Assay on the linda  Alyssa  System.  This test should be ordered for the detection of SARS-COV-2 in individuals who meet SARS-CoV-2 clinical and/or epidemiological criteria. Test performance is unknown in asymptomatic patients.  This test is for in vitro diagnostic use under the FDA EUA for laboratories certified under CLIA to perform moderate and/or high complexity testing. This test has not been FDA cleared or approved.  A negative test does not rule out the presence of PCR inhibitors in the specimen or target RNA in concentration below the limit of detection for the assay. The possibility of a false negative should be considered if the patient's recent exposure or clinical presentation suggests COVID-19.  [facility] are certified under the Clinical Laboratory Improvement Amendments of 1988 (CLIA-88) as qualified to perform moderate and/or high complexity laboratory testing.   HCG QUALITATIVE URINE - Normal   COMPREHENSIVE METABOLIC PANEL - Normal   DRUG ABUSE SCREEN 77 URINE (FL, RH, SH) - Normal   SALICYLATE LEVEL - Normal   INR - Normal   CBC  WITH PLATELETS & DIFFERENTIAL    Narrative:     The following orders were created for panel order CBC with platelets differential.  Procedure                               Abnormality         Status                     ---------                               -----------         ------                     CBC with platelets and d...[[id number]]                      Final result                 Please view results for these tests on the individual orders.   CBC WITH PLATELETS AND DIFFERENTIAL   URINE DRUGS OF ABUSE SCREEN    Narrative:     The following orders were created for panel order Urine Drugs of Abuse Screen.  Procedure                               Abnormality         Status                     ---------                               -----------         ------                     Drug abuse screen 77 uri...[[id number]]  Normal              Final result                 Please view results for these tests on the individual orders.       Assessments & Plan (with Medical Decision Making)   Patient presenting with what appears to be a bipolar depression with psychotic features. Nursing notes reviewed noting no acute issues. Given pt's command AH today and her endorsement of SI with plan and intent, recommend inpatient hospitalization for safety and stabilization. Pt agreeable.   Pt cleared by ED MD and poison control prior to transfer to [facility].    I have reviewed the assessment completed by the [facility].     Preliminary diagnosis:    ICD-10-CM    1. Suicidal ideation  R45.851    2. Bipolar disorder, current episode depressed, severe, without psychotic features (H)  F31.4    3. Bipolar disorder, current episode depressed, severe, with psychotic features (H)  F31.5         Treatment Plan:  - admit to in care     --  Nadja Jiang NP   [facility] EMERGENCY DEPT  [facility] Unit  8/20/2021      Nadja Jiang NP  08/20/21 2040

## 2021-08-20 NOTE — TELEPHONE ENCOUNTER
S: EMPATH calling with clinical information on a 36yo female Pt presenting to [facility] ED post overdose, SI and psychosis.     B: Pt BIB [facility] recommendation, family transported Pt to ED. Pt presenting suicidal, with attempted overdose. Pt stopped sleeping about a week ago, having increased paranoia. Pt reports that she overdosed on Seroquel, amount unknown, in response to command hallucinations. Pt presents with increased paranoia. Pt hx of bipolar w/psychosis. Pt presented two times in the past 6 years for IP MH with similar symptoms and decompensation. Pt Rx Seroquel. Pt endorses continued SI with plan to overdose. Pt presents disorganized in ED. Pt denies any acute medical concerns, none noted in Pt medical hx. Pt set up with therapist, psychiatrist, family is currently a support system as well. Pt lives with spouse at home. Pt experiencing increased psychosis, no psychosis usually present at baseline. Pt reports an increased amount of stress at school and at work. PT has been unable to attend work or school with this recent psychosis. Pt denies chemical dependency use or concerns. Poison control was contacted regarding overdose and Pt has been medically cleared of medical concerns regarding her ingestion.     A: Pt is voluntary for admission, COVID negative, Utox negative, labs insignificant     R: Patient cleared and ready for behavioral bed placement: yes  Identifying placement options   7:19 - on call resident accepts for placement to /[facility]  Unit and ED informed of disposition.

## 2021-08-20 NOTE — PROGRESS NOTES
Spoke with poison control representative. Advised to monitor for urinary retention, hallucinations, and tachycardia. ALT, AST, salicylate level, acetaminophen level, and INR all WNL from poison control stand point. Poison control representative stated that since patient took immediate release seroquel tablets, she should be past the worst stages of the effects by now.     Madeleine Miramontes RN

## 2021-08-20 NOTE — ED NOTES
Writer called Pt's  to update him per Pt's permission with POC.  He is aware that she is going inpatient and is agreeable to the plan.  He is requesting an update when an inpatient bed is located.

## 2021-08-20 NOTE — ED NOTES
Patient just reported she took 60 pills of Seroquel,  who is present in room, her does not believe she took that many medications.  She is slow in her responses, flat affect.  Started an IV, send blood and urine.

## 2021-08-21 PROBLEM — T65.92XA SUICIDE ATTEMPT BY SUBSTANCE OVERDOSE (H): Status: ACTIVE | Noted: 2021-08-21

## 2021-08-21 PROCEDURE — 250N000013 HC RX MED GY IP 250 OP 250 PS 637: Performed by: STUDENT IN AN ORGANIZED HEALTH CARE EDUCATION/TRAINING PROGRAM

## 2021-08-21 PROCEDURE — 124N000002 HC R&B MH UMMC

## 2021-08-21 PROCEDURE — 99223 1ST HOSP IP/OBS HIGH 75: CPT | Mod: AI | Performed by: PSYCHIATRY & NEUROLOGY

## 2021-08-21 RX ORDER — OLANZAPINE 10 MG/1
10 TABLET ORAL 3 TIMES DAILY PRN
Status: DISCONTINUED | OUTPATIENT
Start: 2021-08-21 | End: 2021-08-21

## 2021-08-21 RX ORDER — OLANZAPINE 10 MG/2ML
10 INJECTION, POWDER, FOR SOLUTION INTRAMUSCULAR 3 TIMES DAILY PRN
Status: DISCONTINUED | OUTPATIENT
Start: 2021-08-21 | End: 2021-08-30 | Stop reason: HOSPADM

## 2021-08-21 RX ORDER — AMOXICILLIN 250 MG
1 CAPSULE ORAL 2 TIMES DAILY PRN
Status: DISCONTINUED | OUTPATIENT
Start: 2021-08-21 | End: 2021-08-30 | Stop reason: HOSPADM

## 2021-08-21 RX ORDER — MAGNESIUM HYDROXIDE/ALUMINUM HYDROXICE/SIMETHICONE 120; 1200; 1200 MG/30ML; MG/30ML; MG/30ML
30 SUSPENSION ORAL EVERY 4 HOURS PRN
Status: DISCONTINUED | OUTPATIENT
Start: 2021-08-21 | End: 2021-08-30 | Stop reason: HOSPADM

## 2021-08-21 RX ORDER — OLANZAPINE 10 MG/1
10 TABLET ORAL 3 TIMES DAILY PRN
Status: DISCONTINUED | OUTPATIENT
Start: 2021-08-21 | End: 2021-08-30 | Stop reason: HOSPADM

## 2021-08-21 RX ORDER — QUETIAPINE FUMARATE 300 MG/1
300 TABLET, FILM COATED ORAL AT BEDTIME
Status: DISCONTINUED | OUTPATIENT
Start: 2021-08-21 | End: 2021-08-25

## 2021-08-21 RX ORDER — DIPHENHYDRAMINE HCL 25 MG
25 CAPSULE ORAL 4 TIMES DAILY PRN
Status: DISCONTINUED | OUTPATIENT
Start: 2021-08-21 | End: 2021-08-30 | Stop reason: HOSPADM

## 2021-08-21 RX ORDER — ACETAMINOPHEN 325 MG/1
650 TABLET ORAL EVERY 4 HOURS PRN
Status: DISCONTINUED | OUTPATIENT
Start: 2021-08-21 | End: 2021-08-30 | Stop reason: HOSPADM

## 2021-08-21 RX ORDER — OLANZAPINE 10 MG/2ML
10 INJECTION, POWDER, FOR SOLUTION INTRAMUSCULAR 3 TIMES DAILY PRN
Status: DISCONTINUED | OUTPATIENT
Start: 2021-08-21 | End: 2021-08-21

## 2021-08-21 RX ADMIN — DIPHENHYDRAMINE HYDROCHLORIDE 25 MG: 25 CAPSULE ORAL at 18:37

## 2021-08-21 RX ADMIN — QUETIAPINE 300 MG: 300 TABLET, FILM COATED ORAL at 21:25

## 2021-08-21 ASSESSMENT — ACTIVITIES OF DAILY LIVING (ADL)
TOILETING_ISSUES: NO
ORAL_HYGIENE: INDEPENDENT
DRESS: INDEPENDENT
DRESSING/BATHING_DIFFICULTY: NO
DOING_ERRANDS_INDEPENDENTLY_DIFFICULTY: YES
HEARING_DIFFICULTY_OR_DEAF: NO
WEAR_GLASSES_OR_BLIND: NO
CONCENTRATING,_REMEMBERING_OR_MAKING_DECISIONS_DIFFICULTY: YES
WHICH_OF_THE_ABOVE_FUNCTIONAL_RISKS_HAD_A_RECENT_ONSET_OR_CHANGE?: COMMUNICATION/SPEECH;COGNITION
COMMUNICATION: DIFFICULTY UNDERSTANDING
HYGIENE/GROOMING: HANDWASHING;SHOWER;INDEPENDENT
DRESS: SCRUBS (BEHAVIORAL HEALTH);INDEPENDENT
LAUNDRY: WITH SUPERVISION
LAUNDRY: WITH SUPERVISION
FALL_HISTORY_WITHIN_LAST_SIX_MONTHS: NO
PATIENT_/_FAMILY_COMMUNICATION_STYLE: SPOKEN LANGUAGE (ENGLISH OR BILINGUAL)
ORAL_HYGIENE: INDEPENDENT
DIFFICULTY_COMMUNICATING: YES
WALKING_OR_CLIMBING_STAIRS_DIFFICULTY: NO
DIFFICULTY_EATING/SWALLOWING: NO

## 2021-08-21 ASSESSMENT — MIFFLIN-ST. JEOR: SCORE: 1339.99

## 2021-08-21 NOTE — PHARMACY-ADMISSION MEDICATION HISTORY
Admission Medication History Completed by Pharmacy    See [facility] Admission Navigator for allergy information, preferred outpatient pharmacy, prior to admission medications and immunization status.     Medication history sources:  Surescripts dispense report; Patient via phone     Pertinent changes made to PTA medication list:  Added: N/A  Deleted:   - Clonazepam 0.5 mg tablets (per patient)   - Hydroxyzine 25 mg tablets (per patient)   - Lurasidone 80 mg tablet (per patient)   Changed: N/A    Additional medication history information:   - Patient was a moderate historian. She does not know the dose of the vitamin D supplement she takes.   - Patient denies taking any additional Rx/OTC medications other than the ones listed below.    Prior to Admission medications    Medication Sig Last Dose Taking? Auth Provider   Cholecalciferol (VITAMIN D3 PO) Take 1 tablet by mouth daily  Past Week  Yes Reported, Patient   QUEtiapine (SEROQUEL) 200 MG tablet Take 1 tablet (200 mg) by mouth At Bedtime 8/20/2021 Yes Shon Hills MD         Date completed: 08/21/21    Medication history completed by:   Carito Silver, PharmD  PGY-2 Pharmacy Resident - Psychiatry   [facility]  Emergency Department: Ascom *[id number]

## 2021-08-21 NOTE — PLAN OF CARE
Problem: Behavioral Health Plan of Care  Goal: Plan of Care Review  Recent Flowsheet Documentation  Taken 8/21/2021 0200 by Jaquelin Whittington, RN  Plan of Care Reviewed With: patient  Patient Agreement with Plan of Care: agrees   Pt slept 5 hours after late admission to unit,respirations easy

## 2021-08-21 NOTE — PROGRESS NOTES
Patient transferred to [place] [address] at 2300 via ambulance transport. All belongings that were brought into the hospital were returned to patient. Station [facility] notified that patient is enroute.    Madeleine Miramontes RN

## 2021-08-21 NOTE — PROGRESS NOTES
"CLINICAL NUTRITION SERVICES -BRIEF NOTE    Received consult for \"Pt prefers vegetarian diet\"     Implementation:  Modify composition of meals/snacks - Vegetarian options     Follow up/Monitoring:  RD to follow per protocol     Jaya Gonzales RDN, LD  Clinical Dietitian   Office: [phone]  Weekend Pager: [phone]             "

## 2021-08-21 NOTE — PLAN OF CARE
Nursing Plan of care   Problem: Behavior Regulation Impairment (Psychotic Signs/Symptoms)  Goal: Improved Behavioral Control (Psychotic Signs/Symptoms)  Outcome: No Change  Pt was isolative and withdrawn to her room; calm and pleasant but appeared depressed; rated depression 6/10 and anxiety 7/10; encourage pt to use identified coping skills;  offered and administered 25 mg benadryl PO and reported feeling better; had a visit and the visit went well; denied SI/SIB,AVH and HI; reported current stressors are caring for her cat, work and dysfunctional family; denied issue with sleep and stated she takes Seroquel for sleep, which is already scheduled for HS; took shower and groomed; medication compliant; will continue to monitor and assess.

## 2021-08-21 NOTE — PROGRESS NOTES
"SPIRITUAL HEALTH SERVICES  SPIRITUAL ASSESSMENT Progress Note  [facility] ([facility] [place]) [facility]NB     REFERRAL SOURCE: Consult    Fiorella was slow to speak during our conversation.  She said she knows why she's here.  She did ask if she can stay.  I said yes, for a while until you feel like you've got some footing under you again.     She said she's hesitant to talk with people because she doesn't want them to \"feel sorry for me.\"  When asked how she'd like people to respond she said, \"with empathy.\"  She struggled to name the difference, but they feel different to her.     Fiorella also stated she feels grounded when she's working in her garden.  We looked out the window at the flowers, trees and river together.  As we were a coyote walked by, which we watched together quietly.  She said she likes animals and that she has a cat.  She laughed slightly as she described this cat.    I shared with Fiorella how to reach a  in the future.    PLAN: [facility] remains available per pt/request.    Leni Villalba  Chaplain Resident  Pager: [phone]    "

## 2021-08-21 NOTE — PLAN OF CARE
"Pt is quite delayed, in her responses. She is \"sad,\" and almost becomes teary, when approached. She rates her depression and anxiety both 7/10. She appears confused/disorganized at times, but is able to respond approp. She also mentioned wanting to go home. Dr Fairchild said she may be discharging pt tomorrow, but that pt cannot contract for safety, just yet. Pt denies si/sib thoughts, when assessed this am, by this RN. Will give pt a med for anxiety, when Dr orders this-resident would like to talk to attending, first. Support/reassurance given to pt. See PCS for shift asmnt.  "

## 2021-08-21 NOTE — PLAN OF CARE
Problem: Behavioral Health Plan of Care  Goal: Plan of Care Review  Recent Flowsheet Documentation  Taken 8/21/2021 0200 by Jaquelin Whittington RN  Plan of Care Reviewed With: patient  Patient Agreement with Plan of Care: agrees   35 year old woman received per transport from empath program at [facility],voluntary status.,pt had an overdose of seroquel this am which was interupted by her spouse,she admits that this was a suicide attempt and that she was experiencing command auditoty hallucinations and wanted to die.,she exhibits paranoia,disorganized thought process,and passive si,she contracts for safety,she has been sleeping poorly and feels overwhelmed by increased stress at work as well as school.pt has a hx of bipolardisorder with psychosis and has presented twice in the past 6 years  with similar sx and decompensation,pt medically cleared through poison control ,this was her second interupted od,she admits to sib at times which consists of picking at her back upper areas ,she has been placed on si and sib precautions ahe appears to be sleeping peacefully at this time,defer addotional admission assessment to team

## 2021-08-21 NOTE — PROGRESS NOTES
"Patient working on puzzle at table near nursing station. Abruptly stood up and grabbed another puzzle and sat on the floor and started taking puzzle pieces out of the box and stated \"I won't work on her puzzle.\" Writer asked patient if she would like to do the puzzle at another table. Patient was agreeable to this. Writer offered patient PRN medication, patient declined. Will reassess.    Madeleine Miramontes RN    "

## 2021-08-21 NOTE — H&P
"Psychiatry History and Physical    Fiorella Bartlett MRN# [id number]   Age: 35 year old YOB: 1985     Date of Admission:  8/20/2021  Admitting Physician:  Kam Snell MD          Contacts:     Primary Outpatient Psychiatrist: Dr. Yi at [facility]. She was last seen on 8 June 2021  Primary Physician: No Ref-Primary, Physician  Therapist: Clotilde Soni.  : No   ARMHS: No   ACT Team: No   Other: No  Family Members: From Daniel () [phone]         Chief Complaint:     \"Suicidal ideation\"         History of Present Illness:     History obtained from patient and electronic chart    Fiorella Bartlett is a 35 year old  female with a past psychiatric history of Bipolar Type 1 admitted from the  ER on 08/21/2021 due to concern for s/p suicide attempt in the context of increased stress at work  Resulting in insomnia. Stressors including chronic mental health issues and workplace problems.     Per ED Note:   \"35 year old woman received per transport from [facility],voluntary status.,pt had an overdose of seroquel this am which was interupted by her spouse,she admits that this was a suicide attempt and that she was experiencing command auditoty hallucinations and wanted to die.,she exhibits paranoia,disorganized thought process,and passive si,she contracts for safety,she has been sleeping poorly and feels overwhelmed by increased stress at work as well as school.pt has a hx of bipolardisorder with psychosis and has presented twice in the past 6 years  with similar sx and decompensation\"    She was medically cleared for admission to inpatient psychiatric unit.    Per Family Report:  Daniel on 8/20/21 per Dec assesment  \"He states that as of Wednesday 8/18/21 her psychosis and paranoia become more overt.  He states that she was more confused, disorganized, and paranoid.  He states that she would stand at the windows telling him that her boss " "is outside.  He states that she impulsively wanted to quit her job.  He states that she has been laying in bed, touching her face, waking up and seemingly disorganized.  He states that today she was much worse and grabbed several bottles of pills and alcohol.  He states that its possible she took some Seroquel.  \"    ED/Hospital Course   Pt medically cleared through poison control ,this was her second interupted od,she admits to sib at times which consists of picking at her back upper areas ,she has been placed on si and sib precautions zak appears to be sleeping peacefully at this time,defer addotional admission assessment to team      Per patient report:    PT reports that over the last week-two weeks she has been having more difficultly with stress and sleep.  Pt reports that the promotion and class have been difficult for her.  PT also reports stress of taking their cat to the emergency vet.  PT reports that concurrently she has been losing sleep. She was last seen by her outpatient psychiatric provider  on 8 June 2021. Fiorella reported that she \"just does not want to end up here again\". She said that she feels embarrassment about her diagnosis and that everyone knows at the workplace.     She reported her mood as currently depressed, though over the past many days or few weeks, it was mixed with elements of elevated, irritable and depressed mood.  She acknowledged difficulty focusing and difficulty organizing her thoughts. During this interview, she didn't speak of command auditory hallucinations (as was noted in ER report).    She remained quiet when asked about SI to this writer. She was unable to volunteer a safety plan at this time. She denied any thought to hurt or kill anyone else at any time.    Her primary goal for this hospitalization is to discharge and not come back.         The risks, benefits, alternatives and side effects have been discussed and are understood by the patient and other " "caregivers.         Psychiatric Review of Systems:   Endorsed confusion, sleep disturbance, depression and SI.  PT reports increased anxiety.  Pt denies any panic attacks.  Pt denies any sx of psychosis.  Pt denies any paranoia.  Writer challenged her on this and the reports of her  and she stated \"it's likely happening then\". (see collateral for more).  Pt appears to have some disorganization and asked for questions to be repeated.  PT is not currently overtly psychotic (other than some mild cognitive disorganization), manuela, or delusional during the assessment.    Depression:  suicidal ideation, depressed mood, low energy, insomnia, poor concentration /memory, feeling worthless, excessive guilt, feeling hopeless, feeling trapped and excessive crying  Elevated:  none  Psychosis: report of recent auditory hallucinations and paranoia, along with recent and current cognitive disorganization  Anxiety:  nervous/overwhelmed  Panic Attack:  none  Trauma Related:  none  Dysregulation:  suicidal ideation  Eating Disorder: unknown          Medical Review of Systems:     The Review of Systems is negative other than what is noted in the HPI    Endorsed headache         Psychiatric History:     Prior diagnoses: Bipolar Disorder, with psychosis during acute severe mood episodes (initially diagnosed during inpatient psychiatric treatment at [facility] 3/24/2019, previously MDD, with history of severe episodes including psychosis - first in 2015), DANDRE.    Hospitalizations: Pt's last hospitalization was in 2019 where she had back to back hospitalizations with the last being 2 weeks.  She states that she was placed and then removed from Latuda and placed on Seroquel. Initial inpatient psychiatric treatment was 2015, with discharge diagnosis of psychotic depression (see notation below from Dr. Yi's 5/2019 psychiatry note - via [facility]).    Court Committments: No.    Suicide attempts: Pt reports previous suicide " "attempt in 2015 where she was psychotic and overdosed on Tylenol and Vodka. Possible quetiapine overdose 8/20/2021.    Self-injurious behavior: PT reports a hx of self harm as a teenager.     Guns: no No.    Violence: No.    ECT: No.     TMS: No.    Current outpatient psychiatric medications:  Per 6/8/2021 Psychiatry Progress Note (via [facility]) by Gustavo Keene MD:  \"Bipolar 1 disorder (euthymic currently)  Pt reports mood has been stable. Her sleep is stable, although reports 1-2 nights in past month with insomnia. Making an effort to make sure she stops work in a timely fashion and that helps her to unwind. She is enjoying post covid Socializing. Considering pros and cons of pregnancy and how that would affect her mental health. Pt will continue Seroquel 100 mg po qhs.\"       Past medications:   - Seroquel  - Latuda (this was not a recently active medication - per 8/20/2021 [facility] Unit - Psychiatric Consultation note by WENDY Jiang NP: \"Pt has been stable on quetiapine 100mg at bedtime for several years (per pt). She was trialed on lurasidone several years ago but discontinued it because it made her feel depressed.\")  - Hydroxyzine   - Klonopin (of note [facility] database search yielded no record of controlled substance prescriptions for Fiorella Bartlett or Jenna Bartlett 1985 for 8/21/20 to 8/21/2021)    As per Dr Yi note on 5/2019.  \"Pt seen for an initial evaluation in December of 2015. she had been hospitalized with a psychotic depression. She was followed closely, and attempted to stop medication over the next couple of years but when stressed at work had a second episode and was hospitalized a second time.Pt was seen on 3/10/17 after being discharged from the hospital in February, she was admitted with psychosis and depression. In April she reamained stable    Seen for follow up on 5/8/17. Reluctantly agreed to continue current medication and working with therapist.  Seen for " follow up on6/19/17. Appeared better, Geodon reduced to 20 mg qd and Lexapro 10 mg continued. Therapy encouraged.Seen for follow up on 7/31/17. Appeared well We agreed that Geodona and Lexapro should not change untill back from [facility]  Seen for follow up on 11/27/17. She was very stable, so Geodon was stopped and Lexapro continued. she was enouraged to use Trazodone if necessary for insomni and to call with any concerns.  Seen for follow up on 1/15/18. She reported feeling very stable and denied concerns. Lexapro was continued, as well as trazodone prn insomnia.  Seen for follow up on 5/7/18. she was doing well, had used a light box over the winter with improvement in low mood and fatigue. Lexapro was contiued, as well as trazodone prn insomnia.  Seen for follow up on 9/17/18. she had noted some symptoms when stressed at work this summer, but made an effeort to work at self care and they reolved. States it was a wake up call for her to stay on medications. Doing well and agrees to start her light box again now. No medication changes.  Seen for follow up in December, she was doing well and no changes were made  In February  called and reported pt was suffering psychotic symptoms and seemed confused. He agreed to take her to [facility].   3/18/19. Recoviering from third episode of depresion and anxiety with psychosis. lexapro increased to 20 mg seroquel to use 25 mg po qhs prn.  Seen for follow up on   4/15/19. she had been hospitalized agian at [facility] on 3/24 and recieved diagnosis of bipolar disorder. this was discussed at length. clonaepam 0.5 mg po q noon and 6 pm started for akathesia. Latuda and seroquel continued.  Seen for follow up on 4/24/19. Very tired, doing bit better. Still sad and worried about diagnosis. \Reduced Seroquel to 150 mg  Today patient states that she is taking Latuda and clonazepam at noon. She tries to avoid taking the second clonazepam but she is having some restlessness in  "the evenings, She is going to [facility] three times a week. Hopes to start work agin part time next week and full time the week after. At that time she will be finished with [facility]. She is still struggling a lot with sadness. \"It comes and goes.\" Still very sad about the diagnosis and fears of developing psychosis. It is most frightening to her that it seems so out of her control. She is also afraid of taking on too many things because she correlates her episodes of psychosis with increased stress. She reports that anxiety is the most persistent symptoms she struggles with. She is concerned that Latuda makes anxiety worse. She has been reluctant to discuss very much in group therapy however. She is still sleeping a lot and also napping. The moring sedation is a little better on the lower Seroquel, and she does not think there have been any negative effects of reducing the Seroquel. \"           Substance Use History:     Alcohol: 2 drinks per week.    Nicotine: No    THC: No    Illicit Substances: No    Chemical Dependency Treatment:  No         Social History:     Upbringing: [place]. 2009 moved to [place] for work. It was difficult to find a job.     Family/Relationships:  for 8 years.    Living Situation: she lives at home with her  and karlee Pimentel.    Education: Some grad school.     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      Legal: underage drinking high school    Abuse/Trauma:  Denies history of trauma      Service: None    Spirituality: No    Hobbies/Interests: Gardening, reading.         Past Medical History:   Denies history of: hepatitis, HIV, head trauma with or without loss of consciousness and seizures    Past Medical History:   Diagnosis Date     Depressive disorder      No past surgical history on file.       Allergies:      Allergies   Allergen Reactions     Pcn [Penicillins] Unknown " "    Has been told she was allergic since she was a child.           Medications:     No current outpatient medications on file.      PTA medication:  - Quetiapine, per Fiorella's report [though Dr. Yi's 6/8/2021 note doesn't seem to include a current medication list - in [facility]Everywhere] was 100mg po at bedtime until Dr. Yi increased the dose to 150mg po at bedtime this past week - however, Fiorella reported having taken quetiapine 200mg po at bedtime on her own accord since 8/16/2021 (up through 8/19/2021).      [Note that though the 8/20/2021 [facility] ED note by MANDO Easton MD, included:   \"Medications:  Seroquel   Latuda   Hydroxyzine   Klonopin\"   That appears to have been an erroneous list, as:  per 8/20/2021 [facility] Unit - Psychiatric Consultation note by WENDY Jiang NP, Latuda was noted as not an active medication, \"Pt has been stable on quetiapine 100mg at bedtime for several years (per pt). She was trialed on lurasidone several years ago but discontinued it because it made her feel depressed.\").  Additionally, Klonopin does not seem to be an active medication, nor over the past year - as [facility] database search yielded no record of controlled substance prescriptions for Fiorella Bartlett or Jenna Bartlett 1985 for 8/21/20 to 8/21/2021.  Gustavo Keene MD's 6/8/2021 Psychiatry follow-up note only listed quetiapine 100mg po qday as an active medication.  Fiorella reported on 8/21/2021, that her only psychiatric medication was quetiapine.]           Family History:   Family History:    Sister: anxiety  Father: prostate cancer, hypertension, hyperlipidemia  Mother: hypertension, thyroid disorder   Maternal grandfather: diabetes  Maternal grandmother: cancer, cataracts  Paternal grandfather: heart disease, hyperlipidemia   Family History   Problem Relation Age of Onset     Anxiety Disorder Sister             Labs:     Recent Results (from the past 24 hour(s))   Asymptomatic " COVID-19 Virus (Coronavirus) by PCR Nasopharyngeal    Collection Time: 08/20/21 11:56 AM    Specimen: Nasopharyngeal; Swab   Result Value Ref Range    SARS CoV2 PCR Negative Negative   EKG 12-lead, tracing only    Collection Time: 08/20/21 12:35 PM   Result Value Ref Range    Systolic Blood Pressure  mmHg    Diastolic Blood Pressure  mmHg    Ventricular Rate 110 BPM    Atrial Rate 110 BPM    AL Interval 152 ms    QRS Duration 62 ms     ms    QTc 438 ms    P Axis 71 degrees    R AXIS 78 degrees    T Axis 43 degrees    Interpretation ECG       Sinus tachycardia  Septal infarct , age undetermined  Abnormal ECG  No previous ECGs available  Confirmed by GENERATED REPORT, COMPUTER ([id number]),  Aasen, Bradley ([id number]) on 8/20/2021 12:42:27 PM     Drug abuse screen 77 urine (FL, RH, SH)    Collection Time: 08/20/21  2:16 PM   Result Value Ref Range    Amphetamines Urine Screen Negative Screen Negative    Barbiturates Urine Screen Negative Screen Negative    Benzodiazepines Urine Screen Negative Screen Negative    Cannabinoids Urine Screen Negative Screen Negative    Cocaine Urine Screen Negative Screen Negative    Opiates Urine Screen Negative Screen Negative    PCP Urine Screen Negative Screen Negative   HCG qualitative urine    Collection Time: 08/20/21  2:17 PM   Result Value Ref Range    hCG Urine Qualitative Negative Negative   Comprehensive metabolic panel    Collection Time: 08/20/21  2:17 PM   Result Value Ref Range    Sodium 138 133 - 144 mmol/L    Potassium 3.7 3.4 - 5.3 mmol/L    Chloride 108 94 - 109 mmol/L    Carbon Dioxide (CO2) 25 20 - 32 mmol/L    Anion Gap 5 3 - 14 mmol/L    Urea Nitrogen 9 7 - 30 mg/dL    Creatinine 0.77 0.52 - 1.04 mg/dL    Calcium 9.0 8.5 - 10.1 mg/dL    Glucose 96 70 - 99 mg/dL    Alkaline Phosphatase 44 40 - 150 U/L    AST 15 0 - 45 U/L    ALT 22 0 - 50 U/L    Protein Total 7.6 6.8 - 8.8 g/dL    Albumin 4.3 3.4 - 5.0 g/dL    Bilirubin Total 0.7 0.2 - 1.3 mg/dL    GFR Estimate >90  ">60 mL/min/1.73m2   Acetaminophen level    Collection Time: 08/20/21  2:17 PM   Result Value Ref Range    Acetaminophen <2 (L) 10 - 30 mg/L   CBC with platelets and differential    Collection Time: 08/20/21  2:17 PM   Result Value Ref Range    WBC Count 8.2 4.0 - 11.0 10e3/uL    RBC Count 4.83 3.80 - 5.20 10e6/uL    Hemoglobin 14.0 11.7 - 15.7 g/dL    Hematocrit 42.3 35.0 - 47.0 %    MCV 88 78 - 100 fL    MCH 29.0 26.5 - 33.0 pg    MCHC 33.1 31.5 - 36.5 g/dL    RDW 12.2 10.0 - 15.0 %    Platelet Count 240 150 - 450 10e3/uL    % Neutrophils 72 %    % Lymphocytes 22 %    % Monocytes 5 %    % Eosinophils 0 %    % Basophils 1 %    % Immature Granulocytes 0 %    NRBCs per 100 WBC 0 <1 /100    Absolute Neutrophils 5.9 1.6 - 8.3 10e3/uL    Absolute Lymphocytes 1.8 0.8 - 5.3 10e3/uL    Absolute Monocytes 0.4 0.0 - 1.3 10e3/uL    Absolute Eosinophils 0.0 0.0 - 0.7 10e3/uL    Absolute Basophils 0.0 0.0 - 0.2 10e3/uL    Absolute Immature Granulocytes 0.0 <=0.0 10e3/uL    Absolute NRBCs 0.0 10e3/uL   Salicylate level    Collection Time: 08/20/21  2:17 PM   Result Value Ref Range    Salicylate <2 <20 mg/dL   INR    Collection Time: 08/20/21  4:06 PM   Result Value Ref Range    INR 1.02 0.85 - 1.15          Psychiatric Examination:   /83   Pulse 93   Temp (!) 96.2  F (35.7  C) (Oral)   Resp 16   Ht 1.651 m (5' 5\")   Wt 64.4 kg (142 lb)   SpO2 96%   BMI 23.63 kg/m      Appearance:  awake, alert, dressed in hospital scrubs and appeared older than stated age  Attitude:  cooperative  Eye Contact:  poor   Mood:  mixed, depressed, euphoric, sad iritable  Affect:  mood congruent, constricted mobility and restricted range  Speech:  decreased prosody, paucity of speech and slow and monotone  Psychomotor Behavior:  no evidence of tardive dyskinesia, dystonia, or tics  Thought Process:  disorganized at times, lose chain of thoughts  Associations:  no loose associations  Thought Content:  passive suicidal ideation present, no " "auditory hallucinations present and no visual hallucinations present  Insight:  fair  Judgment:  fair  Oriented to:  time, person, and place  Attention Span and Concentration:  limited  Recent and Remote Memory:  limited  Language:  english with appropriate syntax and vocabulary  Fund of Knowledge: appropriate  Muscle Strength and Tone: normal  Gait and Station: Normal         Physical Exam:     See ED assessment note by ED physician.        Assessment   Fiorella Bartlett is a 35 year old female with history of depression (including psychotic depression dating back to 2015), bipolar I disorder, sleep deprivation, eating disorder, and brief reactive psychosis who presents s/p suicide attempt overdosing seroquel, intent not clear.  About 2 weeks ago Fiorella started experiencing insomnia, last week she reached out to her outpatient provider. The dose of Seroqeul was increased by her psychiatrist this past week to 150 but Fiorella had been taking 200 mg for a few days prior to that and continued at that higher-than-prescribed dose subsequent to psychiatrist formally increasing the dose to 150mg po qhs. At 1000 8/2021,  found her upstairs with her Seroquel prescription in her hand. Unclear how many pills Fiorella took but she reported that she was having command AH to take more pills at that time. In the ED 8/20/21, Fiorella reported she has been experiencing suicidal thoughts and depression over this last week along with trouble sleeping.  She also reported experiencing a headache, present for a few days now, and feeling confused. On interview 8/21/21, Fiorella was tearful, endorsed feelings of embarrassment and disappointment, as well as feeling hopeless. MSE was notable for depressed affect, slow quiet speech. Psychomotor retardation and reported delusions, ideas of references. Mood was endorsed as  \"mixed\". At this time, was unable to volunteer a safety plan. Recommend continuing hospitalization for stabilization.  " Optimization of medications to target these symptom clusters in addition to evaluation of adquate outpatient supports will be targets for treatment during this admission.     Given that she currently has severe depressive (or mixed mood) episode with psychotic features, patient warrants inpatient psychiatric hospitalization to maintain her safety. Disposition pending clinical stabilization, medication optimization and development of an appropriate discharge plan.    Protective Factors: future-oriented thinking, hope for the future, sense of responsibility to family, friends, or pets, sense of purpose/belief that life has meaning, good rapport with therapist, doctor or other mental health providers and embedded in protective social network or family      Hopes and goals for the future: Pt has career and graduate school goals     Coping Skills: What helps and doesn't help? Pt reports that she gets support from her therapist, cat, , and takes her medications     Additional Risk Factors Related to Safety and Suicide: Yes: Depressive symptoms, Health stressors, Poor impulse control, Prior suicide attempt and Psychosis     The patient denies access to guns.     Principal psychiatric diagnosis:   - Bipolar Depression, Type 1, currently mixed episode, severe with psychotic features. S/p suicidal behavior with overdose severe, recurrent, with psychotic features    Secondary psychiatric diagnoses:   - DANDRE          Plan     Admit to Unit 22  with Attending Physician Dr. Channing M.D.    Medications:   Outpatient medications held:     The following medications were likely not active/recent medications (see Medications section above for details) and they were not started at this time:  Latuda   Hydroxyzine   Klonopin     Outpatient medications continued:   Serobella Barros was in agreement to increase Seroquel to 300 mg po at bedtime at this time, with a plan to titrate up to 400 mg po at bedtime on Monday 8/23/21 [We  "discussed the potential benefits, risks and side effect profile with her.]      New medications initiated:   none    Hospital PRNs as ordered:  acetaminophen, alum & mag hydroxide-simethicone, OLANZapine **OR** OLANZapine, senna-docusate        -Olanzapine 10 mg PO/IM prn Q2H severe agitation/psychosis  -Hydroxyzine 25-50 mg Q6H PRN for anxiety  -Tylenol 650 mg Q6H PRN for pain and fever  -Mylanta 30 ml Q4H PRN for indigestion    Medications: risks/benefits discussed with patient    Patient will be treated in therapeutic milieu with appropriate individual and group therapies.    Laboratory/Imaging:  ECG:  Per 8/20/2021 [facility] ED note by MANDO Easton MD:  \"ECG taken at 1235, ECG read at 1237  Sinus tachycardia  Septal infarct, age underterminewd  Abnormal ECG  Rate 110 bpm. MN interval 152 ms. QRS duration 62 ms. QT/QTc 324/438 ms. P-R-T axes 71 78 43.\"     Laboratory:  CBC: WBC 8.2, HGB 14.0,       Asymptomatic COVID19 Virus PCR by nasopharyngeal swab: negative     HCG qualitative urine: negative     CMP:  o/w WNL (Creatinine 0.77)      Acetaminophen Level: <2 (L)     Drug abuse screen: all negative    Legal Status:   Orders Placed This Encounter      Voluntary      Safety Assessment:    Behavioral Orders   Procedures     Code 1 - Restrict to Unit     Routine Programming     As clinically indicated     Self Injury Precaution     Status 15     Every 15 minutes.     Suicide precautions     Patients on Suicide Precautions should have a Combination Diet ordered that includes a Diet selection(s) AND a Behavioral Tray selection for Safe Tray - with utensils, or Safe Tray - NO utensils        Pt has not required locked seclusion or restraints in the past 24 hours to maintain safety, please refer to RN documentation for further details.    Consults:  - none    Medical diagnoses to be addressed this admission:     #. none         Dispo: unknown pending medication management and clinical " stabilization        This patient was seen and discussed with my attending physician.  LATRICE Fairchild MD, MSc  PGY-2 Psychiatry Resident Physician      Psychiatry Attending Attestation:    Attestation:  I, Kam Snell MD, have personally performed an examination of this patient 8/21/2021, along with discussing treatment plan with Fiorella and Dr. Fairchild.  I have reviewed the resident's documentation.  I have edited the note to reflect all relevant changes.   I agree with resident findings and plan in this resident H&P.  I have reviewed all vitals and laboratory findings.    Kam Snell MD

## 2021-08-21 NOTE — PROGRESS NOTES
Patient observed pacing the unit and crying. Writer approached patient, but patient waved writer away. A few minutes later, patient approached nursing station, appeared anxious and distressed. Offered PRN ativan, patient agreeable to take.     Madeleine Miramontes RN

## 2021-08-21 NOTE — PLAN OF CARE
"Initial Psychosocial Assessment    I have reviewed the chart, met with the patient, and developed Care Plan.  Information for assessment was obtained from:   Chart review and interview with Pt, who appeared teary eyed, exhausted and expressed feeling \"very tired of talking\" after meeting with the psychiatrists. Writer and Pt met briefly.       Presenting Problem:  Per RN Admission Note:  35 year old woman received per transport from [facility] [employer] [employer] program at [facility],voluntary status.,pt had an overdose of seroquel this am which was interupted by her spouse,she admits that this was a suicide attempt and that she was experiencing command auditoty hallucinations and wanted to die.,she exhibits paranoia,disorganized thought process,and passive si,she contracts for safety,she has been sleeping poorly and feels overwhelmed by increased stress at work as well as school.pt has a hx of bipolardisorder with psychosis and has presented twice in the past 6 years  with similar sx and decompensation,pt medically cleared through poison control ,this was her second interupted od,she admits to sib at times which consists of picking at her back upper areas ,she has been placed on si and sib precautions ahe appears to be sleeping peacefully at this time,defer addotional admission assessment to team    History of Mental Health and Chemical Dependency:  Pt has history of bipolar disorder with psychosis and eating disorder (in remission). Pt's last psychiatric hospitalization was in 2019.     Family Description (Constellation, Family Psychiatric History):  Pt has been  for 12 years, no kids.     Significant Life Events (Illness, Abuse, Trauma, Death):  Denies     Living Situation:  Lives with her      Educational Background:  Graduated college     Occupational History:  Employed full-time     Financial Status:  Employed     Legal Issues:  None    Ethnic/Cultural Issues:   female     Spiritual " Orientation:  None      Service History:  None     Social Functioning (organization, interests):  Interests: reading, making soap, walking, going to [place]   Supports: therapist, cat, , and takes her medications    Current Treatment Providers are:  Psychiatrist: Dr. Kd DO at [facility]   Therapist: BROOKE Allen at [facility] Assessment/Plan:  Patient has been admitted for psychiatric stabilization due to symptoms of suicide attempt by overdose on prescription medications and recent increased symptoms of psychosis. Patient will have psychiatric assessment and medication management by the psychiatrist. Medications will be reviewed and adjusted per MD as indicated. The treatment team will continue to assess and stabilize the patient's mental health symptoms with the use of medications and therapeutic programming. Hospital staff will provide a safe environment and a therapeutic milieu. Staff will continue to assess patient as needed. Patient will be encouraged to participate in unit groups and activities. Patient will receive individual and group support on the unit.  CTC will do individual inpatient treatment planning and after care planning. CTC will discuss options for increasing community supports with the patient. CTC will coordinate with outpatient providers and will place referrals to ensure appropriate follow up care is in place.

## 2021-08-21 NOTE — PROGRESS NOTES
08/21/21 0036   Patient Belongings   Did you bring any home meds/supplements to the hospital?  No   Patient Belongings locker   Patient Belongings Put in Hospital Secure Location (Security or Locker, etc.) shoes;clothing   Belongings Search Yes   Clothing Search Yes   Second Staff Jamal LEOS and Karen Lyn ass.   Comment shoes and clothing in the locker/ nothing to the safe   A               Items brought in by pt  8/21: underwear x5; tshirts x2; bra x2; jeans; athletic pants; shorts; canvas bag    Admission:  I am responsible for any personal items that are not sent to the safe or pharmacy.  [facility] is not responsible for loss, theft or damage of any property in my possession.    Signature:  _________________________________ Date: _______  Time: _____                                              Staff Signature:  ____________________________ Date: ________  Time: _____      2nd Staff person, if patient is unable/unwilling to sign:    Signature: ________________________________ Date: ________  Time: _____     Discharge:  [facility] has returned all of my personal belongings:    Signature: _________________________________ Date: ________  Time: _____                                          Staff Signature:  ____________________________ Date: ________  Time: _____

## 2021-08-22 PROCEDURE — 250N000013 HC RX MED GY IP 250 OP 250 PS 637: Performed by: STUDENT IN AN ORGANIZED HEALTH CARE EDUCATION/TRAINING PROGRAM

## 2021-08-22 PROCEDURE — 124N000002 HC R&B MH UMMC

## 2021-08-22 RX ADMIN — QUETIAPINE 300 MG: 300 TABLET, FILM COATED ORAL at 21:43

## 2021-08-22 RX ADMIN — DIPHENHYDRAMINE HYDROCHLORIDE 25 MG: 25 CAPSULE ORAL at 21:05

## 2021-08-22 NOTE — PLAN OF CARE
Problem: Sleep Disturbance (Psychotic Signs/Symptoms)  Goal: Improved Sleep (Psychotic Signs/Symptoms)  Flowsheets (Taken 8/22/2021 0653)  Mutually Determined Action Steps (Improved Sleep): sleeps 4-6 hours at night     Problem: Sleep Disturbance (Psychotic Signs/Symptoms)  Goal: Improved Sleep (Psychotic Signs/Symptoms)  Flowsheets (Taken 8/22/2021 0653)  Mutually Determined Action Steps (Improved Sleep): sleeps 4-6 hours at night   Pt slept 7 hours this night,weighted blsnket in place and seemed to enhance sleep pattern

## 2021-08-22 NOTE — PLAN OF CARE
Problem: Behavioral Health Plan of Care  Goal: Plan of Care Review  Outcome: No Change    Pt is isolative to room all day with the exception of visiting with her .  Pt admits to feeling very depressed but refused to talk about it with writer attempting a few different times.  Pt states her visit with her  went well.  Pt denies any SI/SIB or hearing any voices.  Pt is very quiet when speaking with her, flat and sad.  Pt is withdrawn.

## 2021-08-23 PROCEDURE — 250N000013 HC RX MED GY IP 250 OP 250 PS 637

## 2021-08-23 PROCEDURE — 99231 SBSQ HOSP IP/OBS SF/LOW 25: CPT | Mod: GC | Performed by: PSYCHIATRY & NEUROLOGY

## 2021-08-23 PROCEDURE — 124N000002 HC R&B MH UMMC

## 2021-08-23 PROCEDURE — 250N000013 HC RX MED GY IP 250 OP 250 PS 637: Performed by: STUDENT IN AN ORGANIZED HEALTH CARE EDUCATION/TRAINING PROGRAM

## 2021-08-23 PROCEDURE — G0177 OPPS/PHP; TRAIN & EDUC SERV: HCPCS

## 2021-08-23 RX ORDER — LITHIUM CARBONATE 600 MG/1
600 CAPSULE ORAL
Status: DISCONTINUED | OUTPATIENT
Start: 2021-08-23 | End: 2021-08-24

## 2021-08-23 RX ADMIN — QUETIAPINE 300 MG: 300 TABLET, FILM COATED ORAL at 22:22

## 2021-08-23 RX ADMIN — LITHIUM CARBONATE 600 MG: 600 CAPSULE ORAL at 18:58

## 2021-08-23 ASSESSMENT — ACTIVITIES OF DAILY LIVING (ADL)
HYGIENE/GROOMING: INDEPENDENT
LAUNDRY: WITH SUPERVISION
DRESS: INDEPENDENT
LAUNDRY: WITH SUPERVISION
HYGIENE/GROOMING: INDEPENDENT
ORAL_HYGIENE: INDEPENDENT
ORAL_HYGIENE: INDEPENDENT
DRESS: INDEPENDENT

## 2021-08-23 NOTE — PLAN OF CARE
Pt attended 1 of 3 OT groups today. Pt participated in OT clinic with encouragement, where she initiated a chosen project (doing origami), followed through with plan, and asked for support with supplies as needed. Pt presents with restricted affect and is minimally social with peers and staff. Provided pt with OT self-assessment, however, pt declined to complete at this time.

## 2021-08-23 NOTE — PLAN OF CARE
BEHAVIORAL TEAM DISCUSSION    Participants: Dr. Sanchez, Dr. Liu, RN Digna Rincon, CTC Deanna Rascon  Progress: new admission  Anticipated length of stay: 4-5 days  Continued Stay Criteria/Rationale: needs stabilization  Medical/Physical: see IM consult  Precautions:   Behavioral Orders   Procedures     Code 1 - Restrict to Unit     Routine Programming     As clinically indicated     Self Injury Precaution     Status 15     Every 15 minutes.     Suicide precautions     Patients on Suicide Precautions should have a Combination Diet ordered that includes a Diet selection(s) AND a Behavioral Tray selection for Safe Tray - with utensils, or Safe Tray - NO utensils       Plan: provide a psychological assessment and manage medications per psychiatry, staff to provide a safe and therapeutic milieu, CTC to coordinate transfer to residential CD treatment.  Rationale for change in precautions or plan: no change

## 2021-08-23 NOTE — PLAN OF CARE
Nursing plan of care   Problem: Behavior Regulation Impairment (Psychotic Signs/Symptoms)  Goal: Improved Behavioral Control (Psychotic Signs/Symptoms)  Outcome: No change     Problem: Activity and Energy Impairment (Depressive Signs/Symptoms)  Goal: Optimized Energy Level (Depressive Signs/Symptoms)  Outcome: No Change     Problem: Decreased Participation and Engagement (Depressive Signs/Symptoms)  Goal: Increased Participation and Engagement (Depressive Signs/Symptoms)  Outcome: No Change     Pt presented with depressive mood and flat affect; isolate and withdrawn; intermittently out to the milieu and noted pacing in the hallway; mostly kept to herself; No sign of responding to internal stimuli noted; rated depression and anxiety 7/10; denied SI/SIB and AVH; noted crying and sobbing in the hallway; offered 1:1 attention to share feeling and concern; emotional support was provided; pt was not interested to share her feeling even offered to talk to female nurse; requested to sleep; offered and administered 25 mg benadryl for anxiety; pt was noted for delayed response and poor attention; medication compliant; denied medication side effect; had a visit and the visit went well;  will continue to monitor and assess.

## 2021-08-23 NOTE — PLAN OF CARE
Nursing Assessment    Recent Vitals: B/P: 130/92, T: 97.9, P: 96     Sleep:  Hours of sleep at night: 7    General Shift Summary  Patient has been visible in the milieu, partakes in groups, however presents as withdrawn, guarded, with a sad affect. Patient voiced feeling depressed and has some anxiety. Behavior has been appropriate with others. She denied SI/HI/SIB/AVH. Her speech is quiet. Patient seemed to not want to talk much with writer and needed encouragement. She made a few phone calls this shift. Incite and judgment seem fair-poor. She is eating well. Hygiene is fair.     Medication compliant with no voiced side effects.    Plan is to continue to monitor patient status q 15 mins, assess response to medications, and maintain the patients safety.      Digna Rincon RN MSN

## 2021-08-23 NOTE — PROGRESS NOTES
"    ----------------------------------------------------------------------------------------------------------  [facility]  Psychiatric Progress Note  Hospital Day #3     Subjective   The patient's care was discussed with the treatment team and chart notes were reviewed.     Sleep: 7 hours (08/23/21 0700)  Scheduled meds: adherent  PRN meds: Benadryl    Per Staff report: \"Pt was isolative and withdrawn to her room; calm and pleasant but appeared depressed; rated depression 6/10 and anxiety 7/10; encourage pt to use identified coping skills;  offered and administered 25 mg benadryl PO and reported feeling better; had a visit and the visit went well; denied SI/SIB,AVH and HI; reported current stressors are caring for her cat, work and dysfunctional family; denied issue with sleep and stated she takes Seroquel for sleep, which is already scheduled for HS; took shower and groomed; medication compliant; will continue to monitor and assess.  \"  \"Pt slept 7 hours this night,weighted blsnket in place and seemed to enhance sleep pattern\"  \"Pt is isolative to room all day with the exception of visiting with her .  Pt admits to feeling very depressed but refused to talk about it with writer attempting a few different times.  Pt states her visit with her  went well.  Pt denies any SI/SIB or hearing any voices.  Pt is very quiet when speaking with her, flat and sad.  Pt is withdrawn.  \"  \"Pt presented with depressive mood and flat affect; isolate and withdrawn; intermittently out to the milieu and noted pacing in the hallway; mostly kept to herself; No sign of responding to internal stimuli noted; rated depression and anxiety 7/10; denied SI/SIB and AVH; noted crying and sobbing in the hallway; offered 1:1 attention to share feeling and concern; emotional support was provided; pt was not interested to share her feeling even offered to talk to female nurse; requested to sleep; offered and " "administered 25 mg benadryl for anxiety; pt was noted for delayed response and poor attention; medication compliant; denied medication side effect; had a visit and the visit went well;  will continue to monitor and assess.  \" 8/22      Patient interview:  Patient was feeling sad and depressed. Complained of disorganized thinking for the past 2 weeks. She is looking for a medication that can help control her symptoms. She first discussed that her sister would be here tonight and she wants to go home, when explained that we don't think she is ready for discharge she quickly agreed to stay. She is interested in trying lithium.     The risks, benefits, alternatives, and side effects of treatments including no treatment have been discussed and are understood by the patient and other caregivers.    Review of systems:  Complete psychiatric ROS is negative unless otherwise noted above.      Objective   /84   Pulse 96   Temp 98.6  F (37  C) (Oral)   Resp 16   Ht 1.651 m (5' 5\")   Wt 64.4 kg (142 lb)   SpO2 96%   BMI 23.63 kg/m    Weight is 142 lbs 0 oz  Body mass index is 23.63 kg/m .  Psychiatric Examination:  Appearance:  awake, alert and dressed in hospital scrubs  Muscle Strength and Tone: normal  Gait and Station: Normal  Behavior (Psychomotor):  no evidence of tardive dyskinesia, dystonia, or tics  Eye Contact:  fair  Speech:  paucity of speech  Mood:  sad  and depressed  Affect:  mood congruent  Attitude:  cooperative  Thought Process:  linear  Thought Content:  no evidence of suicidal ideation or homicidal ideation  Associations:  no loose associations  Insight:  fair  Judgment:  fair  Oriented to:  time, person, and place  Attention Span and Concentration:  limited  Recent and Remote Memory:  fair  Language: Fluent in English with appropriate syntax and vocabulary.  Fund of Knowledge: low-normal    No results found for this or any previous visit (from the past 24 hour(s)).     Assessment                 " Fiorella Bartlett is a 35 year old female with history of depression (including psychotic depression dating back to 2015), bipolar I disorder, sleep deprivation, eating disorder, and brief reactive psychosis who presents s/p suicide attempt overdosing seroquel, intent not clear.                The patient's presentation is consistent with B1D depressive episode.       Hospital course: Fiorella Bartlett was admitted to station 22 as a voluntary patient, and will be treated in the therapeutic milieu with appropriate individual and group therapies.                   Fiorella Bartlett continues to meet criteria for ongoing inpatient hospitalization given recent suicide attempt.      Medical course: She was medically cleared by the ED prior to admission to the unit.    PTA Medications:   Seroquel was continued and dose was increased to 300 mg during the weekend.      Plan     She was started on lithium 600 mg immediate release on 8/23. Lithium level was ordered.    - lithium 600 mg immediate release on 8/23    Today's Changes:   - lithium 600 mg immediate release on 8/23    Principal Diagnosis:   B1D depressive episode, severe    Secondary psychiatric diagnoses of concern this admission:       Psychotropic medications  Modifications    Continue    Pertinent Medical diagnoses and medications:      Consults:      Laboratory/Imaging:       Legal Status: Voluntary    Safety Assessment:   Behavioral Orders   Procedures     Code 1 - Restrict to Unit     Routine Programming     As clinically indicated     Self Injury Precaution     Status 15     Every 15 minutes.     Suicide precautions     Patients on Suicide Precautions should have a Combination Diet ordered that includes a Diet selection(s) AND a Behavioral Tray selection for Safe Tray - with utensils, or Safe Tray - NO utensils          Disposition: TBD, pending clinical stabilization, medication optimization, and formulation of a safe discharge plan.     Patient seen and  discussed with my attending physician, Dr. Sanchez who is in agreement with my assessment and plan.    Jamaica Bo MD  PGY-1 Psychiatry Resident    Attestation:  This patient has been seen and evaluated by me, Rosalinda Sanchez MD.  I have discussed this patient with the house staff team including the resident and medical student and I agree with the findings and plan in this note.    I have reviewed today's vital signs, medications, labs and imaging. Rosalinda Sanchez MD , PhD.

## 2021-08-23 NOTE — PROGRESS NOTES
Clinical Nutrition Services Brief Note - + MST score      Fiorella Bartlett is a 35 year old female screened by the dietitian d/t risk screen score of 3 d/t unsure of possible weight loss and decreased PO     Per documented weight history, patient's weight is stable.  Wt Readings from Last 10 Encounters:   08/21/21 64.4 kg (142 lb)   08/20/21 65 kg (143 lb 4.8 oz)   08/14/20 65.8 kg (145 lb)   03/12/20 64 kg (141 lb)   04/09/19 64.6 kg (142 lb 6.4 oz)   03/23/19 54.4 kg (120 lb)   02/05/19 64.4 kg (142 lb)       Due to no weight loss and likely improvement in intake while admitted with symptom stabilization, RD to sign off at this time. RD available by consult if further nutrition intervention warranted prior to discharge.     Leni Andrade RD, LD  ICU/5A/OB/Mental Health Pager (M-F): [phone]  On Call Pager (weekends only): [phone]

## 2021-08-23 NOTE — PLAN OF CARE
Problem: Sleep Disturbance (Psychotic Signs/Symptoms)  Goal: Improved Sleep (Psychotic Signs/Symptoms)  Flowsheets (Taken 8/23/2021 4326)  Mutually Determined Action Steps (Improved Sleep): sleeps 4-6 hours at night   Pt slept 7 hoursthis night,used weighted blanket

## 2021-08-23 NOTE — PROGRESS NOTES
Attended evening Occupational Therapy Clinic. Purpose: Coping skill exploration, creative expression within personally meaningful activities, and clinical observation of social, cognitive, and kinesthetic performance skills.  Pt Response: Flat affect. Chosen activity: Paint By Sticker. I to initiate, gather materials, sequence and adjust to workspace demands as needed. Demonstrated fair focus, planning, and problem solving for this mod-mod complex task. Able to ask for assistance and appeared comfortable in the presence of peers.     Chery Lopez OT on 8/23/2021 at 6:16 PM

## 2021-08-24 ENCOUNTER — TELEPHONE (OUTPATIENT)
Dept: BEHAVIORAL HEALTH | Facility: CLINIC | Age: 36
End: 2021-08-24

## 2021-08-24 LAB — LITHIUM SERPL-SCNC: 0.2 MMOL/L

## 2021-08-24 PROCEDURE — 99232 SBSQ HOSP IP/OBS MODERATE 35: CPT | Mod: GC | Performed by: PSYCHIATRY & NEUROLOGY

## 2021-08-24 PROCEDURE — G0177 OPPS/PHP; TRAIN & EDUC SERV: HCPCS

## 2021-08-24 PROCEDURE — 250N000013 HC RX MED GY IP 250 OP 250 PS 637: Performed by: STUDENT IN AN ORGANIZED HEALTH CARE EDUCATION/TRAINING PROGRAM

## 2021-08-24 PROCEDURE — 80178 ASSAY OF LITHIUM: CPT

## 2021-08-24 PROCEDURE — 124N000002 HC R&B MH UMMC

## 2021-08-24 PROCEDURE — 250N000013 HC RX MED GY IP 250 OP 250 PS 637

## 2021-08-24 PROCEDURE — H2032 ACTIVITY THERAPY, PER 15 MIN: HCPCS

## 2021-08-24 RX ORDER — HYDROXYZINE HYDROCHLORIDE 25 MG/1
25 TABLET, FILM COATED ORAL EVERY 6 HOURS PRN
Status: DISCONTINUED | OUTPATIENT
Start: 2021-08-24 | End: 2021-08-30 | Stop reason: HOSPADM

## 2021-08-24 RX ORDER — HYDROXYZINE HYDROCHLORIDE 50 MG/1
50 TABLET, FILM COATED ORAL EVERY 6 HOURS PRN
Status: DISCONTINUED | OUTPATIENT
Start: 2021-08-24 | End: 2021-08-30 | Stop reason: HOSPADM

## 2021-08-24 RX ORDER — HYDROXYZINE HYDROCHLORIDE 25 MG/1
25 TABLET, FILM COATED ORAL EVERY 6 HOURS PRN
Status: DISCONTINUED | OUTPATIENT
Start: 2021-08-24 | End: 2021-08-24

## 2021-08-24 RX ORDER — LITHIUM CARBONATE 450 MG
900 TABLET, EXTENDED RELEASE ORAL AT BEDTIME
Status: DISCONTINUED | OUTPATIENT
Start: 2021-08-24 | End: 2021-08-30 | Stop reason: HOSPADM

## 2021-08-24 RX ORDER — HYDROXYZINE HYDROCHLORIDE 50 MG/1
50 TABLET, FILM COATED ORAL EVERY 6 HOURS PRN
Status: DISCONTINUED | OUTPATIENT
Start: 2021-08-24 | End: 2021-08-24

## 2021-08-24 RX ADMIN — LITHIUM CARBONATE 900 MG: 450 TABLET, EXTENDED RELEASE ORAL at 21:29

## 2021-08-24 RX ADMIN — QUETIAPINE 300 MG: 300 TABLET, FILM COATED ORAL at 21:29

## 2021-08-24 RX ADMIN — HYDROXYZINE HYDROCHLORIDE 25 MG: 25 TABLET, FILM COATED ORAL at 15:09

## 2021-08-24 ASSESSMENT — ACTIVITIES OF DAILY LIVING (ADL)
ORAL_HYGIENE: INDEPENDENT
DRESS: INDEPENDENT
LAUNDRY: WITH SUPERVISION
ORAL_HYGIENE: INDEPENDENT
LAUNDRY: WITH SUPERVISION
HYGIENE/GROOMING: INDEPENDENT
DRESS: INDEPENDENT
HYGIENE/GROOMING: INDEPENDENT

## 2021-08-24 ASSESSMENT — MIFFLIN-ST. JEOR: SCORE: 1343.16

## 2021-08-24 NOTE — PLAN OF CARE
Pt spent most of the day in the hallway and dining room area. Pt was isolative and withdrawn to self. Pt was seen sitting at tables and starring into space. When approached, the pt had minimal responses, 1 word answers, or didn't respond at all. Pt's affect was flat. Mood depressed and anxious. Poor eye contact. Pt appeared to be RIS. Pt had no morning medications. Pt given a prn of hydroxyzine 25 mg for anxiety. No medication side effects were reported or noted. Pt declined to take a shower. Pt ate both of her meals. VS WNL. Pt denied pain.

## 2021-08-24 NOTE — PLAN OF CARE
Problem: Mood Impairment (Psychotic Signs/Symptoms)  Goal: Improved Mood Symptoms (Psychotic Signs/Symptoms)  Outcome: No Change   Nursing Assessment    Recent Vitals: B/P: 125/84 T: 98.6 P:96 R: 16    General Shift Summary  Visible in milieu, pacing jansen and sitting in lounge with peer. Peer talks to pt frequently with pt providing minimal response. Pt had visit from sister which seemed to go well, and also observed to make a few phone calls. Pt did either would not respond to writer questions or would mumble responses without providing eye contact when writer asked pt questions. Pt has very flat affect. Pt does not appear to be outwardly responding but possibly distracted by internal stimuli. No delusional statements overheard by staff. No behavioral concerns.     Pt is medication compliant, reported no side effects, and receive no PRNs.     Hygiene and appetite are adequate.     Mata Pederson RN

## 2021-08-24 NOTE — PROGRESS NOTES
Pt transferred to Station 20 at 17;30, report given to RN . Pt requests transfer as two male pts. were making her uncomfortable by following her around and flirting with her. . Pt sad , flat , depressed , quiet and isolative .

## 2021-08-24 NOTE — PLAN OF CARE
Problem: General Plan of Care (Inpatient Behavioral)  Goal: Individualization/Patient Specific Goal (IP Behavioral)  Description: The patient and/or their representative will achieve their patient-specific goals related to the plan of care.    The patient-specific goals include:  Flowsheets (Taken 8/24/2021 1038)  Areas of Vulnerability: SPMI, currently depressed  Patient Participation in Plan: yes  Family Participation in Plan: yes  Patient Strengths:   Stable housing   Adherent to medication regime   Stable and supportive family   Engagement in hobbies, sports, arts, clubs

## 2021-08-24 NOTE — PROGRESS NOTES
"RN to RN report given, and this RN assuming care at 1730. Pt ambulated on unit without issue to her room. She is minimally responsive upon initial assessment, with a very soft and delayed voice. She presented as flat and depressed, and prefers to isolate in room. During interview she admitted to a hx of occasional AH urging her to overdose. She was unable to articulate if she had AH tonight, but said \"I have disorganized thinking.\" This is exacerbated under stress. She participated in music OT group and her  visited, and she nearly needed prompts to interact with him. She remained anxious, flat and withdrawn, and was worried about being able to call her . She might have delays related to responding to internal stimuli. Will continue to monitor and encourage participation.  "

## 2021-08-24 NOTE — PROGRESS NOTES
Writer talked with pt about how she was feeling on the unit. Pt said she would like to transfer to another unit or be discharged d/t being uncomfortable around a couple of the male pt's on the unit.

## 2021-08-24 NOTE — PLAN OF CARE
Pt appeared to sleep 6.25 hours. Pt out in lounge tearful @0400, pt declined any interventions. No PRNs given or requested.    Problem: Sleep Disturbance  Goal: Adequate Sleep/Rest  Outcome: No Change

## 2021-08-24 NOTE — PLAN OF CARE
Pt participated in OT clinic IND, where she initiated a chosen project (coloiring), followed through with plan, and asked for support with supplies as needed. Pt presents with flat affect but was more engaged with peers this date. Additionally, pt participated in therapeutic group activity and discussion addressing illness management through healthy coping. Educated pt on benefits of therapeutic journaling with pt verbalizing understanding. Facilitated use of journal with pt completing free writing activity. With encouragement, pt created personalized journal for use in the future. Pt will continue to benefit from OT intervention to address implementation of positive functional coping skills, role performance, and community reintegration.

## 2021-08-24 NOTE — TELEPHONE ENCOUNTER
R: 3:19PM- Resident, on green team called to confirm that Dr. Contreras accepts Pt transfer to station 20.  20/Ben.

## 2021-08-24 NOTE — PROGRESS NOTES
"    ----------------------------------------------------------------------------------------------------------  [facility]  Psychiatric Progress Note  Hospital Day #4     Subjective   The patient's care was discussed with the treatment team and chart notes were reviewed.     Sleep: 6.25 hours (08/24/21 0614)  Scheduled meds: adherent  PRN meds: Benadryl    Per Staff report:   \"Visible in milieu, pacing jansen and sitting in lounge with peer. Peer talks to pt frequently with pt providing minimal response. Pt had visit from sister which seemed to go well, and also observed to make a few phone calls. Pt did either would not respond to writer questions or would mumble responses without providing eye contact when writer asked pt questions. Pt has very flat affect. Pt does not appear to be outwardly responding but possibly distracted by internal stimuli. No delusional statements overheard by staff. No behavioral concerns.      Pt is medication compliant, reported no side effects, and receive no PRNs.      Hygiene and appetite are adequate.\"    \"Pt appeared to sleep 6.25 hours. Pt out in lounge tearful @0400, pt declined any interventions. No PRNs given or requested.\"    \"Pt participated in OT clinic IND, where she initiated a chosen project (coloiring), followed through with plan, and asked for support with supplies as needed. Pt presents with flat affect but was more engaged with peers this date. Additionally, pt participated in therapeutic group activity and discussion addressing illness management through healthy coping. Educated pt on benefits of therapeutic journaling with pt verbalizing understanding. Facilitated use of journal with pt completing free writing activity. With encouragement, pt created personalized journal for use in the future. Pt will continue to benefit from OT intervention to address implementation of positive functional coping skills, role performance, and community " "reintegration. \"      Patient interview:    She feels anxious, stressed, and sad today. When asked about any change since yesterday she states \"I don't know\" and wants to go to a different unit because of another patient but when asked more does not talk at all for a while and states \"I don't know, I have a hard time fitting in the unit but the other units are pretty much the same.\"  We ask about side effect related to starting lithium. She reports palpitations. She does not think she should be on lithium. She is concerned about it and states that my doctor chose latuda over lithium because of the side effects. We explain about the risks and benefits and side effects and she agrees to continue taking it.   She asks about other medications that could help we explain other options and she agrees to stay on lithium.  Asking about suicidal thoughts she says I don't know. Asking about the voice she says it's not bad today. They are only commenting and are neutral, not distressing.  Appetite is good, no excessive thirst.       The risks, benefits, alternatives, and side effects of treatments including no treatment have been discussed and are understood by the patient and other caregivers.    Review of systems:  Complete psychiatric ROS is negative unless otherwise noted above.      Objective   /80   Pulse 111   Temp 97.5  F (36.4  C) (Tympanic)   Resp 12   Ht 1.651 m (5' 5\")   Wt 64.7 kg (142 lb 11.2 oz)   SpO2 96%   BMI 23.75 kg/m    Weight is 142 lbs 11.2 oz  Body mass index is 23.75 kg/m .  Psychiatric Examination:  Appearance:  awake, alert and dressed in hospital scrubs  Muscle Strength and Tone: normal  Gait and Station: Normal  Behavior (Psychomotor):  no evidence of tardive dyskinesia, dystonia, or tics  Eye Contact:  fair  Speech:  paucity of speech  Mood:  sad  and depressed  Affect:  mood congruent  Attitude:  cooperative  Thought Process:  linear  Thought Content:  no evidence of suicidal ideation " or homicidal ideation, auditory hallucinations present and patient appears to be responding to internal stimuli  Associations:  no loose associations  Insight:  fair  Judgment:  fair  Oriented to:  time, person, and place  Attention Span and Concentration:  limited  Recent and Remote Memory:  fair  Language: Fluent in English with appropriate syntax and vocabulary.  Fund of Knowledge: low-normal    No results found for this or any previous visit (from the past 24 hour(s)).     Assessment                 Fiorella Bartlett is a 35 year old female with history of depression (including psychotic depression dating back to 2015), bipolar I disorder, sleep deprivation, eating disorder, and brief reactive psychosis who presents s/p suicide attempt overdosing seroquel, intent not clear.                The patient's presentation is consistent with B1D depressive episode.       Hospital course: Fiorella Bartlett was admitted to [facility] as a voluntary patient, and will be treated in the therapeutic milieu with appropriate individual and group therapies.                   Fiorella Bartlett continues to meet criteria for ongoing inpatient hospitalization given recent suicide attempt.      Medical course: She was medically cleared by the ED prior to admission to the unit.    PTA Medications:   Seroquel was continued and dose was increased to 300 mg during the weekend.      Plan     She was started on lithium 600 mg immediate release on 8/23. Lithium level was ordered. Dose was increased to 900 extended release on 8/24.     - lithium 600 mg immediate release on 8/23  - lithium 900 mg extended release 8/24  - Increased seroquel dose from 200 to 300 mg at bedtime 8/24    Today's Changes:     - lithium 900 mg extended release 8/24    Principal Diagnosis:   B1D depressive episode with psychotic feature, severe      Secondary psychiatric diagnoses of concern this admission:       Psychotropic medications  Modifications  Increased  seroquel dose from 200 to 300 mg at bedtime     Continue    Pertinent Medical diagnoses and medications:      Consults:      Laboratory/Imaging:       Legal Status: Voluntary    Safety Assessment:   Behavioral Orders   Procedures     Code 1 - Restrict to Unit     Routine Programming     As clinically indicated     Self Injury Precaution     Status 15     Every 15 minutes.     Suicide precautions     Patients on Suicide Precautions should have a Combination Diet ordered that includes a Diet selection(s) AND a Behavioral Tray selection for Safe Tray - with utensils, or Safe Tray - NO utensils          Disposition: TBD, pending clinical stabilization, medication optimization, and formulation of a safe discharge plan.     Patient seen and discussed with my attending physician, Dr. Sanchez who is in agreement with my assessment and plan.    Jamaica Bo MD  PGY-1 Psychiatry Resident    Attestation:  This patient has been seen and evaluated by me, Rosalinda Sanchez MD.  I have discussed this patient with the house staff team including the resident and medical student and I agree with the findings and plan in this note.    I have reviewed today's vital signs, medications, labs and imaging. Rosalinda Sanchez MD , PhD.

## 2021-08-24 NOTE — PLAN OF CARE
Assessment/Intervention/Current Symptoms and Care Coordination    Attended team meeting and reviewed chart notes.    Writer spoke with pt's  Daniel ([phone]) (LAWANDA signed). Writer updated him with the latest chart notes and discussed Lithium. Daniel would like a call from the team to further discuss Lithium.        Discharge Plan or Goal  Return to home      Barriers to Discharge   Needs stabilization  Medications are being adjusted    Referral Status  None made    Legal Status  voluntary

## 2021-08-25 PROCEDURE — 250N000013 HC RX MED GY IP 250 OP 250 PS 637: Performed by: STUDENT IN AN ORGANIZED HEALTH CARE EDUCATION/TRAINING PROGRAM

## 2021-08-25 PROCEDURE — 250N000013 HC RX MED GY IP 250 OP 250 PS 637

## 2021-08-25 PROCEDURE — 124N000002 HC R&B MH UMMC

## 2021-08-25 PROCEDURE — 99233 SBSQ HOSP IP/OBS HIGH 50: CPT | Mod: GC | Performed by: PSYCHIATRY & NEUROLOGY

## 2021-08-25 PROCEDURE — G0177 OPPS/PHP; TRAIN & EDUC SERV: HCPCS

## 2021-08-25 RX ORDER — QUETIAPINE FUMARATE 400 MG/1
400 TABLET, FILM COATED ORAL AT BEDTIME
Status: DISCONTINUED | OUTPATIENT
Start: 2021-08-25 | End: 2021-08-30 | Stop reason: HOSPADM

## 2021-08-25 RX ADMIN — LITHIUM CARBONATE 900 MG: 450 TABLET, EXTENDED RELEASE ORAL at 21:06

## 2021-08-25 RX ADMIN — DOCUSATE SODIUM AND SENNOSIDES 1 TABLET: 8.6; 5 TABLET ORAL at 12:10

## 2021-08-25 RX ADMIN — QUETIAPINE FUMARATE 400 MG: 400 TABLET ORAL at 21:06

## 2021-08-25 RX ADMIN — HYDROXYZINE HYDROCHLORIDE 25 MG: 25 TABLET, FILM COATED ORAL at 12:07

## 2021-08-25 NOTE — PLAN OF CARE
Patient appeared to sleep a total of 7 hours this night shift.  No prns or snacks given or requested.

## 2021-08-25 NOTE — PLAN OF CARE
Assessment/Intervention/Current Symtoms and Care Coordination  The patient's care was discussed with the treatment team and chart notes were reviewed.   Patient met with team.  She was transferred [facility] last night due to male patient bothering her on [address].  Patient appears very flat, slow to respond . She does not report any improvement since admission.  Meds being adjusted per MD's   Patient's  will be contacted for update.    Discharge Plan or Goal  Patient will return home when stable, and resume care with her outpatient Psychiatrist, therapist    Barriers to Discharge   Ongoing symptoms of depression, pschosis  Medication mgmt    Referral Status  No referrals made today- will consider IOP when patient is more stable    Legal Status     Voluntary

## 2021-08-25 NOTE — PLAN OF CARE
"Music Therapy Group note    Clinical Hours in session: 1.0    Number of patients in group: 2    Scope of service: psychodynamic     Patient progress: initial encounter    Intervention: Music Preferences Assessment    Goal of group: to assess through music choices and response     Patient response/reaction to treatment intervention(s): Fiorella checked into group feeling \"anxious\" at a \"7/10\", which she reported was her baseline.  She selected songs like \"Somewhere Over the [place]\" and began to cry during the lyrics about dreams over the rainbow.  She was not able to articulate her feelings afterwards.  MT offered support around talking about how crying can be a helpful release, and important to our healing and asked how she felt after.  She was again unable to really articulate into words tonight.  She appeared tearful still at the end of session.  MT relayed report to RN on the unit.  Fiorella was just admitted a few hours prior to group.      Mel Chapa, MT-BC  Board-Certified Music Therapist           "

## 2021-08-25 NOTE — PROGRESS NOTES
"    ----------------------------------------------------------------------------------------------------------  [facility]  Psychiatric Progress Note  Hospital Day #5    Fiorella Bartlett MRN# [id number]   Age: 35 year old YOB: 1985   Date of Admission: 8/20/2021     Subjective   The patient was discussed with the treatment team and chart notes were reviewed.      Identifier:  Fiorlela Bartlett is a 35 year old female with history of depression (including psychotic depression dating back to 2015), bipolar I disorder, sleep deprivation, eating disorder, and brief reactive psychosis who presents s/p suicide attempt overdosing seroquel, intent not clear.     Sleep:  7 hours (08/25/21 0628)  Prescribed Medications: Taken as prescribed  PRN Psychiatric Medications: acetaminophen, alum & mag hydroxide-simethicone, diphenhydrAMINE, hydrOXYzine **OR** hydrOXYzine, OLANZapine **OR** OLANZapine, senna-docusate     25 mg atarax for anxiety    Overnight Nursing Notes/Staff Report:  No acute events. Patient denies SI/SIB/HI but endorses AH. Patient attended 2/3 groups. Was noted as flat and isolative.  \"Fiorella checked into group feeling \"anxious\" at a \"7/10\", which she reported was her baseline.  She selected songs like \"Somewhere Over the [place]\" and began to cry during the lyrics about dreams over the rainbow.  She was not able to articulate her feelings afterwards.  MT offered support around talking about how crying can be a helpful release, and important to our healing and asked how she felt after.  She was again unable to really articulate into words tonight.  She appeared tearful still at the end of session.  MT relayed report to RN on the unit.  Fiorella was just admitted a few hours prior to group.  \"    Patient interview:  Fiorella Bartlett states that they feel sad and anxious about being in the hospital today. Her  visited yesterday but she asked him to leave, however wanted " "to talk to him later and when tried to contact him couldn't get a hold of him. Rates depressed mood 8/10 and anxiety 3/10. We talked about increasing seroquel dose and patient agreed. However was not sure at first since did not want to use too many medications.      Patient was noted as withdrawn with restricted affect.          ROS   ROS was negative unless noted above.  Skin: negative  Eyes: negative  Ears/Nose/Throat: negative  Respiratory: No shortness of breath, dyspnea on exertion, cough, or hemoptysis  Cardiovascular: negative  Gastrointestinal: negative  Genitourinary: negative  Musculoskeletal: negative  Neurologic: negative  Psychiatric: negative  Hematologic/Lymphatic/Immunologic: negative  Endocrine: negative     Objective   Vitals:  Temp: 98.4  F (36.9  C) (Temp  Min: 98  F (36.7  C)  Max: 98.4  F (36.9  C))  Resp: 16 (Resp  Min: 16  Max: 16)  SpO2: 98 % (SpO2  Min: 98 %  Max: 100 %)  Pulse: 116 (Pulse  Min: 86  Max: 116)  BP: 115/67  Systolic (24hrs), Av , Min:112 , Max:115   Diastolic (24hrs), Av, Min:67, Max:78    Mental Status Examination:  Oriented to:  Grossly Oriented  General: Awake and Alert  Appearance:  appears stated age  Behavior:  disengaged and apathetic  Eye Contact:  poor due to ungoing hallucinations   Psychomotor:  no abnormal motor symptoms appreciated; no catatonia present  Speech:  soft volume/tone  Language: Fluent in English with appropriate syntax and vocabulary.  Mood:  \"sad and anxious\"  Affect:  congruent with mood, sad and tearful  Thought Process:  goal directed  Thought Content: No HI, auditory hallucinations of people talking together, negative content of hallucinations, non-distressful hallucinations and appears to be responding to internal stimuli; No apparent delusions  Associations:  intact  Insight:  fair  Judgment:  fair   Attention Span: grossly intact  Concentration:  grossly intact  Recent and Remote Memory:  grossly intact  Fund of Knowledge: " average     Allergies     Allergies   Allergen Reactions     Pcn [Penicillins] Unknown     Has been told she was allergic since she was a child.         Labs & Imaging     Results for orders placed or performed during the hospital encounter of 08/20/21 (from the past 24 hour(s))   Lithium level   Result Value Ref Range    Lithium 0.2   mmol/L        Assessment   Diagnostic Impression:  Fiorella Bartlett is a 35 year old female with history of depression (including psychotic depression dating back to 2015), bipolar I disorder, sleep deprivation, eating disorder, and brief reactive psychosis who presents s/p suicide attempt overdosing seroquel, intent not clear.    The patient's presentation is consistent with B1D depressive episode.     Principal Diagnosis:     Bipolar Affective Disorder, Current Depressive Episode with Psychotic Features, Severe    Psychiatric course:  Fiorella Bartlett was admitted to [facility] as a voluntary patient. Her PTA seroquel was continued, but increased to 200mg at bedtime. 8/23 Started lithium IR 600mg. 8/24 Switched to lithium CR and increased dose to 900mg at bedtime. 8/25 Seroquel dose was increased to 400.     - lithium 600 mg immediate release on 8/23  - lithium 900 mg extended release 8/24  - Increased seroquel dose from 200 to 300 mg at bedtime 8/24  - Increased seroquel dose from 300 to 400 mg at bedtime 8/25    Fiorella Bartlett continued to meet criteria for inpatient hospitalization medication optimization, inpatient stabilization, and appropriate discharge planning.     Medical course:   Fiorella Bartlett was physically examined by the ED prior to being transferred to the unit and was found to be medically stable and appropriate for admission.      Plan   Today's Changes:   - Increased seroquel dose from 300 to 400 mg at bedtime 8/25  _______________________________________________________________________  Psychiatric diagnoses and management:  Principal Diagnosis:     Bipolar  Affective Disorder, current depressive episode, severe, with psychotic features  - Increased seroquel dose from 300 to 400 mg at bedtime 8/25  - Lithium 900 mg Extended release     Additional Planning:    Continue to monitor for and stabilize: psychosis, impulsive and manuela    Patient will be treated in therapeutic milieu with appropriate individual and group therapies as described.    Scheduled Medications (summary):    lithium ER  900 mg Oral At Bedtime     QUEtiapine  300 mg Oral At Bedtime       PRN Medications (summary):  acetaminophen, alum & mag hydroxide-simethicone, diphenhydrAMINE, hydrOXYzine **OR** hydrOXYzine, OLANZapine **OR** OLANZapine, senna-docusate    Discontinued Medications (& Rationale):    None    Consults:    None    Legal Status:    Voluntary     SIO:    No (currently yes or no, has required one previously this admission?)    Pt has not required locked seclusion or restraints in the past 24 hours to maintain safety, please refer to RN documentation for further details.    Disposition:    TBD, pending clinical stabilization, medication optimization, and formulation of a safe discharge plan.     Safety Assessment:   Behavioral Orders   Procedures     Code 1 - Restrict to Unit     Routine Programming     As clinically indicated     Self Injury Precaution     Status 15     Every 15 minutes.     Suicide precautions     Patients on Suicide Precautions should have a Combination Diet ordered that includes a Diet selection(s) AND a Behavioral Tray selection for Safe Tray - with utensils, or Safe Tray - NO utensils        ____________________________________________________________________  Pertinent Medical diagnoses and management:  o None  ____________________________________________________________________  Patient seen and discussed with attending physician, Dr. Fabrizio Steve* who is in agreement with my assessment and plan.    Jamaica Liu MD  PGY-1 Psychiatry Resident    Attending  Attestation:  This patient has been seen and evaluated by me, Fabrizio Contreras.  I have discussed this patient with the psychiatry resident and I agree with the findings and plan in this note.    I have reviewed today's vital signs, medications, labs and imaging.     Fabrizio Jacobsen MD on 8/25/2021 at 11:21 PM

## 2021-08-25 NOTE — PLAN OF CARE
Problem: Behavioral Health Plan of Care  Goal: Develops/Participates in Therapeutic [place] to Support Successful Transition  Outcome: Improving  Intervention: Foster Therapeutic [place]  Recent Flowsheet Documentation  Taken 8/25/2021 1040 by Jey Álvarez RN  Trust Relationship/Rapport:   care explained   emotional support provided   empathic listening provided   questions encouraged   thoughts/feelings acknowledged     Problem: Behavioral Health Plan of Care  Goal: Patient-Specific Goal (Individualization)  Outcome: No Change   Patient was isolative this morning,seen in the lounge sitting by herself,quiet,no interaction with peers/staff.Patient appeared sad,flat and depressed with no eye contact.Participated in groups.During lunch time she was seen sitting on a table having lunch with one of the peers having a conversation.Patient did not have any  medication scheduled this shift.Patient has Lithium  & Seroquel scheduled @ .  Patient endorses some anxiety,4/10,requested PRN Atarax which she received.When patient was asked about her LBM,she could not say.PRN senna given,no results yet.Denies pain/chest pain/SOB.VSS.Appetite good,encourage fluids.Denies SI/SIB/hallucinations.

## 2021-08-25 NOTE — PLAN OF CARE
Problem: Behavioral Health Plan of Care  Goal: Absence of New-Onset Illness or Injury  Outcome: Improving  Goal: Optimized Coping Skills in Response to Life Stressors  Outcome: Improving     Problem: Suicidal Behavior  Goal: Suicidal Behavior is Absent or Managed  Outcome: Improving     Patient Isolative to room most part of the shift, up for meals, endorsed anxiety and depression, denies SI/SIB/hallucinations, patient verbalized to contract for safety, safety checks in place every 15 minutes per unit policy, no PRN's given this shift, compliant with schedule medications, patient had a visitor and visit went well, no other known concerns at this time, will offer support as needed per patient's plan of care.

## 2021-08-25 NOTE — PLAN OF CARE
"  Problem: General Rehab Plan of Care  Goal: Occupational Therapy Goals  Will attend OT groups and participate actively in all OT opportunities. Will assess and set goals.  Outcome: Improving    Date of Service: August 25, 2021    Description: Fiorella attended 2 occupational therapy groups today. Overall mood/affect: Restricted.   10:15 - 11:00. 5 total participants. Group game to facilitate peer socialization and openness within group discussion. Pt Response: Attentive. I to answer prompted questions. Expressed hope for \"stability in the future\" and interest in volunteering.   11:15 - 12:00. 7 total participants. Occupational Therapy Clinic. Purpose: Coping skill exploration, creative expression within personally meaningful activities, and clinical observation of social, cognitive, and kinesthetic performance skills.  Pt Response: Chosen activity: [employer] (previously started on [address]). I to initiate, gather materials, sequence and adjust to workspace demands as needed. Demonstrated adequate focus, planning, and problem solving for this minimally complex task. Quiet with min-no interaction with others.     Assessment: Demonstrates fair cognition / concentration to tasks. Benefit from engagement in OT groups is supported as they explore healthy recovery strategies, positive coping skills for symptom management, relapse prevention, and resumption of personal roles, routines and daily occupations.    Plan: Continue to encourage group attendance. Provide graded occupation-based activities for increased success towards OT goals and ongoing assessment.     Chery Lopez, OT on 8/25/2021 at 1:24 PM    "

## 2021-08-26 PROCEDURE — 250N000013 HC RX MED GY IP 250 OP 250 PS 637

## 2021-08-26 PROCEDURE — 90853 GROUP PSYCHOTHERAPY: CPT

## 2021-08-26 PROCEDURE — G0177 OPPS/PHP; TRAIN & EDUC SERV: HCPCS

## 2021-08-26 PROCEDURE — 124N000002 HC R&B MH UMMC

## 2021-08-26 PROCEDURE — 99233 SBSQ HOSP IP/OBS HIGH 50: CPT | Mod: GC | Performed by: PSYCHIATRY & NEUROLOGY

## 2021-08-26 RX ADMIN — QUETIAPINE FUMARATE 400 MG: 400 TABLET ORAL at 21:23

## 2021-08-26 RX ADMIN — LITHIUM CARBONATE 900 MG: 450 TABLET, EXTENDED RELEASE ORAL at 21:23

## 2021-08-26 ASSESSMENT — ACTIVITIES OF DAILY LIVING (ADL)
HYGIENE/GROOMING: INDEPENDENT
ORAL_HYGIENE: INDEPENDENT
LAUNDRY: WITH SUPERVISION
HYGIENE/GROOMING: HANDWASHING;INDEPENDENT
LAUNDRY: WITH SUPERVISION
ORAL_HYGIENE: INDEPENDENT
DRESS: SCRUBS (BEHAVIORAL HEALTH);INDEPENDENT
DRESS: INDEPENDENT;SCRUBS (BEHAVIORAL HEALTH);STREET CLOTHES

## 2021-08-26 ASSESSMENT — MIFFLIN-ST. JEOR: SCORE: 1345.43

## 2021-08-26 NOTE — PLAN OF CARE
"Problem: Suicidal Behavior  Goal: Suicidal Behavior is Absent or Managed  Outcome: Improving     Mental Health Nursing Assessment    Shift Summary: Fiorella remained withdrawn to herself and presented with a depressed, flat affect this shift. She reports that she had difficulty falling asleep last night, but overall slept a little better due to the increase in her seroquel last night. Pt was guarded, but pleasant on approach and denied SI, SIB, AVH, and HI. No suicidal or self injurious behaviors observed this shift, and pt reported that she could contract for safety. She had poor to fair intake with meals. Pt displayed orthostasis this morning (sittin/85, HR 86, standin/62, ) , and reported dizziness/feeling light headed. Fluids encouraged and pt instructed to transition slowly from sitting to standing.     Upon recheck, VSS:  /75   Pulse 89   Temp 97.9  F (36.6  C) (Oral)   Resp 16   Ht 1.651 m (5' 5\")   Wt 65 kg (143 lb 3.2 oz)   SpO2 99%   BMI 23.83 kg/m      Pt did not receive any medication this shift. She was encouraged to utilize staff and the resources here for support. At this time, Fiorella continues to be monitored status 15 and is placed on SI, SIB precautions.    Mood/Affect: \"Good, tired\" Euthymic, pt presents depressed/ blunted, flat affect  Behavior: Calm, cooperative, guarded and withdrawn  Milieu Participation: Attending groups and present periodically in the milieu. Reports the  led group today \"made me kind of sad.\" Went outside to the [place] this shift.  SI/SIB: Denies, contracts for safety  HI/Aggression/Agitation: Denies  A/V Hallucinations: Denies/does not appear responding.   Sleep: Pt reports feeling tired despite improved sleep last night. States the seroquel increase (400mg) was helpful overall, but she still had difficulty falling asleep    PRN Medications: none this shift    VS: Pt displayed orthostasis this morning (sittin/85, HR 86, " "standin/62, ) , and reported dizziness/feeling light headed. Fluids encouraged and pt instructed to transition slowly from sitting to standing.     Upon recheck, VSS:  /75   Pulse 89   Temp 97.9  F (36.6  C) (Oral)   Resp 16   Ht 1.651 m (5' 5\")   Wt 65 kg (143 lb 3.2 oz)   SpO2 99%   BMI 23.83 kg/m      Physical Complaints: Pt denied  Medication AE: none stated/observed  I/O: Pt appetite poor to fair, eats less than 50% of meals  GI/: denies concerns  ADLS: pt appears untidy, independent with ADLs  Skin: Pt denies concerns, observable skin WDL    "

## 2021-08-26 NOTE — PLAN OF CARE
Problem: General Rehab Plan of Care  Goal: Occupational Therapy Goals  Description: Will attend OT groups and participate actively in all OT opportunities. Will assess and set goals.  Outcome: Improving     Date of Service: August 26, 2021    Description: Fiorella attended 1 occupational therapy group today. Overall mood/affect: Bright  11:15 - 12:00. 6 total participants. Leisure exploration and sensory integration group offered for increased intrinsic motivation to engage in social, non-obligatory occupations, promote positive milieu interaction and variation of sensory experiences. Note: Group was held outside ([place]) with 2:1 staff. Pt Response: Pt verbally contracted for safety and safe return to unit. Initiated and highly engaged in social interaction with peers. Very bright / excitable affect.      Assessment: Benefit from engagement in OT groups is supported as she explores healthy recovery strategies, positive coping skills for symptom management, relapse prevention, and resumption of personal roles, routines and daily occupations.    Plan: Continue to encourage group attendance. Provide graded occupation-based activities for increased success towards OT goals and ongoing assessment.     Chery Lopez, OT on 8/26/2021 at 10:54 AM

## 2021-08-26 NOTE — PROGRESS NOTES
Behavioral Health  Note   Behavioral Health  Spirituality Group Note     Unit 20    Name: Fiorella Bartlett    YOB: 1985   MRN: [id number]    Age: 35 year old     Patient attended -led group, which included discussion of spirituality, coping with illness and building resilience.   Patient attended group for 1.0 hrs.   patient demonstrated an appreciation of topic's application for their personal circumstances.     Emeterio [facility]  Staff    Page [phone]

## 2021-08-26 NOTE — PLAN OF CARE
Pt appears to have slept for 7 hours. No PRNs or snacks given/requested  this . No concerns noted; will continue to monitor and offer support.    Problem: Sleep Disturbance  Goal: Adequate Sleep/Rest  Outcome: Improving

## 2021-08-26 NOTE — PLAN OF CARE
" Assessment/Intervention/Current Symtoms and Care Coordination  The patient's care was discussed with the treatment team and chart notes were reviewed.   Patient met with team. Patient reports feeling \"a little better\"   Affect remains flat- appetite poor, continues to report AH. Patient is reluctant but agreeable to take Lithium. She asked appropriate questions re: diagnosis and prognosis.    CTC spoke to patient's  yesterday. CTC provided update and offered support.  inquired about time frame for patient to return to work.  CTC offered assistance if FMLA forms are needed.      Discharge Plan or Goal  Patient will return home when stable, safe    Barriers to Discharge   Ongoing symptoms of depression, AH      Referral Status  None today    Legal Status     Voluntary  "

## 2021-08-26 NOTE — PROGRESS NOTES
"    ----------------------------------------------------------------------------------------------------------  [facility] [place]  Psychiatric Progress Note  Hospital Day #6    Fiorella Bartlett MRN# [id number]   Age: 35 year old YOB: 1985   Date of Admission: 8/20/2021     Subjective   The patient was discussed with the treatment team and chart notes were reviewed.      Identifier:  Fiorella Bartlett is a 35 year old female with history of depression (including psychotic depression dating back to 2015), bipolar I disorder, sleep deprivation, eating disorder, and brief reactive psychosis who presents s/p suicide attempt overdosing seroquel, intent not clear.     Sleep:  7 hours (08/26/21 0600)  Prescribed Medications: Taken as prescribed  PRN Psychiatric Medications: acetaminophen, alum & mag hydroxide-simethicone, diphenhydrAMINE, hydrOXYzine **OR** hydrOXYzine, OLANZapine **OR** OLANZapine, senna-docusate     25 mg atarax for anxiety    Overnight Nursing Notes/Staff Report:  No acute events. Patient denies SI/SIB/HI but endorses AH. Patient attended 2/3 groups. Was noted as flat and isolative.  \"Fiorella checked into group feeling \"anxious\" at a \"7/10\", which she reported was her baseline.  She selected songs like \"Somewhere Over the [place]\" and began to cry during the lyrics about dreams over the rainbow.  She was not able to articulate her feelings afterwards.  MT offered support around talking about how crying can be a helpful release, and important to our healing and asked how she felt after.  She was again unable to really articulate into words tonight.  She appeared tearful still at the end of session.  MT relayed report to RN on the unit.  Fiorella was just admitted a few hours prior to group.  \"    Patient interview:  Fiorella Jayla Waege states that they feel better today. She asks about lithium side effects. She had difficulty going to sleep last night. Appetite has been the " "same. Auditory hallucination has gotten better, the content of the voices reinforce the fact that she is having trouble adapting to a new environment in the hospital. No SI,HI,SIB. Smiles a few times during the interview and looks drastically better today.   Current and future treatment plan, along with prognosis reviewed with the patient.          ROS   ROS was negative unless noted above.  Skin: negative  Eyes: negative  Ears/Nose/Throat: negative  Respiratory: No shortness of breath, dyspnea on exertion, cough, or hemoptysis  Cardiovascular: negative  Gastrointestinal: negative  Genitourinary: negative  Musculoskeletal: negative  Neurologic: negative  Psychiatric: negative  Hematologic/Lymphatic/Immunologic: negative  Endocrine: negative     Objective   Vitals:  Temp: 97.9  F (36.6  C) (Temp  Min: 97.9  F (36.6  C)  Max: 98.2  F (36.8  C))  Resp: 16 (Resp  Min: 16  Max: 16)  SpO2: 97 % (SpO2  Min: 97 %  Max: 100 %)  Pulse: 89 (Pulse  Min: 89  Max: 89)  BP: 106/75  Systolic (24hrs), Av , Min:106 , Max:106   Diastolic (24hrs), Av, Min:75, Max:75    Mental Status Examination:  Oriented to:  Grossly Oriented  General: Awake and Alert  Appearance:  appears stated age  Behavior:  calm, cooperative, engaged and apathetic but improving  Eye Contact:  good  Psychomotor:  no abnormal motor symptoms appreciated; no catatonia present  Speech:  soft volume/tone but improved  Language: Fluent in English with appropriate syntax and vocabulary.  Mood:  \"good\"  Affect:  congruent with mood and stable  Thought Process:  goal directed  Thought Content: No HI, auditory hallucinations of people talking together and non-distressful hallucinations; No apparent delusions  Associations:  intact  Insight:  fair  Judgment:  fair   Attention Span: grossly intact  Concentration:  grossly intact  Recent and Remote Memory:  grossly intact  Fund of Knowledge: average     Allergies     Allergies   Allergen Reactions     Pcn " [Penicillins] Unknown     Has been told she was allergic since she was a child.         Labs & Imaging     No results found for this or any previous visit (from the past 24 hour(s)).     Assessment   Diagnostic Impression:  Fiorella Bartlett is a 35 year old female with history of depression (including psychotic depression dating back to 2015), bipolar I disorder, sleep deprivation, eating disorder, and brief reactive psychosis who presents s/p suicide attempt overdosing seroquel, intent not clear.    The patient's presentation is consistent with B1D depressive episode.     Principal Diagnosis:     Bipolar Affective Disorder, Current Depressive Episode with Psychotic Features, Severe    Psychiatric course:  Fiorella Bartlett was admitted to [facility] as a voluntary patient. Her PTA seroquel was continued, but increased to 200mg at bedtime. 8/23 Started lithium IR 600mg. 8/24 Switched to lithium CR and increased dose to 900mg at bedtime. 8/25 Seroquel dose was increased to 400.     - lithium 600 mg immediate release on 8/23  - lithium 900 mg extended release 8/24  - Increased seroquel dose from 200 to 300 mg at bedtime 8/24  - Increased seroquel dose from 300 to 400 mg at bedtime 8/25    Fiorella Bartlett continued to meet criteria for inpatient hospitalization medication optimization, inpatient stabilization, and appropriate discharge planning.     Medical course:   Fiorella Bartlett was physically examined by the ED prior to being transferred to the unit and was found to be medically stable and appropriate for admission.      Plan   Today's Changes:   -   _______________________________________________________________________  Psychiatric diagnoses and management:  Principal Diagnosis:     Bipolar Affective Disorder, current depressive episode, severe, with psychotic features  - Increased seroquel dose from 300 to 400 mg at bedtime 8/25  - Lithium 900 mg Extended release     Additional Planning:    Continue to monitor  for and stabilize: psychosis, impulsive and manuela    Patient will be treated in therapeutic milieu with appropriate individual and group therapies as described.    Scheduled Medications (summary):    lithium ER  900 mg Oral At Bedtime     QUEtiapine  400 mg Oral At Bedtime       PRN Medications (summary):  acetaminophen, alum & mag hydroxide-simethicone, diphenhydrAMINE, hydrOXYzine **OR** hydrOXYzine, OLANZapine **OR** OLANZapine, senna-docusate    Discontinued Medications (& Rationale):    None    Consults:    None    Legal Status:    Voluntary     SIO:    No (currently yes or no, has required one previously this admission?)    Pt has not required locked seclusion or restraints in the past 24 hours to maintain safety, please refer to RN documentation for further details.    Disposition:    TBD, pending clinical stabilization, medication optimization, and formulation of a safe discharge plan.     Safety Assessment:   Behavioral Orders   Procedures     Code 1 - Restrict to Unit     Code 3     Routine Programming     As clinically indicated     Self Injury Precaution     Status 15     Every 15 minutes.     Suicide precautions     Patients on Suicide Precautions should have a Combination Diet ordered that includes a Diet selection(s) AND a Behavioral Tray selection for Safe Tray - with utensils, or Safe Tray - NO utensils        ____________________________________________________________________  Pertinent Medical diagnoses and management:  o None  ____________________________________________________________________  Patient seen and discussed with attending physician, Dr. Fabrizio Steve* who is in agreement with my assessment and plan.    Jamaica Liu MD  PGY-1 Psychiatry Resident    Jamaica Bo MD on 8/25/2021 at 11:21 PM    Attending Attestation:  This patient has been seen and evaluated by me, Fabrizio Contreras.  I have discussed this patient with the psychiatry resident and I agree with the  findings and plan in this note.    I have reviewed today's vital signs, medications, labs and imaging.     Fabrizio Jacobsen MD on 8/26/2021 at 4:19 PM

## 2021-08-26 NOTE — PLAN OF CARE
Group Therapy Note:  2:15pm - 3:00pm  5 Participants  Topic:  Understanding Mental Illness:      Film on mental illness- diagnoses and treatment options was shown. Patient remained attentive throughout group.  Did not have any questions or concerns.

## 2021-08-27 PROCEDURE — G0177 OPPS/PHP; TRAIN & EDUC SERV: HCPCS

## 2021-08-27 PROCEDURE — 250N000013 HC RX MED GY IP 250 OP 250 PS 637

## 2021-08-27 PROCEDURE — 99232 SBSQ HOSP IP/OBS MODERATE 35: CPT | Mod: GC | Performed by: PSYCHIATRY & NEUROLOGY

## 2021-08-27 PROCEDURE — 124N000002 HC R&B MH UMMC

## 2021-08-27 RX ORDER — LITHIUM CARBONATE 450 MG
900 TABLET, EXTENDED RELEASE ORAL AT BEDTIME
Qty: 60 TABLET | Refills: 0 | Status: SHIPPED | OUTPATIENT
Start: 2021-08-27 | End: 2024-08-04

## 2021-08-27 RX ORDER — QUETIAPINE FUMARATE 400 MG/1
400 TABLET, FILM COATED ORAL AT BEDTIME
Qty: 30 TABLET | Refills: 0 | Status: SHIPPED | OUTPATIENT
Start: 2021-08-27 | End: 2024-08-06

## 2021-08-27 RX ADMIN — LITHIUM CARBONATE 900 MG: 450 TABLET, EXTENDED RELEASE ORAL at 21:33

## 2021-08-27 RX ADMIN — QUETIAPINE FUMARATE 400 MG: 400 TABLET ORAL at 21:33

## 2021-08-27 ASSESSMENT — ACTIVITIES OF DAILY LIVING (ADL)
ORAL_HYGIENE: INDEPENDENT
LAUNDRY: WITH SUPERVISION
DRESS: STREET CLOTHES;INDEPENDENT
LAUNDRY: WITH SUPERVISION
ORAL_HYGIENE: INDEPENDENT
HYGIENE/GROOMING: INDEPENDENT
HYGIENE/GROOMING: HANDWASHING;SHOWER;INDEPENDENT
DRESS: INDEPENDENT

## 2021-08-27 NOTE — PROGRESS NOTES
"    ----------------------------------------------------------------------------------------------------------  [facility]  Psychiatric Progress Note  Hospital Day #7    Fiorella Bartlett MRN# [id number]   Age: 35 year old YOB: 1985   Date of Admission: 8/20/2021     Subjective   The patient was discussed with the treatment team and chart notes were reviewed.      Identifier:  Fiorella Bartlett is a 35 year old female with history of depression (including psychotic depression dating back to 2015), bipolar I disorder, sleep deprivation, eating disorder, and brief reactive psychosis who presents s/p suicide attempt overdosing seroquel, intent not clear.     Sleep:  7 hours (08/26/21 0600)  Prescribed Medications: Taken as prescribed  PRN Psychiatric Medications: acetaminophen, alum & mag hydroxide-simethicone, diphenhydrAMINE, hydrOXYzine **OR** hydrOXYzine, OLANZapine **OR** OLANZapine, senna-docusate     None    Overnight Nursing Notes/Staff Report:  \"Pt denied SI/SIB or hallucinations. Rated dep at 7/10 and anxiety at 3/10. Pt stated that her goal is to continue to adjust to the unit and go to groups. Pt presented with a blunted affect and was in/out of the milieu. Pt also had a visit from her  that went well.\"  \"Pt appears to have slept for 5.75 hours. No PRNs or snacks given/requested. No concerns noted this shift; will continue to monitor and offer support.\"    Patient interview:  Fiorella Bartlett states that she feels tired this morning. Visited with her  and it went well. Feels lightheaded and dizzy. It is hard for her to bond with people here and we reassure her that it's ok if she didn't feel comfortable. She had done day treatment before which has been helpful. She thinks she can go home soon.    No SI,HI,SIB. Smiles a few times during the interview and looks drastically better today. Current and future treatment plan, along with prognosis reviewed with " "the patient.          ROS   ROS was negative unless noted above.  Skin: negative  Eyes: negative  Ears/Nose/Throat: negative  Respiratory: No shortness of breath, dyspnea on exertion, cough, or hemoptysis  Cardiovascular: negative  Gastrointestinal: negative  Genitourinary: negative  Musculoskeletal: negative  Neurologic: negative  Psychiatric: negative  Hematologic/Lymphatic/Immunologic: negative  Endocrine: negative     Objective   Vitals:  Temp: 98.5  F (36.9  C) (Temp  Min: 98.5  F (36.9  C)  Max: 98.6  F (37  C))  Resp: 16 (Resp  Min: 16  Max: 16)  SpO2: 99 % (SpO2  Min: 98 %  Max: 99 %)  Pulse: 82 (Pulse  Min: 82  Max: 82)  BP: 104/62  Systolic (24hrs), Av , Min:104 , Max:104   Diastolic (24hrs), Av, Min:62, Max:62    Mental Status Examination:  Oriented to:  Grossly Oriented  General: Awake and Alert  Appearance:  appears stated age  Behavior:  calm, cooperative, engaged and apathetic but improving  Eye Contact:  good  Psychomotor:  no abnormal motor symptoms appreciated; no catatonia present  Speech:  soft volume/tone but improved  Language: Fluent in English with appropriate syntax and vocabulary.  Mood:  \"good\"  Affect:  congruent with mood and stable  Thought Process:  goal directed  Thought Content: No HI, auditory hallucinations of people talking together and non-distressful hallucinations; No apparent delusions  Associations:  intact  Insight:  fair  Judgment:  fair   Attention Span: grossly intact  Concentration:  grossly intact  Recent and Remote Memory:  grossly intact  Fund of Knowledge: average     Allergies     Allergies   Allergen Reactions     Pcn [Penicillins] Unknown     Has been told she was allergic since she was a child.         Labs & Imaging     No results found for this or any previous visit (from the past 24 hour(s)).     Assessment   Diagnostic Impression:  Fiorella Bartlett is a 35 year old female with history of depression (including psychotic depression dating back to " 2015), bipolar I disorder, sleep deprivation, eating disorder, and brief reactive psychosis who presents s/p suicide attempt overdosing seroquel, intent not clear.    The patient's presentation is consistent with B1D depressive episode.     Principal Diagnosis:     Bipolar Affective Disorder, Current Depressive Episode with Psychotic Features, Severe    Psychiatric course:  Fiorella Bartlett was admitted to [facility] as a voluntary patient. Her PTA seroquel was continued, but increased to 200mg at bedtime. 8/23 Started lithium IR 600mg. 8/24 Switched to lithium CR and increased dose to 900mg at bedtime. 8/25 Seroquel dose was increased to 400.     - lithium 600 mg immediate release on 8/23  - lithium 900 mg extended release 8/24  - Increased seroquel dose from 200 to 300 mg at bedtime 8/24  - Increased seroquel dose from 300 to 400 mg at bedtime 8/25    Fiorella Bartlett continued to meet criteria for inpatient hospitalization medication optimization, inpatient stabilization, and appropriate discharge planning.     Medical course:   Fiorella Bartlett was physically examined by the ED prior to being transferred to the unit and was found to be medically stable and appropriate for admission.      Plan   Today's Changes:   - Plan for discharge for Monday  _______________________________________________________________________  Psychiatric diagnoses and management:  Principal Diagnosis:     Bipolar Affective Disorder, current depressive episode, severe, with psychotic features  - Increased seroquel dose from 300 to 400 mg at bedtime 8/25  - Lithium 900 mg Extended release     Additional Planning:    Continue to monitor for and stabilize: psychosis, impulsive and manuela    Patient will be treated in therapeutic milieu with appropriate individual and group therapies as described.    Scheduled Medications (summary):    lithium ER  900 mg Oral At Bedtime     QUEtiapine  400 mg Oral At Bedtime       PRN Medications  (summary):  acetaminophen, alum & mag hydroxide-simethicone, diphenhydrAMINE, hydrOXYzine **OR** hydrOXYzine, OLANZapine **OR** OLANZapine, senna-docusate    Discontinued Medications (& Rationale):    None    Consults:    None    Legal Status:    Voluntary     SIO:    No (currently yes or no, has required one previously this admission?)    Pt has not required locked seclusion or restraints in the past 24 hours to maintain safety, please refer to RN documentation for further details.    Disposition:    TBD, pending clinical stabilization, medication optimization, and formulation of a safe discharge plan.     Safety Assessment:   Behavioral Orders   Procedures     Code 1 - Restrict to Unit     Code 3     Routine Programming     As clinically indicated     Self Injury Precaution     Status 15     Every 15 minutes.     Suicide precautions     Patients on Suicide Precautions should have a Combination Diet ordered that includes a Diet selection(s) AND a Behavioral Tray selection for Safe Tray - with utensils, or Safe Tray - NO utensils        ____________________________________________________________________  Pertinent Medical diagnoses and management:  o None  ____________________________________________________________________  Patient seen and discussed with attending physician, Dr. Fabrizio Steve* who is in agreement with my assessment and plan.    Jamaica Liu MD  PGY-1 Psychiatry Resident    Jamaica Bo MD on 8/27/2021 at 4:19 PM    Attending Attestation:  This patient has been seen and evaluated by me, Fabrizio Contreras.  I have discussed this patient with the psychiatry resident and I agree with the findings and plan in this note.    I have reviewed today's vital signs, medications, labs and imaging.     Fabrizio Jacobsen MD on 8/27/2021 at 9:44 PM

## 2021-08-27 NOTE — PLAN OF CARE
"Pt is calm and pleasant; appears somewhat tired. She reports she has felt more tired lately, also. Her affect is somewhat blunted; mood is \"neutral.\" Pt denies MH sx. See PCS for shift asmnt.  "

## 2021-08-27 NOTE — PLAN OF CARE
Problem: General Rehab Plan of Care  Goal: Occupational Therapy Goals  Description: Will attend OT groups and participate actively in all OT opportunities. Will assess and set goals.  Outcome: Improving    Date of Service: August 27, 2021    Description: Fiorella attended 2 occupational therapy groups today. Overall mood/affect: Content - somewhat restricted.  11:15 - 12:00. 8 total participants. Occupational Therapy Clinic. Purpose: Coping skill exploration, creative expression within personally meaningful activities, and clinical observation of social, cognitive, and kinesthetic performance skills. Pt Response: Chosen activity: Card making for others. Demonstrated consistent performance and social skills as observed on previous dates.   1:15 - 2:00. 4 total participants. Journaling group for coping skill exploration and processing emotions to support healthy recovery. Education was provided on various uses of a journal, journaling tools, and exploration of personal reasons to journal. Pt Response: Participated throughout education and created personal contract to practice 1 journaling tool in the next week. Following education, Pt demonstrated good focus while engaged in 10 minute journal practice with positive results.     Assessment: Demonstrates adequate cognition / concentration to tasks. Benefit from engagement in OT groups is supported as they explore healthy recovery strategies, positive coping skills for symptom management, relapse prevention, and resumption of personal roles, routines and daily occupations.    Plan: Continue to encourage group attendance. Provide graded occupation-based activities for increased success towards OT goals and ongoing assessment.     Chery Lopez, OT on 8/27/2021 at 1:06 PM

## 2021-08-27 NOTE — PROGRESS NOTES
" 08/26/21 2300   Groups   Details Psychotherapy   Number of patients attending the group: 5  Group Length:  1 Hours     Group Therapy Type:Psychotherapy     Summary of Group / Topics Discussed:      The  Psychotherapy group goal is to promote insight to positive choice and change. Group processing is within a supportive and safe environment. Patients will process emotions using verbal group and expressive psychotherapy interventions including visual art/writing interventions.    Group interventions support patients by: fostering self awareness, communication/social skills and supports and emotional regulation    Modalities to reach these goals include: DBT distress tolerance skills and ACT metaphor, Art Therapy    Subjective -patient report of mood today- \"calm like a desert\" ( asked to describe mood as weather)    Objective/ Intervention- Goal of group and Therapeutic modality utilized- Process discussion, creativity    Group Response- group was engaged . At times two peers had to be reminded a few times about side talk and not staying with the group conversation, it was difficult to hear others speak. They were redirectable.    Patient Response-Pt was pleasant, cooperative and quietly engaged. She made a nice drawing of a desert scene based on a peers suggestion. It had a lot of details and layers and seemed calming for her. She said she gardens and makes soap at home for positive coping hobbies.       Denilson Clements, TRENT, ATR-BC              "

## 2021-08-27 NOTE — PLAN OF CARE
Pt appears to have slept for 5.75 hours. No PRNs or snacks given/requested. No concerns noted this shift; will continue to monitor and offer support.

## 2021-08-27 NOTE — PLAN OF CARE
Problem: Behavioral Health Plan of Care  Goal: Plan of Care Review  Outcome: No Change  Flowsheets (Taken 8/26/2021 0248)  Plan of Care Reviewed With: patient  Patient Agreement with Plan of Care: agrees    Pt denied SI/SIB or hallucinations. Rated dep at 7/10 and anxiety at 3/10. Pt stated that her goal is to continue to adjust to the unit and go to groups. Pt presented with a blunted affect and was in/out of the milieu. Pt also had a visit from her  that went well.

## 2021-08-27 NOTE — PLAN OF CARE
Assessment/Intervention/Current Symtoms and Care Coordination  The patient's care was discussed with the treatment team and chart notes were reviewed.   Patient met with team.  Affect is starting to brighten up. Patient reports she is feeling better each day. Mood is improving and AH are decreasing.  Insight appears much better. States that she doesn't have anyone on the unit that she can relate to- connect with but she has been trying to attend some of the groups.  Discussed referral to PHP program and [patient was agreeable.  [facility] spoke with patient's  and provided update. He too is seeing improvements.    Discharge Plan or Goal  Patient will return home when stable, safe.  Likely discharge Monday     Barriers to Discharge   Ongoing symptoms of depression/AH  Patient was started on Lithium    Referral Status  Patient will be referred to PHP today.     Legal Status     Voluntary

## 2021-08-28 LAB — LITHIUM SERPL-SCNC: 0.5 MMOL/L

## 2021-08-28 PROCEDURE — 36415 COLL VENOUS BLD VENIPUNCTURE: CPT

## 2021-08-28 PROCEDURE — 80178 ASSAY OF LITHIUM: CPT

## 2021-08-28 PROCEDURE — 124N000002 HC R&B MH UMMC

## 2021-08-28 PROCEDURE — 250N000013 HC RX MED GY IP 250 OP 250 PS 637

## 2021-08-28 PROCEDURE — G0177 OPPS/PHP; TRAIN & EDUC SERV: HCPCS

## 2021-08-28 RX ADMIN — LITHIUM CARBONATE 900 MG: 450 TABLET, EXTENDED RELEASE ORAL at 21:01

## 2021-08-28 RX ADMIN — QUETIAPINE FUMARATE 400 MG: 400 TABLET ORAL at 21:01

## 2021-08-28 ASSESSMENT — ACTIVITIES OF DAILY LIVING (ADL)
ORAL_HYGIENE: INDEPENDENT
DRESS: SCRUBS (BEHAVIORAL HEALTH)
DRESS: STREET CLOTHES;SCRUBS (BEHAVIORAL HEALTH);INDEPENDENT
LAUNDRY: WITH SUPERVISION
LAUNDRY: WITH SUPERVISION
ORAL_HYGIENE: INDEPENDENT
HYGIENE/GROOMING: HANDWASHING;INDEPENDENT
HYGIENE/GROOMING: INDEPENDENT

## 2021-08-28 NOTE — PLAN OF CARE
Problem: Behavioral Health Plan of Care  Goal: Optimized Coping Skills in Response to Life Stressors  Outcome: Improving     Behavioral  Pt was visible in the milieu and attended groups. She rated anxiety at 3/10 and depression 4/10 and said that was her baseline. Pt is looking forward to discharge on Monday at 1 pm. Pt denies SI or AVH. She reports a decreased appetite but also says that she's a vegetarian and maybe the food choices could be the cause. Pt mood was calm and affect flat. No other concerns this shift.    Medical  None  Plan  Continue to monitor

## 2021-08-28 NOTE — PROGRESS NOTES
Fiorella participated in OT clinic this afternoon, where she initiated a chosen project (handstamped greeting cards), followed through with plan, and asked for support with supplies as needed. Quiet and pleasant. Minimal verbal interaction.      08/28/21 1400   Occupational Therapy   Type of Intervention structured groups   Response Initiates, socially acceptable   Hours 1

## 2021-08-28 NOTE — PLAN OF CARE
"Pt is calm and pleasant. Her mood is \"good, content.\" She said she does not feel as tired today, vs yesterday. Her affect is rather blunted; she brightens a bit, on approach. Pt rates her depression 3/10. She states her appetite \"has been a little bit low, for a while now.\" She is taking po w/o concerns. She was out to DR for bkfst, then back to her rm. Pt said her  will come to visit at 1300. See [facility] for shift asmt.    1433) Pt was in the lounge watching tv; also journaling in the DR, this afternoon. She said the visit with her  went well.  "

## 2021-08-28 NOTE — PLAN OF CARE
Pt appears to have slept for 7 hours. No PRNs or snacks given. No concerns noted this shift; will continue to monitor and offer support.    Problem: Sleep Disturbance  Goal: Adequate Sleep/Rest  Outcome: Improving

## 2021-08-29 PROCEDURE — 250N000013 HC RX MED GY IP 250 OP 250 PS 637

## 2021-08-29 PROCEDURE — H2032 ACTIVITY THERAPY, PER 15 MIN: HCPCS

## 2021-08-29 PROCEDURE — 250N000013 HC RX MED GY IP 250 OP 250 PS 637: Performed by: STUDENT IN AN ORGANIZED HEALTH CARE EDUCATION/TRAINING PROGRAM

## 2021-08-29 PROCEDURE — 124N000002 HC R&B MH UMMC

## 2021-08-29 RX ADMIN — ACETAMINOPHEN 650 MG: 325 TABLET, FILM COATED ORAL at 17:13

## 2021-08-29 RX ADMIN — LITHIUM CARBONATE 900 MG: 450 TABLET, EXTENDED RELEASE ORAL at 21:06

## 2021-08-29 RX ADMIN — QUETIAPINE FUMARATE 400 MG: 400 TABLET ORAL at 21:06

## 2021-08-29 ASSESSMENT — ACTIVITIES OF DAILY LIVING (ADL)
LAUNDRY: WITH SUPERVISION
DRESS: STREET CLOTHES;SCRUBS (BEHAVIORAL HEALTH);INDEPENDENT
HYGIENE/GROOMING: HANDWASHING;INDEPENDENT
ORAL_HYGIENE: INDEPENDENT

## 2021-08-29 ASSESSMENT — MIFFLIN-ST. JEOR: SCORE: 1356.77

## 2021-08-29 NOTE — PLAN OF CARE
"  Problem: Behavioral Health Plan of Care  Goal: Optimized Coping Skills in Response to Life Stressors  Outcome: Improving     Behavioral  Pt was visible in the milieu. She rated anxiety and depression at 2-3/10 and talked about her discharge on Monday. Pt denies any SI thoughts or AVH. Pt endorses good appetite. She did visit with the  and said everything went \"well\"  Medical  None this shift  Plan  Continue to monitor    "

## 2021-08-29 NOTE — PLAN OF CARE
Problem: Sleep Disturbance  Goal: Adequate Sleep/Rest  Outcome: Improving    Patient appeared to be sleeping during the shift, intermittently awake for repositioning and bathroom use no prns or snacks given.

## 2021-08-29 NOTE — PLAN OF CARE
"Pt's affect is bland, her mood is \"content.\" She reports still feeling quite \"groggy,\" in the morning. Her responses are slightly delayed. She rates her depression 3, denies other MH sx. She said she \"thinks so,\" re readiness for discharge, tomorrow. See [facility] for shift asmnt.    1424) Pt had a good visit with her  again, today. Resting and reading in her rm, this afternoon.  "

## 2021-08-30 VITALS
DIASTOLIC BLOOD PRESSURE: 76 MMHG | HEIGHT: 65 IN | SYSTOLIC BLOOD PRESSURE: 107 MMHG | TEMPERATURE: 97.7 F | HEART RATE: 80 BPM | WEIGHT: 145.7 LBS | RESPIRATION RATE: 16 BRPM | BODY MASS INDEX: 24.28 KG/M2 | OXYGEN SATURATION: 100 %

## 2021-08-30 DIAGNOSIS — F31.9 BIPOLAR 1 DISORDER (H): Primary | ICD-10-CM

## 2021-08-30 LAB — LITHIUM SERPL-SCNC: 0.9 MMOL/L

## 2021-08-30 PROCEDURE — 99239 HOSP IP/OBS DSCHRG MGMT >30: CPT | Mod: GC | Performed by: PSYCHIATRY & NEUROLOGY

## 2021-08-30 PROCEDURE — 36415 COLL VENOUS BLD VENIPUNCTURE: CPT | Performed by: STUDENT IN AN ORGANIZED HEALTH CARE EDUCATION/TRAINING PROGRAM

## 2021-08-30 PROCEDURE — G0177 OPPS/PHP; TRAIN & EDUC SERV: HCPCS

## 2021-08-30 PROCEDURE — GZ63ZZZ OTHER COUNSELING: ICD-10-PCS | Performed by: PSYCHIATRY & NEUROLOGY

## 2021-08-30 PROCEDURE — GZ3ZZZZ MEDICATION MANAGEMENT: ICD-10-PCS | Performed by: PSYCHIATRY & NEUROLOGY

## 2021-08-30 PROCEDURE — 80178 ASSAY OF LITHIUM: CPT | Performed by: STUDENT IN AN ORGANIZED HEALTH CARE EDUCATION/TRAINING PROGRAM

## 2021-08-30 ASSESSMENT — ACTIVITIES OF DAILY LIVING (ADL)
ORAL_HYGIENE: INDEPENDENT
HYGIENE/GROOMING: INDEPENDENT
DRESS: INDEPENDENT
LAUNDRY: WITH SUPERVISION

## 2021-08-30 NOTE — DISCHARGE INSTRUCTIONS
Behavioral Discharge Planning and Instructions    Summary:   You were admitted to [facility] on 21 with worsening        under the care of Dr. Contreras.  You met with Dr. Contreras and his team daily for ongoing psychiatric assessment and medication management.  You had opportunities to participate in therapeutic groups on the unit.   At this time you report your mood is stabilizing and you report you are not having thoughts or intent to harm yourself or others. You will be discharged home and will resume care with your outpatient providers.    Disposition:  Home    Main Diagnosis:   Bipolar Affective DIsorder    Psychiatry Follow-up:   Appointment: Partial Hospitalization Program:    They will contact you at home for an intake appt.  If you havent heard from them, please call [phone]  [facility] [address] [facility] Floor Rm: [id number]   [place] [address]  [phone]      Appointment: Psychiatrist: Dr. Roxy Yi, DO: 21 at 8:20am (in person)   [facility]:   [address] [address]                       Appointment: Therapist: [facility]  Patient has left a message to schedule an appt.    [place] [facility]  [facility] [facility] [address]   [place], [place] [address] [phone]      Major Treatments, Procedures and Findings:   Medications were  managed throughout your stay. An internal medicine consult was completed during your stay. You had the opportunity to participate in treatment programming while on the unit including occupational therapy, mental health support and education and spiritual services.     Symptoms to Report:   Please report if you are experiencing increased aggression and/or confusion, problematic loss of sleep, worsening mood, or thoughts of suicide to your treatment team or notify your primary provider.   IF THE SYMPTOMS YOU ARE EXPERIENCING ARE A MEDICAL EMERGENCY, CALL [phone] IMMEDIATELY    Lifestyle Adjustment:   1. Adjust your  "lifestyle to get enough sleep, relaxation, exercise and good nutrition.  Continue to develop healthy coping skills to decrease stress and promote a healthy and sober lifestyle.  2. Abstain from all substances of abuse.  3. Take medications as prescribed.  Please work with your doctor to discuss any concerns you have with your medications or side effects you may be experiencing.  4. Follow up with appointments as scheduled.      Resources:   *[facility] Crisis: COPE: ([phone]) 24 hour mobile crisis support for people having a mental health crisis in [facility].   *Acute Psychiatric Services ([phone]). 24-hour walk-in crisis psychiatric support at [facility]; Emergency Medications Clinic available 7:30am - 2:00pm  *[id number]: Text  \"life\"  to [phone]   A trained crisis counselor will respond immediately  *Crisis [facility]: ([phone])  24-hour confidential telephone counseling   *[facility] Emergency Room: [phone]    General Medication Instructions:   See your medication sheet(s) for instructions.   Take all medicines as directed.  Make no changes unless your doctor suggests them.   Go to all your doctor visits.  Be sure to have all your required lab tests. This way, your medicines can be refilled on time.  Do not use any drugs not prescribed by your doctor.    Advance Directives:   Scanned document on file with [facility]? No scanned doc  Is document scanned? Pt states no document  Honoring Choices Your Rights Handout: Informed and given  Was more information offered? Materials given    The Treatment team has appreciated the opportunity to work with you. If you have any questions or concerns about your recent admission, you can contact the unit which can receive your call 24 hours a day, 7 days a week. They will be able to get in touch with a Provider if needed. The unit number is [phone]      "

## 2021-08-30 NOTE — PLAN OF CARE
Pt's affect is blunted, flat. Mood is calm.Denies SI/SIB. She was intermittently visual in the milieu socializing with peers. Denies anxiety/depression.Ate dinner and drank all the fluid that was provided. Denies other MH symptoms. She attended groups this evening.  She See [facility] for shift asmnt.    At 2202, pt in bed, sleeping in her room.

## 2021-08-30 NOTE — PLAN OF CARE
BEHAVIORAL TEAM DISCUSSION    Participants:   Dr. Contreras, Dr. Keller, Dr. Antoine, Kelley Burgos RN, Selina Thakkar MA.LP, Med students    Progress:   Patient doing much better- affect brighter, thinking clearer, speech much more spontaneous.  Patient's  in agreement that patient is doing much better.  Patient is tolerating meds well.  Patient was able to identify warning signs and crisis plan.    Anticipated length of stay:   Discharge today    Continued Stay Criteria/Rationale:   N/A    Medical/Physical:   Stable    Precautions:   Behavioral Orders   Procedures     Code 1 - Restrict to Unit     Code 3     Routine Programming     As clinically indicated     Self Injury Precaution     Status 15     Every 15 minutes.     Suicide precautions     Patients on Suicide Precautions should have a Combination Diet ordered that includes a Diet selection(s) AND a Behavioral Tray selection for Safe Tray - with utensils, or Safe Tray - NO utensils       Plan:  Patient will return home today.   will pick patient up.  Patient will resume care with outpatient Psychiatry and therapist.      Rationale for change in precautions or plan:   Discharge today.

## 2021-08-30 NOTE — PLAN OF CARE
Pt has been visible in the milieu, she is selectively social with peers. She is pleasant and cooperative upon interaction. Currently denies all mental health symptoms. Pt went to groups offered this shift. She will discharge to home, her  will be giving her a ride.

## 2021-08-30 NOTE — PLAN OF CARE
"Music Therapy Group note    Clinical Hours in session: 1.0    Number of patients in group: 5    Scope of service: psychodynamic     Patient progress: improving     Intervention: \"Hold On\"    Goal of group: Hope     Patient response/reaction to treatment intervention(s): Fiorella checked in feeling \"overwhelmed\" but \"ready\" regarding her discharge tomorrow. She participated in group writing prompt after MT shared live, original song.  Her goal for herself is \"Patience\".  Her affect appeared downtrodden, and more quiet, though she did indicate being ready to go, just wondering what will happen with her school and work, etc.    Mel Chapa, [facility]  Board-Certified Music Therapist           " Not my patient

## 2021-08-30 NOTE — PLAN OF CARE
Pt appears to have slept for 7 hours .  No PRNs given or requested. Remained in her room the entire shift.   No concerns noted this shift. Will continue to monitor and offer support.      Problem: Sleep Disturbance  Goal: Adequate Sleep/Rest  Outcome: Improving

## 2021-08-30 NOTE — PROGRESS NOTES
[facility]  Occupational Therapy Behavioral Health Assessment    Patient Name: Fiorella Bartlett    Description: OT staff met with Fiorella to review the role of occupational therapy and explain the value of having them involved in their treatment plan including options to meet current needs/self-identified goals. Overall, Fiorella has attended ~5 OT group interventions so far with a somewhat restricted affect and full participation. The below evaluation is a compilation of functional performance observation and brief 1:1 interaction.     Clinical impression through direct observation:     08/30/21 0856   Clinical Impression   Affect Restricted  (has displayed progressively broader range affect since admission)   Orientation Oriented to person, place and time   Appearance and ADLs General cleanliness observed in most areas   Attention to Internal Stimuli No observed signs   Interaction Skills Interacts appropriately with staff;Interacts appropriately with peers   Ability to Communicate Needs Independent   Verbal Content Clear;Appropriate to topic   Ability to Maintain Boundaries Maintains appropriate physical boundaries;Maintains appropriate verbal boundaries   Participation Initiates participation   Concentration Concentrates 50 minutes   Ability to Concentrate With structure   Follows and Comprehends Directions Independently follows multi-step directions   Memory Delayed and immediate recall intact   Organization Independently organizes all tasks   Decision Making Independent   Planning and Problem Solving Independently plans ahead   Ability to Apply and Learn Concepts Applies within group structure   Frustrations / Stress Tolerance Independently identifies sources of frustration/stress;Independently identifies skills    Level of Insight Some insight   Social Supports Has knowledge of support systems       Assessment: Fiorella has notable strengths including good social skills, motivation for treatment, opportunities  to live a meaningful life, and kindness to others. Due to those strengths, Fiorella may benefit from continue engagement in OT groups that support healthy recovery, specifically exploration of positive coping skills for symptom management/relapse prevention.      Plan: Initiate care plan goals and interventions.    IP OT Goal: Fiorella will demonstrate increased readiness for discharge by practice of >2 coping strategies per day to manage and reduce depressive symptoms.     Plan for Next Treatment: Provide graded occupation-based activities for increased success and ongoing assessment.     Chery Lopez, OT on 8/30/2021 at 8:59 AM

## 2021-08-31 NOTE — PROGRESS NOTES
"    ----------------------------------------------------------------------------------------------------------  [facility]  Psychiatric Progress Note  Hospital Day #10    Fiorella Bartlett MRN# [id number]   Age: 35 year old YOB: 1985   Date of Admission: 8/20/2021     Subjective   The patient was discussed with the treatment team and chart notes were reviewed.      Identifier:  Fiorella Bartlett is a 35 year old female with history of depression (including psychotic depression dating back to 2015), bipolar I disorder, sleep deprivation, eating disorder, and brief reactive psychosis who presents s/p suicide attempt overdosing seroquel, intent not clear.     Sleep:  7 hours (08/30/21 0600)  Prescribed Medications: Taken as prescribed  PRN Psychiatric Medications:      None    Overnight Nursing Notes/Staff Report:  \"Patient appeared to be sleeping during the shift, intermittently awake for repositioning and bathroom use no prns or snacks given.\"    \"Pt's affect is bland, her mood is \"content.\" She reports still feeling quite \"groggy,\" in the morning. Her responses are slightly delayed. She rates her depression 3, denies other MH sx. She said she \"thinks so,\" re readiness for discharge, tomorrow. See [facility] for shift asmnt.     1424) Pt had a good visit with her  again, today. Resting and reading in her rm, this afternoon.\"  \"Pt's affect is blunted, flat. Mood is calm.Denies SI/SIB. She was intermittently visual in the milieu socializing with peers. Denies anxiety/depression.Ate dinner and drank all the fluid that was provided. Denies other MH symptoms. She attended groups this evening.  She See [facility] for shift asmnt.     At 2202, pt in bed, sleeping in her room.\"      Patient interview:  Fiorella Jayla Waege states that she feels so much better compared to when she came here. Asks about lithium and whether she can take it during pregnancy, asks about medication interaction and was " "educated about it.   No SI,HI,SIB, AH, VH. Plan for discharge was discussed and she is ready to go.         ROS   ROS was negative unless noted above.  Skin: negative  Eyes: negative  Ears/Nose/Throat: negative  Respiratory: No shortness of breath, dyspnea on exertion, cough, or hemoptysis  Cardiovascular: negative  Gastrointestinal: negative  Genitourinary: negative  Musculoskeletal: negative  Neurologic: negative  Psychiatric: negative  Hematologic/Lymphatic/Immunologic: negative  Endocrine: negative     Objective   Vitals:  Temp: 97.7  F (36.5  C) (Temp  Min: 97.7  F (36.5  C)  Max: 97.7  F (36.5  C))  Resp: 16 (Resp  Min: 16  Max: 16)  SpO2: 100 % (SpO2  Min: 100 %  Max: 100 %)  Pulse: 80 (Pulse  Min: 80  Max: 80)  BP: 107/76  Systolic (24hrs), Av , Min:107 , Max:107   Diastolic (24hrs), Av, Min:76, Max:76    Mental Status Examination:  Oriented to:  Grossly Oriented  General: Awake and Alert  Appearance:  appears stated age  Behavior:  calm, cooperative and engaged   Eye Contact:  good  Psychomotor:  no abnormal motor symptoms appreciated; no catatonia present  Speech:  soft volume/tone but improved  Language: Fluent in English with appropriate syntax and vocabulary.  Mood:  \"good\"  Affect:  congruent with mood and stable  Thought Process:  goal directed  Thought Content: No SI/HI/AH/VH; No apparent delusions  Associations:  intact  Insight:  fair  Judgment:  fair   Attention Span: grossly intact  Concentration:  grossly intact  Recent and Remote Memory:  grossly intact  Fund of Knowledge: average     Allergies     Allergies   Allergen Reactions     Pcn [Penicillins] Unknown     Has been told she was allergic since she was a child.         Labs & Imaging     Results for orders placed or performed during the hospital encounter of 21 (from the past 24 hour(s))   Lithium level   Result Value Ref Range    Lithium 0.9   mmol/L        Assessment   Diagnostic Impression:  Firoella Bartlett is a 35 year " old female with history of depression (including psychotic depression dating back to 2015), bipolar I disorder, sleep deprivation, eating disorder, and brief reactive psychosis who presents s/p suicide attempt overdosing seroquel, intent not clear.    The patient's presentation is consistent with B1D depressive episode.     Principal Diagnosis:     Bipolar Affective Disorder, Current Depressive Episode with Psychotic Features, Severe    Psychiatric course:  Fiorella Bartlett was admitted to [facility] as a voluntary patient. Her PTA seroquel was continued, but increased to 200mg at bedtime. 8/23 Started lithium IR 600mg. 8/24 Switched to lithium CR and increased dose to 900mg at bedtime. 8/25 Seroquel dose was increased to 400. Lithium dose was measured on 8/26 18:00 which was 0.5. Lithium lever was measured 8/30 14.5 hours after last dose was 0.9.    - lithium 600 mg immediate release on 8/23  - lithium 900 mg extended release 8/24  - Increased seroquel dose from 200 to 300 mg at bedtime 8/24  - Increased seroquel dose from 300 to 400 mg at bedtime 8/25    Fiorella Bartlett continued to meet criteria for inpatient hospitalization medication optimization, inpatient stabilization, and appropriate discharge planning.     Medical course:   Fiorella Bartlett was physically examined by the ED prior to being transferred to the unit and was found to be medically stable and appropriate for admission.      Plan   Today's Changes:   - Discharge  _______________________________________________________________________  Psychiatric diagnoses and management:  Principal Diagnosis:     Bipolar Affective Disorder, current depressive episode, severe, with psychotic features  - Increased seroquel dose from 300 to 400 mg at bedtime 8/25  - Lithium 900 mg Extended release     Additional Planning:    Continue to monitor for and stabilize: psychosis, impulsive and manuela    Patient will be treated in therapeutic milieu with appropriate  individual and group therapies as described.    Scheduled Medications (summary):      PRN Medications (summary):      Discontinued Medications (& Rationale):    None    Consults:    None    Legal Status:    Voluntary     SIO:    No (currently yes or no, has required one previously this admission?)    Pt has not required locked seclusion or restraints in the past 24 hours to maintain safety, please refer to RN documentation for further details.    Disposition:    TBD, pending clinical stabilization, medication optimization, and formulation of a safe discharge plan.     Safety Assessment:       ____________________________________________________________________  Pertinent Medical diagnoses and management:  o None  ____________________________________________________________________  Patient seen and discussed with attending physician, Dr. Fabrizio Steve* who is in agreement with my assessment and plan.    Jamaica Liu MD  PGY-1 Psychiatry Resident    Jamaica Bo MD on 8/27/2021 at 4:19 PM    Attending Attestation:  This patient has been seen and evaluated by me, Fabrizio Contreras.  I have discussed this patient with the psychiatry resident and I agree with the findings and plan in this note.    I have reviewed today's vital signs, medications, labs and imaging.     Fabrizio Jacobsen MD on 8/30/2021 at 11:35 PM    Jamaica Bo MD

## 2021-08-31 NOTE — DISCHARGE SUMMARY
"    ----------------------------------------------------------------------------------------------------------  [facility], [place]   Discharge Summary  Hospital Day #10  Fiorella Bartlett MRN# [id number]   Age: 35 year old YOB: 1985   Date of Admission:  8/20/2021  Date of Discharge:  8/30/2021  2:25 PM  Admitting Physician:  Fabrizio Jacobsen MD  Discharge Physician:  Fabrizio Jacobsen     Event Leading to Hospitalization:   History obtained from patient and electronic chart     Fiorella Bartlett is a 35 year old  female with a past psychiatric history of Bipolar Type 1 admitted from the  ER on 08/21/2021 due to concern for s/p suicide attempt in the context of increased stress at work  Resulting in insomnia. Stressors including chronic mental health issues and workplace problems.      Per ED Note:   \"35 year old woman received per transport from [place] at [facility],voluntary status.,pt had an overdose of seroquel this am which was interupted by her spouse,she admits that this was a suicide attempt and that she was experiencing command auditoty hallucinations and wanted to die.,she exhibits paranoia,disorganized thought process,and passive si,she contracts for safety,she has been sleeping poorly and feels overwhelmed by increased stress at work as well as school.pt has a hx of bipolardisorder with psychosis and has presented twice in the past 6 years  with similar sx and decompensation\"     She was medically cleared for admission to inpatient psychiatric unit.     Per Family Report:  Daniel on 8/20/21 per Dec assesment  \"He states that as of Wednesday 8/18/21 her psychosis and paranoia become more overt.  He states that she was more confused, disorganized, and paranoid.  He states that she would stand at the windows telling him that her boss is outside.  He states that she impulsively wanted to quit her job.  He states that " "she has been laying in bed, touching her face, waking up and seemingly disorganized.  He states that today she was much worse and grabbed several bottles of pills and alcohol.  He states that its possible she took some Seroquel.  \"     ED/Hospital Course   Pt medically cleared through poison control ,this was her second interupted od,she admits to sib at times which consists of picking at her back upper areas ,she has been placed on si and sib precautions zak appears to be sleeping peacefully at this time,defer addotional admission assessment to team        Per patient report:    PT reports that over the last week-two weeks she has been having more difficultly with stress and sleep.  Pt reports that the promotion and class have been difficult for her.  PT also reports stress of taking their cat to the emergency vet.  PT reports that concurrently she has been losing sleep. She was last seen by her outpatient psychiatric provider  on 8 June 2021. Fiorella reported that she \"just does not want to end up here again\". She said that she feels embarrassment about her diagnosis and that everyone knows at the workplace.      She reported her mood as currently depressed, though over the past many days or few weeks, it was mixed with elements of elevated, irritable and depressed mood.  She acknowledged difficulty focusing and difficulty organizing her thoughts. During this interview, she didn't speak of command auditory hallucinations (as was noted in ER report).     She remained quiet when asked about SI to this writer. She was unable to volunteer a safety plan at this time. She denied any thought to hurt or kill anyone else at any time.     Her primary goal for this hospitalization is to discharge and not come back.     See Admission note by Fabrizio Jacobsen MD on 8/20/2021 for additional details.      Objective:   B/P: 107/76, T: 97.7, P: 80, R: 16    Psychiatric Examination:  Oriented to:  Grossly Oriented  General: " "Awake and Alert  Appearance:  appears stated age  Behavior:  calm, cooperative and engaged   Eye Contact:  good  Psychomotor:  no abnormal motor symptoms appreciated; no catatonia present  Speech:  soft volume/tone but improved  Language: Fluent in English with appropriate syntax and vocabulary.  Mood:  \"good\"  Affect:  congruent with mood and stable  Thought Process:  goal directed  Thought Content: No SI/HI/AH/VH; No apparent delusions  Associations:  intact  Insight:  fair  Judgment:  fair   Attention Span: grossly intact  Concentration:  grossly intact  Recent and Remote Memory:  grossly intact  Fund of Knowledge: average     Hospital Course:   Diagnostic Impression:                Shahla Bartlett is a 35 year old female with history of depression (including psychotic depression dating back to 2015), bipolar I disorder, sleep deprivation, eating disorder, and brief reactive psychosis who presents s/p suicide attempt overdosing seroquel, intent not clear.                The patient's presentation is consistent with B1D depressive episode with psychotic features.      Psychiatric Course:  Fiorella Bartlett was admitted to [facility] as a voluntary patient. Her PTA seroquel was continued, but increased to 200mg at bedtime. 8/23 Started lithium IR 600mg. 8/24 Switched to lithium CR and increased dose to 900mg at bedtime. 8/25 Seroquel dose was increased to 400. Lithium dose was measured on 8/26 18:00 which was 0.5. Lithium lever was measured 8/30 14.5 hours after last dose was 0.9.     - lithium 600 mg immediate release on 8/23  - lithium 900 mg extended release 8/24  - Increased seroquel dose from 200 to 300 mg at bedtime 8/24  - Increased seroquel dose from 300 to 400 mg at bedtime 8/25     Fiorella Bartlett continued to meet criteria for inpatient hospitalization medication optimization, inpatient stabilization, and appropriate discharge planning.      Medical Course:  Fiorella Bartlett was physically examined by " the ED prior to being transferred to the unit and was found to be medically stable and appropriate for admission.      Consults:   None    Risk Assessment:   Today Fiorella Bartlett denies SI, SIB and HI. No overt evidence of psychosis or manuela observed. Patient grossly appears to be cognitively intact. Insight and judgement have improved since admission. Patient is aware of consequences of medication non adherence . Patient has not exhibited aggressive or violence behaviors since prior to this admission. Has no notable risk factors for self-harm and risk is mitigated by commitment to family, sobriety, absence of past attempts, ability to volunteer a safety plan and history of seeking help when needed. Patient does not have immediate access to firearms. Therefore, based on all available evidence including the factors cited above, she does not appear to be at imminent risk for self-harm, does not meet criteria for a 72-hr hold, and therefore remains appropriate for ongoing outpatient level of care. Patient agreed to further reduce risk of self-harm by remaining medication adherent. Additional steps taken to minimize risk include: medication optimization, close psychiatric follow up and provision of crisis resources.  Patient expressed understanding of risk associated with medication non adherence including increased risk of harm to self or others.      Fiorella was discharged to home. During this admission, she did participate in groups and was visible in the milieu, and her symptoms of depressed mood and hallucinations improved. At the time of discharge she was determined to not be a danger to herself or others.     This document serves as a transfer of care to Fiorella Bartlett's outpatient providers.     Diagnoses:   B1D depressive episode with psychotic features.       Discharge Plan:   Patient is being discharged to home with the following medications and appointments as detailed below:    Medications:    Discharge  Medication List as of 2021  1:17 PM      START taking these medications    Details   lithium (ESKALITH CR/LITHOBID) 450 MG CR tablet Take 2 tablets (900 mg) by mouth At Bedtime, Disp-60 tablet, R-0, E-Prescribe         CONTINUE these medications which have CHANGED    Details   QUEtiapine (SEROQUEL) 400 MG tablet Take 1 tablet (400 mg) by mouth At Bedtime, Disp-30 tablet, R-0, E-Prescribe         CONTINUE these medications which have NOT CHANGED    Details   Cholecalciferol (VITAMIN D3 PO) Take 1 tablet by mouth daily , Historical           Appointment: Partial Hospitalization Program:    They will contact you at home for an intake appt.  If you havent heard from them, please call [phone]  [facility] Rm: [id number]   [facility], [place] [address] [phone]   Appointment: Psychiatrist: Dr. Roxy Yi, DO: 21 at 8:20am (in person)   [facility]:   [address] [place], [place] [address]., [place] [address]                      Appointment: Therapist: [facility]  Patient has left a message to schedule an appt.    [facility]  [id number]   [place], [place] [address] [phone]      Pt seen and discussed with my attending, Fabrizio Bo MD  PGY-1 Psychiatry Resident    Attestation:  The patient has been seen and evaluated by me, Fabrizio Contreras . I have examined the patient today and reviewed the discharge plan with the resident. I agree with the final assessment and plan, as noted in the discharge summary. I have reviewed today's vital signs, medications, labs and imaging.    Total time discharge plannin minutes  Fabrizio Jacobsen MD on 2021 at 11:29 PM       Appendix A: All Labs This Admission:     Results for orders placed or performed during the hospital encounter of 21   Lithium level     Status: Normal   Result Value Ref Range    Lithium 0.2   mmol/L   Lithium level     Status: Normal    Result Value Ref Range    Lithium 0.5   mmol/L   Lithium level     Status: Normal   Result Value Ref Range    Lithium 0.9   mmol/L

## 2021-09-01 ENCOUNTER — TELEPHONE (OUTPATIENT)
Dept: PHARMACY | Facility: OTHER | Age: 36
End: 2021-09-01

## 2021-09-01 NOTE — TELEPHONE ENCOUNTER
MTM referral from: Transitions of Care (recent hospital discharge or ED visit)    MTM referral outreach attempt #2 on September 1, 2021 at 1:03 PM      Outcome: Patient not reachable after several attempts, will route to MTM Pharmacist/Provider as an FYI. Thank you for the referral.    Fide Gonzales MTM Coordinator

## 2021-10-10 ENCOUNTER — HEALTH MAINTENANCE LETTER (OUTPATIENT)
Age: 36
End: 2021-10-10

## 2022-05-21 ENCOUNTER — HEALTH MAINTENANCE LETTER (OUTPATIENT)
Age: 37
End: 2022-05-21

## 2022-09-18 ENCOUNTER — HEALTH MAINTENANCE LETTER (OUTPATIENT)
Age: 37
End: 2022-09-18

## 2023-06-04 ENCOUNTER — HEALTH MAINTENANCE LETTER (OUTPATIENT)
Age: 38
End: 2023-06-04

## 2024-05-21 ENCOUNTER — OFFICE VISIT (OUTPATIENT)
Dept: PLASTIC SURGERY | Facility: CLINIC | Age: 39
End: 2024-05-21

## 2024-05-21 DIAGNOSIS — Z41.1 ENCOUNTER FOR COSMETIC PROCEDURE: Primary | ICD-10-CM

## 2024-05-21 NOTE — PROGRESS NOTES
Facial Plastic and Reconstructive Surgery Cosmetic Consultation  05/21/24     HPI:   I had the pleasure of seeing Fiorella Bartlett today in clinic for consultation for cosmetic rhinoplasty.     Fiorella Bartlett is a 38 year old. She is currently undergoing treatment for breast cancer. She has finished chemo and scheduled for her mastectomy this week. She may need additional chemotherapy pending the pathology.     She reports that she is bothered by the appearance of her nose.  She reports that she thinks it has always been crooked but really has noticed it over the last 5 years.  She thinks she did have a trauma to her nose when she was 7.  Otherwise no history of surgery on her nose.  She has some obstructed breathing on the right side.  She has used Flonase in the past for 3 months consistently which may have improved her breathing slightly.  She does not suffer from bloody noses.      PE:  Alert and Oriented, Answering Questions Appropriately  Atraumatic, Normocephalic, Face Symmetric  Skin: Bonilla 2  Facial Nerve Intact and facial movement symmetric  On frontal view, she has some Biofinity of her tip.  She also has a somewhat bulbous tip with malpositioning of her lower lateral cartilages.  Her tip is deviated to the left.  She has some narrowing of her nasal bones on frontal view.  Based view shows that her columella is deviated and she has an obvious septal deviation of the caudal septum to the right.  On profile view, she has a slight dorsal convexity.  She has appropriate rotation and projection.            IMPRESSION/PLAN: Fiorella Bartlett is a 38 year old female here today in consult for cosmetic rhinoplasty.  We discussed changes to her nose including defining the tip, straightening it, and taking down the slight dorsal convexity.  I discussed that I would not do anything on her nose until she is done with treatment and healed.  I anticipate this will be several months.  I would like her to get  through treatment and then come back to see me when she is all completed.  I would submit part of this through insurance given her rightward deviation to her septum. Risks and benefits were discussed including but not limited to bleeding, infection, asymmetry, persistent or worsened breathing, septal perforation, numbness/tingling, need for additional procedures.       Photodocumentation was obtained.

## 2024-07-14 ENCOUNTER — HEALTH MAINTENANCE LETTER (OUTPATIENT)
Age: 39
End: 2024-07-14

## 2024-08-03 ENCOUNTER — HOSPITAL ENCOUNTER (OUTPATIENT)
Facility: CLINIC | Age: 39
Setting detail: OBSERVATION
Discharge: PSYCHIATRIC OR SUBSTANCE USE FACILITY | End: 2024-08-06
Attending: EMERGENCY MEDICINE | Admitting: EMERGENCY MEDICINE
Payer: COMMERCIAL

## 2024-08-03 DIAGNOSIS — F31.30 BIPOLAR I DISORDER, MOST RECENT EPISODE DEPRESSED (H): Primary | ICD-10-CM

## 2024-08-03 DIAGNOSIS — F06.1 CATATONIA: ICD-10-CM

## 2024-08-03 DIAGNOSIS — G47.00 INSOMNIA, UNSPECIFIED TYPE: ICD-10-CM

## 2024-08-03 LAB — LITHIUM SERPL-SCNC: 0.45 MMOL/L (ref 0.6–1.2)

## 2024-08-03 PROCEDURE — 250N000013 HC RX MED GY IP 250 OP 250 PS 637: Performed by: EMERGENCY MEDICINE

## 2024-08-03 PROCEDURE — 99285 EMERGENCY DEPT VISIT HI MDM: CPT

## 2024-08-03 PROCEDURE — 84443 ASSAY THYROID STIM HORMONE: CPT | Performed by: NURSE PRACTITIONER

## 2024-08-03 PROCEDURE — 80178 ASSAY OF LITHIUM: CPT | Performed by: EMERGENCY MEDICINE

## 2024-08-03 PROCEDURE — 84439 ASSAY OF FREE THYROXINE: CPT | Performed by: NURSE PRACTITIONER

## 2024-08-03 PROCEDURE — 250N000013 HC RX MED GY IP 250 OP 250 PS 637: Performed by: NURSE PRACTITIONER

## 2024-08-03 PROCEDURE — G0378 HOSPITAL OBSERVATION PER HR: HCPCS

## 2024-08-03 PROCEDURE — 36415 COLL VENOUS BLD VENIPUNCTURE: CPT | Performed by: EMERGENCY MEDICINE

## 2024-08-03 RX ORDER — QUETIAPINE FUMARATE 100 MG/1
200 TABLET, FILM COATED ORAL AT BEDTIME
Status: ON HOLD | COMMUNITY
Start: 2024-07-28 | End: 2024-08-08

## 2024-08-03 RX ORDER — QUETIAPINE FUMARATE 50 MG/1
100 TABLET, FILM COATED ORAL 3 TIMES DAILY PRN
Status: DISCONTINUED | OUTPATIENT
Start: 2024-08-03 | End: 2024-08-06 | Stop reason: HOSPADM

## 2024-08-03 RX ORDER — ACETAMINOPHEN 325 MG/1
650 TABLET ORAL EVERY 4 HOURS PRN
Status: DISCONTINUED | OUTPATIENT
Start: 2024-08-03 | End: 2024-08-06 | Stop reason: HOSPADM

## 2024-08-03 RX ORDER — HYDROXYZINE HYDROCHLORIDE 25 MG/1
50 TABLET, FILM COATED ORAL EVERY 6 HOURS PRN
Status: DISCONTINUED | OUTPATIENT
Start: 2024-08-03 | End: 2024-08-06 | Stop reason: HOSPADM

## 2024-08-03 RX ORDER — LITHIUM CARBONATE 450 MG
450 TABLET, EXTENDED RELEASE ORAL AT BEDTIME
Status: DISCONTINUED | OUTPATIENT
Start: 2024-08-03 | End: 2024-08-06 | Stop reason: HOSPADM

## 2024-08-03 RX ORDER — QUETIAPINE FUMARATE 100 MG/1
100 TABLET, FILM COATED ORAL 3 TIMES DAILY PRN
Status: DISCONTINUED | OUTPATIENT
Start: 2024-08-03 | End: 2024-08-03

## 2024-08-03 RX ORDER — QUETIAPINE FUMARATE 25 MG/1
100 TABLET, FILM COATED ORAL ONCE
Status: COMPLETED | OUTPATIENT
Start: 2024-08-03 | End: 2024-08-03

## 2024-08-03 RX ORDER — ONDANSETRON 4 MG/1
4 TABLET, ORALLY DISINTEGRATING ORAL EVERY 6 HOURS PRN
Status: DISCONTINUED | OUTPATIENT
Start: 2024-08-03 | End: 2024-08-06 | Stop reason: HOSPADM

## 2024-08-03 RX ORDER — QUETIAPINE 200 MG/1
200 TABLET, FILM COATED, EXTENDED RELEASE ORAL AT BEDTIME
Status: DISCONTINUED | OUTPATIENT
Start: 2024-08-03 | End: 2024-08-03

## 2024-08-03 RX ADMIN — QUETIAPINE FUMARATE 100 MG: 25 TABLET ORAL at 20:40

## 2024-08-03 RX ADMIN — QUETIAPINE FUMARATE 100 MG: 100 TABLET ORAL at 22:22

## 2024-08-03 RX ADMIN — LITHIUM CARBONATE 450 MG: 450 TABLET, EXTENDED RELEASE ORAL at 22:22

## 2024-08-03 RX ADMIN — HYDROXYZINE HYDROCHLORIDE 50 MG: 50 TABLET, FILM COATED ORAL at 22:22

## 2024-08-03 ASSESSMENT — COLUMBIA-SUICIDE SEVERITY RATING SCALE - C-SSRS
1. IN THE PAST MONTH, HAVE YOU WISHED YOU WERE DEAD OR WISHED YOU COULD GO TO SLEEP AND NOT WAKE UP?: NO
6. HAVE YOU EVER DONE ANYTHING, STARTED TO DO ANYTHING, OR PREPARED TO DO ANYTHING TO END YOUR LIFE?: NO
2. HAVE YOU ACTUALLY HAD ANY THOUGHTS OF KILLING YOURSELF IN THE PAST MONTH?: NO

## 2024-08-03 ASSESSMENT — ACTIVITIES OF DAILY LIVING (ADL)
ADLS_ACUITY_SCORE: 37

## 2024-08-04 LAB
T4 FREE SERPL-MCNC: 0.68 NG/DL (ref 0.9–1.7)
T4 FREE SERPL-MCNC: 0.82 NG/DL (ref 0.9–1.7)
TSH SERPL DL<=0.005 MIU/L-ACNC: 24.61 UIU/ML (ref 0.3–4.2)
TSH SERPL DL<=0.005 MIU/L-ACNC: 29.64 UIU/ML (ref 0.3–4.2)

## 2024-08-04 PROCEDURE — 250N000013 HC RX MED GY IP 250 OP 250 PS 637: Performed by: NURSE PRACTITIONER

## 2024-08-04 PROCEDURE — 80053 COMPREHEN METABOLIC PANEL: CPT | Performed by: NURSE PRACTITIONER

## 2024-08-04 PROCEDURE — G0378 HOSPITAL OBSERVATION PER HR: HCPCS

## 2024-08-04 PROCEDURE — 84439 ASSAY OF FREE THYROXINE: CPT | Performed by: NURSE PRACTITIONER

## 2024-08-04 PROCEDURE — 84443 ASSAY THYROID STIM HORMONE: CPT | Performed by: NURSE PRACTITIONER

## 2024-08-04 PROCEDURE — 99222 1ST HOSP IP/OBS MODERATE 55: CPT | Performed by: NURSE PRACTITIONER

## 2024-08-04 PROCEDURE — 36415 COLL VENOUS BLD VENIPUNCTURE: CPT | Performed by: NURSE PRACTITIONER

## 2024-08-04 RX ORDER — LEVOTHYROXINE SODIUM 100 UG/1
100 TABLET ORAL DAILY
Status: ON HOLD | COMMUNITY
End: 2024-08-08

## 2024-08-04 RX ORDER — LITHIUM CARBONATE 450 MG
450 TABLET, EXTENDED RELEASE ORAL AT BEDTIME
Status: ON HOLD | COMMUNITY
End: 2024-08-08

## 2024-08-04 RX ORDER — LORAZEPAM 1 MG/1
1 TABLET ORAL ONCE
Status: COMPLETED | OUTPATIENT
Start: 2024-08-04 | End: 2024-08-04

## 2024-08-04 RX ORDER — LEVOTHYROXINE SODIUM 100 UG/1
100 TABLET ORAL DAILY
Status: DISCONTINUED | OUTPATIENT
Start: 2024-08-04 | End: 2024-08-06 | Stop reason: HOSPADM

## 2024-08-04 RX ADMIN — QUETIAPINE FUMARATE 100 MG: 100 TABLET ORAL at 09:17

## 2024-08-04 RX ADMIN — ACETAMINOPHEN 650 MG: 325 TABLET, FILM COATED ORAL at 12:31

## 2024-08-04 RX ADMIN — QUETIAPINE FUMARATE 100 MG: 100 TABLET ORAL at 22:30

## 2024-08-04 RX ADMIN — LEVOTHYROXINE SODIUM 100 MCG: 100 TABLET ORAL at 21:36

## 2024-08-04 RX ADMIN — LITHIUM CARBONATE 450 MG: 450 TABLET, EXTENDED RELEASE ORAL at 21:36

## 2024-08-04 RX ADMIN — LORAZEPAM 1 MG: 1 TABLET ORAL at 14:09

## 2024-08-04 RX ADMIN — HYDROXYZINE HYDROCHLORIDE 50 MG: 50 TABLET, FILM COATED ORAL at 09:17

## 2024-08-04 ASSESSMENT — ACTIVITIES OF DAILY LIVING (ADL)
ADLS_ACUITY_SCORE: 37
HYGIENE/GROOMING: INDEPENDENT
ADLS_ACUITY_SCORE: 37

## 2024-08-04 NOTE — ED PROVIDER NOTES
Emergency Department Note      History of Present Illness     Chief Complaint   Psychiatric Evaluation      HPI   Fiorella Bartlett is a 38 year old female who presents with concern of racing thoughts.  Patient has had significant insomnia.  She has had difficulty sleeping for multiple nights in a row.  The patient reports that she has a history of breast cancer and is due for reconstructive surgery soon.  The patient is not suicidal or homicidal.  The patient takes Seroquel 100 mg at night.  Recently she has been taking an extra tablet of the Seroquel at night to try to help her sleep.  She is on lithium.  She takes it at night.  No recent GI illness.  No change in the dosing of the lithium.  She has no medical concerns she wants addressed.  She denies any chest pain, abdominal pain or back pain.  No nausea, vomiting or diarrhea.  No fever or cough.  The patient has a psychiatrist, but has not seen the psychiatrist for a while.    Independent Historian   , Daniel, at bedside and affirms this and is supportive.    Review of External Notes   Dr. Jacobsen psychiatry discharge summary from 8/30/2021    Past Medical History     Medical History and Problem List   Past Medical History:   Diagnosis Date    Breast cancer (H)     Depressive disorder        Medications   Cholecalciferol (VITAMIN D3 PO)  lithium (ESKALITH CR/LITHOBID) 450 MG CR tablet  QUEtiapine (SEROQUEL) 400 MG tablet        Surgical History   Past Surgical History:   Procedure Laterality Date    IR CHEST PORT PLACEMENT > 5 YRS OF AGE  11/7/2023       Physical Exam     Patient Vitals for the past 24 hrs:   BP Temp Pulse Resp SpO2   08/03/24 1937 (!) 141/90 97.8  F (36.6  C) 67 16 98 %     Physical Exam  General:  Sitting on bed, comfortable appearing.   HENT:  No obvious trauma to head  Right Ear:  External ear normal.   Left Ear:  External ear normal.   Nose:  Nose normal.   Eyes:  Conjunctivae and EOM are normal.  Neck: Normal range of motion.  Neck supple. No tracheal deviation present.   Pulm/Chest: No respiratory distress  M/S: Normal range of motion.   Neuro: Alert. GCS 15.  Skin: Skin is warm and dry. No rash noted. Not diaphoretic.   Psych: Flat affect, quiet, slightly depressed appearing.    Diagnostics     Lab Results   Labs Ordered and Resulted from Time of ED Arrival to Time of ED Departure   LITHIUM LEVEL - Abnormal       Result Value    Lithium 0.45 (*)        Independent Interpretation   None    ED Course      Medications Administered   Medications   QUEtiapine (SEROquel) tablet 100 mg (100 mg Oral $Given 8/3/24 2040)       Procedures   Procedures     Discussion of Management   None    ED Course        Additional Documentation  None    Medical Decision Making / Diagnosis     CMS Diagnoses: None    MIPS       None    MDM   Fiorella Bartlett is a very pleasant 38 year old female who presents with racing thoughts and insomnia.  The patient has bipolar.  She is to undergo breast reconstruction secondary to history of breast cancer soon.  This is caused some anxiety.  She is not suicidal or homicidal.  No visual auditory hallucinations.  She takes Seroquel and lithium at night.  No change in the dosing of either these, but she has been doubling her Seroquel to try to help her to sleep.  No GI illness.  Lithium level sent and found to be slightly subtherapeutic.  No recent GI illness or change in the dose.  Patient has no medical concerns she wants addressed.  She is here voluntarily.   is at bedside and supportive.  Patient is medically cleared at this time.  I called report to Олег at [facility].    Disposition   The patient was transferred to [facility].     Diagnosis     ICD-10-CM    1. Insomnia, unspecified type  G47.00       2. Bipolar 1 disorder (H)  F31.9            Discharge Medications   New Prescriptions    No medications on file         DO Diony Muñiz Robert James, DO  08/03/24 3085

## 2024-08-04 NOTE — ED TRIAGE NOTES
Pt reports hx of bipolar disorder and has been unable to sleep for past week. Pt had recent breast ca treatment and just returned to work three weeks ago. Pt also preparing for reconstructive surgery and believes these are contributing factors for why she can't sleep. Denies si/hi. Accompanied by  in triage.      Triage Assessment (Adult)       Row Name 08/03/24 1937          Respiratory WDL    Respiratory WDL WDL        Cardiac WDL    Cardiac WDL WDL        Cognitive/Neuro/Behavioral WDL    Cognitive/Neuro/Behavioral WDL WDL

## 2024-08-04 NOTE — ED NOTES
Patient appeared sad, quiet this morning. Offered prn, patient declined. Warm blanket, pillow, and chamomile tea given.

## 2024-08-04 NOTE — ED NOTES
Patient given seroquel and hydroxyzine per prn order. Patient reports difficulty sleeping. Patient directed to sensory room A, headphones given.

## 2024-08-04 NOTE — PROGRESS NOTES
"Writer met with patient in consult room and attempted to complete mental health crisis assessment. Patient stated that she came to the ED voluntarily and her  brought her here. She reported \"I've had a lot of symptoms and couldn't sleep. I recently finished cancer treatment and so I've been feeling really crummy after that.\" Patient denied experiencing suicidal ideation. Patient's head dropped and she fell asleep. Writer ended interview and assisted patient back to her chair. Patient was notified that a sensory room is available to her if she would like more privacy. Crisis assessment to be completed when patient is able to participate.     JAKE Crowder on 8/3/2024 at 11:24 PM  "

## 2024-08-04 NOTE — PROGRESS NOTES
Pt comes in with concerns of anxiety and insomnia. She reports not being able to sleep for few nights in the row. She also reports racing thought and increased anxiety. Pt is tearful at times but cooperative with intake process. She is soft spoken. Denies any drug or etoh use. Denies any SI.   Nursing and risk assessments completed. Assessments reviewed with LMHP and physician. Admission information reviewed with patient. Patient given a tour of [facility] and instructions on using the facility. Questions regarding [facility] addressed. Pt safety search completed.

## 2024-08-04 NOTE — PROGRESS NOTES
Fiorella Jaylashelby Bartlett  August 4, 2024  Plan of Care Hand-off Note     Patient Care Path: observation    Plan for Care:   Pt presented to the ED with her  due to concerns for worsening insomnia and potentially starting to be in a manic episode. Upon meeting Pt, she presents as confused, fatigued, and disoriented. Writer attempted to meet with Pt three times this morning, and each time she expressed feeling tired and was going to try to sleep, then offered to get sleep before completing DEC, and then would be observed 10 minutes walking around the milieu again. Pt does not appear to fully track questions or situation, but is responsive to interventions and is willing to stay at [facility]. Pt will benefit from remaining on [facility] under observation, with goal to stablize current insomnia and ideally prevent full-blown manic episode.    Identified Goals and Safety Issues: stabilize insomnia    Overview:  Daniel Nicholson (Spouse) [phone]            Legal Status: Legal Status at Admission: Voluntary/Patient has signed consent for treatment    Psychiatry Consult: NA, remains on [facility]    Updated RN, provider regarding plan of care.           TRENT ZAMUDIO

## 2024-08-04 NOTE — ED NOTES
[facility]  ED to E[facility] Checklist:      Goal for EMPATH: Bipolar    Current Behavior: Calm and Cooperative    Safety Concerns: None    Legal Hold Status: Voluntary    Medically Cleared by ED provider: Yes    Patient Therapeutically Searched: Not searched - Currently in triage    Belongings: Remain with patient    Independent Ambulation at Baseline: Yes/No: Yes    Participates in Care/Conversation: Yes/No: Yes    Patient Informed about [facility]: Yes/No: Yes    DEC: Ordered and pending    Patient Ready to be Transferred to [facility]? Yes/No: Yes

## 2024-08-04 NOTE — ED PROVIDER NOTES
[facility] - Initial Psychiatric Observation Note  [facility]  Observation Initiation Date: Aug 3, 2024    Fiorella Bartlett MRN: [id number]   Age: 38 year old YOB: 1985     History     Chief Complaint   Patient presents with    Psychiatric Evaluation     HPI  Fiorella Bartlett is a 38 year old female with a past history notable for  a history of bioplar disorder who presented to the emergency department voluntarily with an increase in racing thoughts and insomnia. She was cleared medically and transferred to [facility] late last evening for additional assessment and treatment planning. At the time of the assessment today, 24 she is approaching 17 hours in emergency care.    Please see the [facility] assessment & reassessment (if available), ED physician notes and nursing notes for additional information and collateral content if available. All were reviewed prior to meeting with the patient. Pertinent content includes the following:    From the ED physician note from last evenin year old female who presents with concern of racing thoughts.  Patient has had significant insomnia.  She has had difficulty sleeping for multiple nights in a row.  The patient reports that she has a history of breast cancer and is due for reconstructive surgery soon.  The patient is not suicidal or homicidal.  The patient takes Seroquel 100 mg at night.  Recently she has been taking an extra tablet of the Seroquel at night to try to help her sleep.  She is on lithium.  She takes it at night.  No recent GI illness.  No change in the dosing of the lithium.  She has no medical concerns she wants addressed.  She denies any chest pain, abdominal pain or back pain.  No nausea, vomiting or diarrhea.  No fever or cough.  The patient has a psychiatrist, but has not seen the psychiatrist for a while.     Lithium level 0.45 mmol/L.    On approach today, Fiorella is found lying down on a mattress in a sensory room. She  agreed to an interview. There is paucity of speech; however, she is engaged throughout and appears to be a reliable informant. She endorses missing some of her lithium doses. She denies SI/HI, A/V hallucinations, delusions or paranoia. She endorses significant anxiety, racing thoughts and insomnia. She appears overtly depressed with psychomotor slowing. She went back to work in July which has been difficult. She reports that her rumination, insomnia and racing thoughts typically go away with PRN Seroquel; but it was not successful this time. She agrees to continue Lithium and Seroquel and to remain overnight in [facility] for further stabilization.     Past Medical History  Past Medical History:   Diagnosis Date    Breast cancer (H)     Depressive disorder      Past Surgical History:   Procedure Laterality Date    IR CHEST PORT PLACEMENT > 5 YRS OF AGE  11/7/2023     QUEtiapine (SEROQUEL) 100 MG tablet  Cholecalciferol (VITAMIN D3 PO)  levothyroxine (SYNTHROID/LEVOTHROID) 100 MCG tablet  lithium (ESKALITH CR/LITHOBID) 450 MG CR tablet      Allergies   Allergen Reactions    Pcn [Penicillins] Unknown     Has been told she was allergic since she was a child.      Family History  Family History   Problem Relation Age of Onset    Anxiety Disorder Sister      Social History   Social History     Tobacco Use    Smoking status: Never    Smokeless tobacco: Never   Substance Use Topics    Alcohol use: No    Drug use: No          Review of Systems  A medically appropriate review of systems was performed with pertinent positives and negatives noted in the HPI, and all other systems negative.    Physical Examination   BP: (!) 141/90  Pulse: 67  Temp: 97.8  F (36.6  C)  Resp: 16  SpO2: 98 %    Physical Exam  General: Appears stated age.   Neuro: Alert and fully oriented. Extremities appear to demonstrate normal strength on visual inspection.   Integumentary/Skin: no rash visualized, normal color    Psychiatric Examination    Appearance: awake, fatigued  Attitude:  cooperative  Eye Contact:  good  Mood:  anxious and depressed  Affect:  mood congruent and intensity is blunted  Speech:  clear, coherent and paucity of speech  Psychomotor Behavior:  physical retardation  Thought Process:  linear  Associations:  no loose associations  Thought Content:  no evidence of suicidal ideation or homicidal ideation and no evidence of psychotic thought  Insight:  good  Judgement:  intact  Oriented to:  time, person, and place  Attention Span and Concentration:  fair  Recent and Remote Memory:  fair  Language: able to name/identify objects without impairment  Fund of Knowledge: intact with awareness of current and past events    ED Course        Labs Ordered and Resulted from Time of ED Arrival to Time of ED Departure   LITHIUM LEVEL - Abnormal       Result Value    Lithium 0.45 (*)        Assessments & Plan (with Medical Decision Making)   Patient presenting with a history of bipolar 1 disorder and breast cancer who presented to the emergency department voluntarily with an increase in ruminating and racing thoughts and insomnia in the presence of recent chemotherapy for breast cancer and inconsistent adherence with lithium. She endorses feeling anxious, which was not successfully treated with hydroxyzine or Seroquel. Together, through a shared decision-making process, a plan of care was developed as is noted below . Nursing notes reviewed noting no acute issues.     I have reviewed the assessment completed by the [facility].     During the observation period, the patient did not require medications for agitation, and did not require restraints/seclusion for patient and/or provider safety.     The patient was found to have a psychiatric condition that would benefit from an observation stay in the emergency department for further psychiatric stabilization and/or coordination of a safe disposition. The observation plan includes serial assessments of  psychiatric condition, potential administration of medications if indicated, further disposition pending the patient's psychiatric course during the monitoring period.     Preliminary diagnosis:    ICD-10-CM    1. Insomnia, unspecified type  G47.00       2. Bipolar 1 disorder (H)  F31.9            Treatment Plan:  - Admit for Observation.  - Continue Lithium  mg at bedtime, with follow up and adjustments in the outpatient setting.  - Seroquel 100 mg TID as needed for ruminations and racing thoughts.  - One time dose of Ativan 1 mg now, for anxiety that has not been helped with Seroquel or hydroxyzine.  - EmPATH standing orders for pain, anxiety, agitation, nausea.  -Medication education provided this visit includes, rationale for medication, importance of compliance, medication interactions, and common side effects.   -Supportive psychotherapy provided regarding patient's stressors and past mental health symptoms problem solving ways to improve overall wellness and cope with acute and chronic mental health symptoms.  -Observe overnight and reassess tomorrow.  - Follow up with outpatient psychiatry and therapy after discharge.    --  SHANON Muniz CNP   [facility] EMERGENCY DEPT  EmPATH Unit      Amber Joyce APRN CNP  08/04/24 1636    Addendum: TSH add on lab returned high at 24.61 with Free T4 low at 0.82. Concern for possible hemolysis; therefore repeated which also returned high at 29.64, w/ Free T4 at 0.68. VSS.  Discussed with Dr. Em in the ED. Will give dose of levothyroxine now, and will get back on track with daily morning dosing tomorrow. Pt informed.       Amber Joyce APRN CNP  08/04/24 6605

## 2024-08-04 NOTE — CONSULTS
Diagnostic Evaluation Consultation  Crisis Assessment    Patient Name: Fiorella Bartlett  Age:  38 year old  Legal Sex: female  Gender Identity: female  Pronouns:   Race: White  Ethnicity: Not  or   Language: English      Patient was assessed: In person   Crisis Assessment Start Date: 08/04/24  Crisis Assessment Start Time: 1053  Crisis Assessment Stop Time: 1111  Patient location: [facility] EMERGENCY DEPT                             [id number]    Referral Data and Chief Complaint  Fiorella Bartlett presents to the ED with family/friends. Patient is presenting to the ED for the following concerns: Significant behavioral change, Worsening psychosocial stress, Other (see comment) (insomnia, concerns for manic episode).   Factors that make the mental health crisis life threatening or complex are:  Pt presented to the ED with her  due to concerns for worsening insomnia and concerns for Pt starting to be in a manic episode. Per Pt's , Pt has struggled to sleep for the past several days, and has become increasingly disorganized, confused, and at times displayed inappropriate affect. Pt shares she's been getting confused for the past few weeks, but struggles with sharing specifics. Pt identifies recent stressors with recently returning to work after a long medical leave due to treatment for breast cancer. Per Pt's , Pt has historically experienced manic episodes when experiencing increased work stress. Pt presents as very quiet, fatigued, cautious, confused, and disorganized. Pt is unable fully complete this assessment as she became very tearful when writer shared her 's concerns for Pt's lack of sleep and hoping Pt would be able to stabilize at [facility] to prevent inpatient hospitalization. Pt denies any SI, NSSIB, or HI.    Informed Consent and Assessment Methods  Explained the crisis assessment process, including applicable information disclosures and limits to  confidentiality, assessed understanding of the process, and obtained consent to proceed with the assessment.  Assessment methods included conducting a formal interview with patient, review of medical records, collaboration with medical staff, and obtaining relevant collateral information from family and community providers when available.       Patient response to interventions: acceptance expressed  Coping skills were attempted to reduce the crisis:  came to EmPATH     History of the Crisis   Per Pt and chart review, Pt was diagnosed with Bipolar in 2019. Pt has been prescribed Lithium and Seroquel and reports she has been compliant with medications. Pt shares she has previous suicide attempts, with most recent back in 2014. Pt also endorses many past mental health diagnoses, but is unable to provide specifics and stated 'I don't know' when asked about past diagnoses of anxiety or depression. Pt denies any history of substance use, HI, or awareness of family mental health history.    Brief Psychosocial History  Family:  , Children no  Support System:    Employment Status:  employed full-time  Source of Income:  salary/wages  Financial Environmental Concerns:  none  Current Hobbies:  other (see comments) (unable to answer)  Barriers in Personal Life:  other (see comments) (unable to sleep)    Significant Clinical History  Current Anxiety Symptoms:  excessive worry, anxious  Current Depression/Trauma:  crying or feels like crying, sadness  Current Somatic Symptoms:  anxious, excessive worry  Current Psychosis/Thought Disturbance:  forgetful (Pt unable to sleep for several days)  Current Eating Symptoms:     Chemical Use History:  Alcohol: None  Benzodiazepines: None  Opiates: None  Cocaine: None  Marijuana: None  Other Use: None   Past diagnosis:  Bipolar Disorder, Suicide attempt(s)  Family history:  No known history of mental health or chemical health concerns  Past treatment:  Individual therapy,  Psychiatric Medication Management, Inpatient Hospitalization  Details of most recent treatment:  Pt unable to provide  Other relevant history:  Pt has been undergoing breast cancer treatment; recently returned to work in July. Has reconstructive surgery scheduled for August 20, 2024       Collateral Information  Is there collateral information: Yes     Collateral information name, relationship, phone number:  Daniel Nicholson (Spouse) [phone]    What happened today: She has been disorganized, can't remember what she was doing or what she said two minutes ago. She will just start giggling randomly, when nothing is happening. She is showing the early telltale signs before she has a manic episode     What is different about patient's functioning: Normally, her baseline is introspective, clear, organized. She's very capable, works fulltime     Concern about alcohol/drug use:  No    What do you think the patient needs:  to get some sleep; she might need inpatient hospitalization    Has patient made comments about wanting to kill themselves/others: no    If d/c is recommended, can they take part in safety/aftercare planning:  yes     Risk Assessment  Arroyo Suicide Severity Rating Scale Full Clinical Version:  Suicidal Ideation  Q6 Suicide Behavior (Lifetime): no          Ar[place]o Suicide Severity Rating Scale Recent:   Suicidal Ideation (Recent)  Q1 Wished to be Dead (Past Month): no  Q2 Suicidal Thoughts (Past Month): no  Level of Risk per Screen: no risks indicated  Intensity of Ideation (Recent)  Frequency (Past 1 Month):  (denies, NA)  Duration (Past 1 Month):  (denies, NA)  Controllability (Past 1 Month):  (denies, NA)  Deterrents (Past 1 Month):  (denies, NA)  Reasons for Ideation (Past 1 Month):  (denies, NA)  Suicidal Behavior (Recent)  Actual Attempt (Past 3 Months): No  Total Number of Actual Attempts (Past 3 Months): 0  Actual Attempt Description (Past 3 Months): ELSA schaffer  Has subject engaged in  non-suicidal self-injurious behavior? (Past 3 Months): No  Interrupted Attempts (Past 3 Months): No  Total Number of Interrupted Attempts (Past 3 Months): 0  Interrupted Attempt Description (Past 3 Months): denies, NA  Aborted or Self-Interrupted Attempt (Past 3 Months): No  Total Number of Aborted or Self-Interrupted Attempts (Past 3 Months): 0  Aborted or Self-Interrupted Attempt Description (Past 3 Months): denies, NA  Preparatory Acts or Behavior (Past 3 Months): No  Total Number of Preparatory Acts (Past 3 Months): 0  Preparatory Acts or Behavior Description (Past 3 Months): denies, NA    Environmental or Psychosocial Events: other life stressors, other (see comment) (recently returned to work following extended leave due to breast cancer treatment)  Protective Factors: Protective Factors: strong bond to family unit, community support, or employment, intact marriage or domestic partnership    Does the patient have thoughts of harming others? Feels Like Hurting Others: no  Previous Attempt to Hurt Others: no  Is the patient engaging in sexually inappropriate behavior?: no    Is the patient engaging in sexually inappropriate behavior?  no        Mental Status Exam   Affect: Blunted, Flat  Appearance: Disheveled  Attention Span/Concentration: Inattentive  Eye Contact: Variable    Fund of Knowledge:  (unable to assess)   Language /Speech Content: Fluent  Language /Speech Volume: Soft  Language /Speech Rate/Productions: Slow  Recent Memory:  (unable to assess)  Remote Memory:  (unable to assess)  Mood: Anxious, Sad, Apathetic  Orientation to Person: Yes   Orientation to Place: Yes  Orientation to Time of Day: Yes  Orientation to Date: Yes     Situation (Do they understand why they are here?): Answer (please comment) (unable to assess; Pt appeared confused at times but understands she's in Em[facility])  Psychomotor Behavior: Underactive  Thought Content: Clear  Thought Form: Other (please comment) (confused and  disorganized)     Medication  Psychotropic medications:   Medication Orders - Psychiatric (From admission, onward)      Start     Dose/Rate Route Frequency Ordered Stop    08/03/24 2200  lithium (ESKALITH CR/LITHOBID) CR tablet 450 mg         450 mg Oral AT BEDTIME 08/03/24 2136 08/03/24 2136  QUEtiapine (SEROquel) tablet 100 mg         100 mg Oral 3 TIMES DAILY PRN 08/03/24 2137 08/03/24 2127  hydrOXYzine HCl (ATARAX) tablet 50 mg         50 mg Oral EVERY 6 HOURS PRN 08/03/24 2129               Current Care Team  Patient Care Team:  No Ref-Primary, Physician as PCP - General  [facility] Paula MD as Assigned Surgical Provider    Diagnosis  Patient Active Problem List   Diagnosis Code    Mental health disorder F99    Sleep deprivation Z72.820    Bipolar 1 disorder (H) F31.9    Bipolar disorder, current episode depressed, severe, with psychotic features (H) F31.5    Suicide attempt by substance overdose (H) T65.92XA    Insomnia, unspecified type G47.00       Primary Problem This Admission  Active Hospital Problems    Insomnia, unspecified type      Bipolar 1 disorder (H)        Clinical Summary and Substantiation of Recommendations   Pt presented to the ED with her  due to concerns for worsening insomnia and potentially starting to be in a manic episode. Upon meeting Pt, she presents as confused, fatigued, and disoriented. Writer attempted to meet with Pt three times this morning, and each time she expressed feeling tired and was going to try to sleep, then offered to get sleep before completing DEC, and then would be observed 10 minutes walking around the milieu again. Pt does not appear to fully track questions or situation, but is responsive to interventions and is willing to stay at [facility]. Pt will benefit from remaining on Em[facility] under observation, with goal to stablize current insomnia and ideally prevent full-blown manic episode.      Patient coping skills  attempted to reduce the crisis:  came to [facility]    Disposition  Recommended disposition: Observation        Reviewed case and recommendations with attending provider. Attending Name: Amber Joyce CNP, APRN       Attending concurs with disposition: yes       Patient and/or validated legal guardian concurs with disposition:   yes       Final disposition:  observation    Legal status on admission: Voluntary/Patient has signed consent for treatment    Assessment Details   Total duration spent with the patient: 18 min     CPT code(s) utilized: Non-Billable    TRENT ZAMUDIO, Psychotherapist  DEC - Triage & Transition Services  Callback: [phone]

## 2024-08-05 ENCOUNTER — TELEPHONE (OUTPATIENT)
Dept: BEHAVIORAL HEALTH | Facility: CLINIC | Age: 39
End: 2024-08-05
Payer: COMMERCIAL

## 2024-08-05 LAB
ALBUMIN SERPL BCG-MCNC: 4.5 G/DL (ref 3.5–5.2)
ALP SERPL-CCNC: 62 U/L (ref 40–150)
ALT SERPL W P-5'-P-CCNC: 18 U/L (ref 0–50)
ANION GAP SERPL CALCULATED.3IONS-SCNC: 11 MMOL/L (ref 7–15)
AST SERPL W P-5'-P-CCNC: 21 U/L (ref 0–45)
BILIRUB SERPL-MCNC: 0.2 MG/DL
BUN SERPL-MCNC: 11.7 MG/DL (ref 6–20)
CALCIUM SERPL-MCNC: 9.5 MG/DL (ref 8.8–10.4)
CHLORIDE SERPL-SCNC: 104 MMOL/L (ref 98–107)
CREAT SERPL-MCNC: 0.79 MG/DL (ref 0.51–0.95)
EGFRCR SERPLBLD CKD-EPI 2021: >90 ML/MIN/1.73M2
ERYTHROCYTE [DISTWIDTH] IN BLOOD BY AUTOMATED COUNT: 11.9 % (ref 10–15)
GLUCOSE SERPL-MCNC: 100 MG/DL (ref 70–99)
HCO3 SERPL-SCNC: 23 MMOL/L (ref 22–29)
HCT VFR BLD AUTO: 38.8 % (ref 35–47)
HGB BLD-MCNC: 13.3 G/DL (ref 11.7–15.7)
MCH RBC QN AUTO: 31 PG (ref 26.5–33)
MCHC RBC AUTO-ENTMCNC: 34.3 G/DL (ref 31.5–36.5)
MCV RBC AUTO: 90 FL (ref 78–100)
PLATELET # BLD AUTO: 214 10E3/UL (ref 150–450)
POTASSIUM SERPL-SCNC: 4 MMOL/L (ref 3.4–5.3)
PROT SERPL-MCNC: 6.6 G/DL (ref 6.4–8.3)
RBC # BLD AUTO: 4.29 10E6/UL (ref 3.8–5.2)
SARS-COV-2 RNA RESP QL NAA+PROBE: NEGATIVE
SODIUM SERPL-SCNC: 138 MMOL/L (ref 135–145)
WBC # BLD AUTO: 6 10E3/UL (ref 4–11)

## 2024-08-05 PROCEDURE — 99233 SBSQ HOSP IP/OBS HIGH 50: CPT | Performed by: NURSE PRACTITIONER

## 2024-08-05 PROCEDURE — G0378 HOSPITAL OBSERVATION PER HR: HCPCS

## 2024-08-05 PROCEDURE — 85041 AUTOMATED RBC COUNT: CPT | Performed by: NURSE PRACTITIONER

## 2024-08-05 PROCEDURE — 250N000013 HC RX MED GY IP 250 OP 250 PS 637: Performed by: NURSE PRACTITIONER

## 2024-08-05 PROCEDURE — 87635 SARS-COV-2 COVID-19 AMP PRB: CPT | Performed by: NURSE PRACTITIONER

## 2024-08-05 PROCEDURE — 36415 COLL VENOUS BLD VENIPUNCTURE: CPT | Performed by: NURSE PRACTITIONER

## 2024-08-05 RX ORDER — LORAZEPAM 0.5 MG/1
0.5 TABLET ORAL 3 TIMES DAILY
Status: DISCONTINUED | OUTPATIENT
Start: 2024-08-05 | End: 2024-08-06 | Stop reason: HOSPADM

## 2024-08-05 RX ORDER — QUETIAPINE FUMARATE 200 MG/1
200 TABLET, FILM COATED ORAL AT BEDTIME
Status: DISCONTINUED | OUTPATIENT
Start: 2024-08-05 | End: 2024-08-06

## 2024-08-05 RX ORDER — LORAZEPAM 1 MG/1
1 TABLET ORAL ONCE
Status: COMPLETED | OUTPATIENT
Start: 2024-08-05 | End: 2024-08-05

## 2024-08-05 RX ADMIN — LORAZEPAM 1 MG: 1 TABLET ORAL at 11:07

## 2024-08-05 RX ADMIN — QUETIAPINE FUMARATE 200 MG: 200 TABLET ORAL at 21:00

## 2024-08-05 RX ADMIN — QUETIAPINE FUMARATE 100 MG: 100 TABLET ORAL at 18:47

## 2024-08-05 RX ADMIN — LITHIUM CARBONATE 450 MG: 450 TABLET, EXTENDED RELEASE ORAL at 21:00

## 2024-08-05 RX ADMIN — LEVOTHYROXINE SODIUM 100 MCG: 100 TABLET ORAL at 07:45

## 2024-08-05 RX ADMIN — HYDROXYZINE HYDROCHLORIDE 50 MG: 50 TABLET, FILM COATED ORAL at 02:07

## 2024-08-05 RX ADMIN — QUETIAPINE FUMARATE 100 MG: 100 TABLET ORAL at 07:45

## 2024-08-05 RX ADMIN — LORAZEPAM 0.5 MG: 0.5 TABLET ORAL at 19:55

## 2024-08-05 ASSESSMENT — ACTIVITIES OF DAILY LIVING (ADL)
ADLS_ACUITY_SCORE: 37
ADLS_ACUITY_SCORE: 37
HYGIENE/GROOMING: INDEPENDENT
ADLS_ACUITY_SCORE: 37

## 2024-08-05 NOTE — PROGRESS NOTES
Patient has not had a quality sleep for the past 4 to 5 days now. [place] no or very minimal sleep overnight in the unit as well. Wanting to discharge. Denies SI, HI, appears to be disorganized.

## 2024-08-05 NOTE — ED NOTES
"Pt approached writer and said \"let's be done\". Writer asked what she meant, to which she repeated herself and walked away.  "

## 2024-08-05 NOTE — TELEPHONE ENCOUNTER
R: [facility] Access Inpatient Bed Call Log 8/5/2024 6:24 PM   Intake has called facilities that have not updated their bed status within the last 12 hours.??             [facility]   [place]: @ cap per website.   [place]-- [facility]:  Posting 0 beds. [phone], APS:[phone] ECT   [place]-- [facility]: Posting 0 beds. ECT Tx offered.        [place]-- [facility]:  Posting 0 beds.   [facility]: Posting 0 beds. [phone] ECT Tx offered.   [place] -- [facility]: Posting 3 beds.   [place] -- [facility] Posting 0 beds. [phone]     [place] -- [facility]:  Posting 0 beds. Low acuity only. [phone]  [facility]: Posting 5 beds. Mixed unit/Low acuity only.  [phone]   [facility]: Posting 0 beds Low acuity, No aggression.    [facility]: @ cap per website ECT Tx offered [phone]   [facility]:  Posting 2 beds. COVID negative test req. Lower acuity only [phone]  [facility]:  Posting 0 beds. Low acuity only. No current aggression.   [facility]: Posting 0 beds. Low acuity. ECT Tx offered @ [place] site ONLY   [phone]   [facility]:  Posting 0 beds. NO reviews after 10PM. Low acuity only.    [facility]: Posting 5 beds. Low acuity only.   [facility] [place]: Posting 3 beds. No hx of aggression, sexual offense. [phone]     [facility]:  Posting 4 beds. Low acuity only. Must have the cognitive ability to do programming. No aggressive or violent behavior or recent HX. [phone]   [facility] [place]: Posting 0 beds. COVID negative test. Must be low acuity ONLY. [phone]    [facility]: Posting 2 beds. Low acuity. Negative Covid. [phone].   [facility]: Posting 0 beds. COVID negative test. [phone]   [facility]: Posting 1 bed. No hx of aggression/assault. No lines, drains or tubes.  Must have a ride home. [phone]   [facility]: Posting 5 beds. Mixed unit/Low acuity/no medical devices - Negative Covid. [phone].  [facility] [place]: Posting 20 beds. Out-of-State Facility. [phone]     Pt remains on worklist pending appropriate bed availability.

## 2024-08-05 NOTE — PROGRESS NOTES
Pt got up and out from sensory room D around 0200 and notified writer that she was feeling anxious and couldn't go to sleep; PRN atarax given per order. In addition, pt seemed to be confused; did not know where she was but was able to reorient. At one point, pt asked for her personal belongings and wanted to leave and stated that she needed to go home because she could not sleep on the unit; writer explained the reason why pt is here and asked if pt would like to leave immediately or if pt would be willing to wait until the morning; pt agreed to wait. Pt was in and out of sensory room, spent time in the milieu, and about 0530 pt laid down in the bench in the corner between chair 6 and 7 and stayed there for about an hour. Pt currently in chair 7 resting; was reminded to use assigned seat but non compliant at this time.

## 2024-08-05 NOTE — PROGRESS NOTES
Patient was informed that she is on a 72 hr. Hold, that she is not safe to discharge to her prior to living arrangement. Explained the rationale of the decision.

## 2024-08-05 NOTE — PROGRESS NOTES
"Triage & Transition Services, Extended Care     Therapy Progress Note    Patient: Fiorella goes by \"Fiorella,\" uses she/her pronouns  Date of Service: August 5, 2024  Site of Service: [facility] EMERGENCY DEPT                             EMP07  Patient was seen yes  Mode of Assessment: In person    Presentation Summary: Pt is observed to walking around milieu with her headphones. Pt continues to appear disorganized and confused. Pt reports recognizing writer upon approach, then hands the headphones to writer and states she accidentally turned them off but knows she is to be listening to them and dancing in order to leave. Pt willingly meets with writer for reassessment; Pt reports feeling more alert, but unclear what has helped. Pt then states she is listening to the music and references the headphones and ativan. Pt then asks about the cameras and if they are recording her. Writer explains the cameras are always on, but not specifically focused on Pt. Pt is visibly paranoid, and references the headphones as a reason the cameras don't need to be recording her. Writer asks if Pt is uncomfortable about something specific, and Pt states she does not want the recordings shared without her consent. Writer validated Pt, then shifted to assessing current anxiety, sleep, and overall understanding of why she is in the hospital. Pt states she did get some sleep, but won't report how much. Pt acknowledges taking ativan; when writer tried to explain concerns for possible catatonia, Pt became more paranoid. Writer asked about Pt's leve of anxiety or worry, and Pt states she is ready to be done talking to writer and ended session.    Therapeutic Intervention(s) Provided:      Current Symptoms: anxious  (unable to assess)  (unable to assess) forgetful, inattentive, flight of ideas, distractability loss of appetite    Mental Status Exam   Affect: Blunted, Flat  Appearance: Disheveled  Attention Span/Concentration: " Inattentive  Eye Contact: Variable    Fund of Knowledge:  (unable to assess)   Language /Speech Content: Fluent  Language /Speech Volume: Soft  Language /Speech Rate/Productions: Slow (quicker to respond, but continues to be delayed)  Recent Memory:  (unable to assess)  Remote Memory:  (unable to assess)  Mood: Anxious, Sad, Apathetic  Orientation to Person: Yes   Orientation to Place: Yes  Orientation to Time of Day:  (unable to assess)  Orientation to Date:  (unable to assess)     Situation (Do they understand why they are here?): Answer (please comment)  Psychomotor Behavior: Normal  Thought Content: Clear  Thought Form: Paranoia, Other (please comment) (confused and disorganized)    Treatment Objective(s) Addressed: rapport building, orienting the patient to therapy    Patient Response to Interventions: acceptance expressed, needs reinforcement, no evidence of understanding    Progress Towards Goals: Patient Reports Symptoms Are: ongoing  Patient Progress Toward Goals: is not making progress  Comment: Pt does appear more alert after taking ativan, but continues to present as disorganized, confused, and unable to fully understand what is happening.  Next Step to Work Toward Discharge:  (Pt is now on a 72HH and will be placed on inpatient waitlist)    Case Management: Case Management Included: collaborating with patient's support system  Details on Collaborating with Patient's Support System: Provided update to Pt's   Summary of Interaction: Updated Pt's  about updated plan with Pt recently placed on a 72HH and will be placed on inpatient waitlist. Pt's  agrees, expressed relief as he feels she is not stable to discharge home.    Plan: inpatient mental health  yes provider, WERNER Tavera, CNP, APRN  no    Clinical Substantiation: Pt has continued to struggle with disorganization, confusion, and poor sleep. Pt has asked to leave EmPATH, but does not appear to be safe to step down to a less  restrictive environment as she is at significant risk of managing her safety. Pt is highly vulnerable due to level of disorganization and confusion. Pt has continued to struggle with minimal sleep, and today presented as potentially experiencing symptoms of catatonia. The provider met with Pt, and started Pt on Ativan challenge, which Pt did have positive response to, but continues to display significant signs of disorganization, confusion, and paranoia. Pt has been placed on a 72HH and will be placed on the inpatient waitlist. In consultation with provider, Pt does not appear to be in need of pursuing commitment as she has previously successfully been able to stabilize while inpatient.    Legal Status: Legal Status at Admission: 72 Hour Hold  72 Hour Hold - Date/Time Initiated: 08/05/24 @1416  72 Hour Hold - Date/Time Ends: 08/08/24 @ 1416    Session Status: Time session started: 1314  Time session ended: 1323  Session Duration (minutes): 9 minutes  Session Number: 1  Anticipated number of sessions or this episode of care: 3  Date of most recent diagnostic assessment: 08/05/24    Time Spent: 9 minutes    CPT Code: CPT Codes: Non-Billable    Diagnosis:   Patient Active Problem List   Diagnosis Code    Mental health disorder F99    Sleep deprivation Z72.820    Bipolar 1 disorder (H) F31.9    Bipolar disorder, current episode depressed, severe, with psychotic features (H) F31.5    Suicide attempt by substance overdose (H) T65.92XA    Insomnia, unspecified type G47.00       Primary Problem This Admission: Active Hospital Problems    Insomnia, unspecified type      Bipolar 1 disorder (H)        TRENT ZAMUDIO   Licensed Mental Health Professional (LMHP), [facility]  [phone]

## 2024-08-05 NOTE — PROGRESS NOTES
Pt spent most of the shift resting. Pt would alternating places where she would takes naps between different recliners and sensory rooms. She was reminded to use her assigned recliner and the assigned sensory room. Pt was redirectable. Pt was compliant with medications and cares. She denied any SI or HI. Pt was visible on the unit. Gait slow but steady. Plan to stay obs and reassess in the morning.

## 2024-08-05 NOTE — ED NOTES
Pt approached nurses station and asked if she could have her phone. Writer told patient that personal belongings were not allowed on the unit but could use the phones located on unit. Pt then asked for all of her belongings and that she'd like to go home. Writer asked for confirmation that she would like to leave, to which pt stared at writer with no response. Writer told pt that request would be passed on to RN. RN notified.

## 2024-08-05 NOTE — ED PROVIDER NOTES
[facility] Unit - Psychiatric Consultation  [facility] Emergency Department    Fiorella Bartlett MRN: [id number]   Age: 38 year old YOB: 1985     History     Chief Complaint   Patient presents with    Psychiatric Evaluation     HPI  Fiorella Bartlett is a 38 year old female with a past history notable for a history of bipolar disorder who presented to the emergency department voluntarily with an increase in racing thoughts and insomnia. She was cleared medically and transferred to [facility] for additional assessment and treatment planning. Patient is nearing 39 hours in emergency care.     Patient seen for followup today.  She presents with a blunted affect and minimal spontaneous speech.  When asked about the events leading up to her emergency department visit, patient reports that she came here voluntarily.  She is unable to describe what prompted her to come into the emergency department.  She notes that she would like to go home at this point, and believes she would sleep better at home.  She reports in the past she has been able to get some meds in the emergency department which she can then take at home.  She believes trazodone up to 150 mg has been helpful in the past.  She reports she has been taking quetiapine 100 mg nightly, and sometimes will take an additional 100 mg as needed if experiencing difficulty sleeping.  She has seen a psychiatrist and says she has been refilling her medications regularly.  She is oriented to month and year, believing it to be August 3.  She was able to identify that she has at [facility].  She does endorse feeling slightly confused, states she is experiencing difficulty focusing and paying attention.  She is easily distracted, and is experiencing difficulty maintaining a linear train of thought.  She endorses a stable appetite, but noted by nursing staff to be eating minimally here on the unit.  She denies that she has spoken to her  today, but  "notes that she would be amenable to following his recommendation regarding staying an additional night or returning home this evening.    Past Medical History  Past Medical History:   Diagnosis Date    Breast cancer (H)     Depressive disorder      Past Surgical History:   Procedure Laterality Date    IR CHEST PORT PLACEMENT > 5 YRS OF AGE  11/7/2023     QUEtiapine (SEROQUEL) 100 MG tablet  Cholecalciferol (VITAMIN D3 PO)  levothyroxine (SYNTHROID/LEVOTHROID) 100 MCG tablet  lithium (ESKALITH CR/LITHOBID) 450 MG CR tablet      Allergies   Allergen Reactions    Pcn [Penicillins] Unknown     Has been told she was allergic since she was a child.      Family History  Family History   Problem Relation Age of Onset    Anxiety Disorder Sister      Social History   Social History     Tobacco Use    Smoking status: Never    Smokeless tobacco: Never   Substance Use Topics    Alcohol use: No    Drug use: No          Review of Systems  A medically appropriate review of systems was performed with pertinent positives and negatives noted in the HPI, and all other systems negative.    Physical Examination   BP: (!) 141/90  Pulse: 67  Temp: 97.8  F (36.6  C)  Resp: 16  SpO2: 98 %    Physical Exam  General: Appears stated age.   Neuro: Alert and fully oriented. Extremities appear to demonstrate normal strength on visual inspection.   Integumentary/Skin: no rash visualized, normal color    Psychiatric Examination   Appearance: awake, alert, adequately groomed, appeared as age stated, and casually dressed  Attitude:  cooperative and guarded  Eye Contact:  fair  Mood:   \"confused\"  Affect:  mood congruent and intensity is flat  Speech:   low volume, mildly delayed responses  Psychomotor Behavior:  no evidence of tardive dyskinesia, dystonia, or tics. Psychomotor retardation observed  Thought Process:   slowed, mildly disorganized, paucity of thought  Associations:  no loose associations  Thought Content:   denies SI/HI. Denies AH/VH. " Denies paranoia or delusions.   Insight:  fair  Judgement:  fair  Oriented to:  time, person, and place  Attention Span and Concentration:  fair  Recent and Remote Memory:  fair  Language: able to name/identify objects without impairment  Fund of Knowledge: intact with awareness of current and past events    ED Course        Labs Ordered and Resulted from Time of ED Arrival to Time of ED Departure   LITHIUM LEVEL - Abnormal       Result Value    Lithium 0.45 (*)    TSH WITH FREE T4 REFLEX - Abnormal    TSH 24.61 (*)    T4 FREE - Abnormal    Free T4 0.82 (*)    TSH WITH FREE T4 REFLEX - Abnormal    TSH 29.64 (*)    T4 FREE - Abnormal    Free T4 0.68 (*)        Assessments & Plan (with Medical Decision Making)   Patient presenting with worsening insomnia, confusion, and lack of ability to function at her baseline. Hx of bipolar I disorder. TSH checked, noted to be high, T4 low. Synthroid restarted. Will plan to increase HS quetiapine and continue lithium at current dose while rechecking a level tomorrow morning. Will trial lorazepam for symptoms of catatonia. Nursing notes reviewed noting no acute issues.     I have reviewed the assessment completed by the [facility].     Preliminary diagnosis:    ICD-10-CM    1. Bipolar I disorder, most recent episode depressed (H)  F31.30       2. Insomnia, unspecified type  G47.00    3.      R/o catatonia        Treatment Plan:  - Will offer lorazepam 1 mg once now. Patient presenting with possible mild catatonia symptoms with delayed responses, flat affect, poor PO intake. Will assess response to lorazepam and schedule lorazepam 0.5 mg tid if demonstrating positive response.   - Will schedule quetiapine 200 mg at bedtime for insomnia and mood stabilization.  - Continue lithium  mg at bedtime for mood stabilization in conjunction with quetiapine  - Reviewed TSH and T4, TSH elevated at 29.64 and T4 low at 0.68. Levothyroxine has been restarted at 100 mcg daily. Recommend patient  follow-up upon discharge for ongoing monitoring.   - Lithium level was 0.45, drawn last evening around 2045. Will repeat 12 hour level tomorrow morning.   Given grossly disorganized thought process, confusion, and lack of insight, will place patient on a 72 hour hold. Plan to pursue inpatient psychiatric hospitalization for further stabilization. If not responding to above interventions, we may need to pursue commitment.     --  Karlos Tavera CNP   [facility] EMERGENCY DEPT  EmPATH Unit        Karlos Tavera CNP  08/05/24 [id number]

## 2024-08-05 NOTE — ED NOTES
"Pt approached writer and asked \"can you tell me what's going on?\". Writer reoriented pt that they were on the EmPATH unit. Pt gave a thumbs up and returned to Sensory Room.  "

## 2024-08-05 NOTE — TELEPHONE ENCOUNTER
S: ZAC John  Marguerite  calling at 3:20 PM  about a 38 year old/Female presenting with Paranoia, Lack of sleep.     B: Pt arrived via Family. Presenting problem, stressors: Pt has hx of bi polar, presents with several days of not being able to sleep. Presents with being confused, paranoid and disorganized and not being able to sleep. [facility]; pt on 72, poses risk to self if she leaves. Needs more stabilization.    Pt affect in ED: Blunted and Flat  Pt Dx: Bipolar Disorder  Previous IP[facility] hx? Yes: 2019, last in 2021  Pt denies SI   Hx of suicide attempt? No  Pt denies SIB  Pt denies HI   Pt denies hallucinations . Has some inappropriate affect  Pt RARS Score: 4    Hx of aggression/violence, sexual offenses, legal concerns, Epic care plan? describe: No  Current concerns for aggression this visit? No  Does pt have a history of Civil Commitment? No  Is Pt their own guardian? Yes    Pt is prescribed medication. Is patient medication compliant? Yes  Pt denies OP services   CD concerns: None  Acute or chronic medical concerns: Pt completed breast cancer tx recently, no ports   Does Pt present with specific needs, assistive devices, or exclusionary criteria? None      Pt is ambulatory  Pt is able to perform ADLs independently      A: Pt to be reviewed for [facility] admission. Pt is on a 72HH, initiated 08/05/2024 @14:16  Preferred placement: Statewide    COVID Symptoms: No  If yes, COVID test required   Utox: Ordered, not yet collected   CMP: N/A  CBC: WNL  HCG: Ordered, not yet collected    R: Patient cleared and ready for behavioral bed placement: Yes  Pt placed on [facility] worklist? Yes    Does Patient need a Transfer Center request created? Yes, writer completed Transfer Center request at:  3:38 pm

## 2024-08-05 NOTE — PROGRESS NOTES
Patient approached the nurses station and tried to go into the office to get her bag because she is ready to go. She appeared to be very confused and disorganized and when the writer tried to explain things but the patient abruptly cut the conversation.

## 2024-08-05 NOTE — PROGRESS NOTES
Triage & Transition Services, Extended Care     Client Name: Fiorella Bartlett    Date: August 5, 2024    Patient was seen    Mode of Assessment:      Service Type: (P) attended group session  Session Start Time:  (P) 1040    Session End Time: (P) 1110  Session Length: (P) 30  Site Location: [facility] EMERGENCY DEPT                             EMP07  Total Number ofAttendees: (P) 3  Topic:   (P) coping skills/lifestyle management   Response: (P) cooperative with task, other (see comments) (Limited responses.  Got up from the table at one point.)     Jojo Salvador, [facility]   Licensed Mental Health Professional (LMHP), Extended Care  [phone]

## 2024-08-06 ENCOUNTER — TELEPHONE (OUTPATIENT)
Dept: BEHAVIORAL HEALTH | Facility: CLINIC | Age: 39
End: 2024-08-06
Payer: COMMERCIAL

## 2024-08-06 ENCOUNTER — HOSPITAL ENCOUNTER (INPATIENT)
Facility: CLINIC | Age: 39
LOS: 8 days | Discharge: HOME | DRG: 885 | End: 2024-08-14
Attending: PSYCHIATRY & NEUROLOGY | Admitting: PSYCHIATRY & NEUROLOGY
Payer: COMMERCIAL

## 2024-08-06 VITALS
DIASTOLIC BLOOD PRESSURE: 87 MMHG | OXYGEN SATURATION: 99 % | HEART RATE: 71 BPM | SYSTOLIC BLOOD PRESSURE: 139 MMHG | RESPIRATION RATE: 16 BRPM | TEMPERATURE: 97.1 F

## 2024-08-06 DIAGNOSIS — F31.5 BIPOLAR DISORDER, CURRENT EPISODE DEPRESSED, SEVERE, WITH PSYCHOTIC FEATURES (H): Primary | ICD-10-CM

## 2024-08-06 DIAGNOSIS — F31.9 BIPOLAR 1 DISORDER (H): ICD-10-CM

## 2024-08-06 DIAGNOSIS — E03.9 HYPOTHYROIDISM, UNSPECIFIED TYPE: ICD-10-CM

## 2024-08-06 DIAGNOSIS — K59.00 CONSTIPATION, UNSPECIFIED CONSTIPATION TYPE: ICD-10-CM

## 2024-08-06 PROCEDURE — 99255 IP/OBS CONSLTJ NEW/EST HI 80: CPT

## 2024-08-06 PROCEDURE — 250N000013 HC RX MED GY IP 250 OP 250 PS 637: Performed by: NURSE PRACTITIONER

## 2024-08-06 PROCEDURE — G0378 HOSPITAL OBSERVATION PER HR: HCPCS

## 2024-08-06 PROCEDURE — 124N000002 HC R&B MH UMMC

## 2024-08-06 RX ORDER — TRAZODONE HYDROCHLORIDE 50 MG/1
50 TABLET, FILM COATED ORAL
Status: DISCONTINUED | OUTPATIENT
Start: 2024-08-06 | End: 2024-08-14 | Stop reason: HOSPADM

## 2024-08-06 RX ORDER — LEVOTHYROXINE SODIUM 50 UG/1
100 TABLET ORAL
Status: DISCONTINUED | OUTPATIENT
Start: 2024-08-07 | End: 2024-08-07

## 2024-08-06 RX ORDER — QUETIAPINE FUMARATE 100 MG/1
100 TABLET, FILM COATED ORAL AT BEDTIME
Status: DISCONTINUED | OUTPATIENT
Start: 2024-08-06 | End: 2024-08-07

## 2024-08-06 RX ORDER — LITHIUM CARBONATE 450 MG
450 TABLET, EXTENDED RELEASE ORAL AT BEDTIME
Status: DISCONTINUED | OUTPATIENT
Start: 2024-08-06 | End: 2024-08-08

## 2024-08-06 RX ORDER — MAGNESIUM HYDROXIDE/ALUMINUM HYDROXICE/SIMETHICONE 120; 1200; 1200 MG/30ML; MG/30ML; MG/30ML
30 SUSPENSION ORAL EVERY 4 HOURS PRN
Status: DISCONTINUED | OUTPATIENT
Start: 2024-08-06 | End: 2024-08-14 | Stop reason: HOSPADM

## 2024-08-06 RX ORDER — ACETAMINOPHEN 325 MG/1
650 TABLET ORAL EVERY 4 HOURS PRN
Status: DISCONTINUED | OUTPATIENT
Start: 2024-08-06 | End: 2024-08-14 | Stop reason: HOSPADM

## 2024-08-06 RX ORDER — HYDROXYZINE HYDROCHLORIDE 25 MG/1
25 TABLET, FILM COATED ORAL EVERY 6 HOURS PRN
Status: DISCONTINUED | OUTPATIENT
Start: 2024-08-06 | End: 2024-08-07

## 2024-08-06 RX ORDER — OLANZAPINE 5 MG/1
5-10 TABLET ORAL 3 TIMES DAILY PRN
Status: DISCONTINUED | OUTPATIENT
Start: 2024-08-06 | End: 2024-08-14 | Stop reason: HOSPADM

## 2024-08-06 RX ORDER — OLANZAPINE 10 MG/2ML
10 INJECTION, POWDER, FOR SOLUTION INTRAMUSCULAR 3 TIMES DAILY PRN
Status: DISCONTINUED | OUTPATIENT
Start: 2024-08-06 | End: 2024-08-14 | Stop reason: HOSPADM

## 2024-08-06 RX ADMIN — ACETAMINOPHEN 650 MG: 325 TABLET, FILM COATED ORAL at 12:51

## 2024-08-06 RX ADMIN — LITHIUM CARBONATE 450 MG: 450 TABLET, EXTENDED RELEASE ORAL at 20:34

## 2024-08-06 RX ADMIN — TRAZODONE HYDROCHLORIDE 50 MG: 50 TABLET ORAL at 23:17

## 2024-08-06 RX ADMIN — LORAZEPAM 0.5 MG: 0.5 TABLET ORAL at 15:57

## 2024-08-06 RX ADMIN — HYDROXYZINE HYDROCHLORIDE 25 MG: 25 TABLET, FILM COATED ORAL at 23:17

## 2024-08-06 RX ADMIN — QUETIAPINE FUMARATE 100 MG: 100 TABLET ORAL at 20:34

## 2024-08-06 RX ADMIN — LORAZEPAM 0.5 MG: 0.5 TABLET ORAL at 09:07

## 2024-08-06 RX ADMIN — LEVOTHYROXINE SODIUM 100 MCG: 100 TABLET ORAL at 09:07

## 2024-08-06 ASSESSMENT — ACTIVITIES OF DAILY LIVING (ADL)
ADLS_ACUITY_SCORE: 37
ADLS_ACUITY_SCORE: 37
ADLS_ACUITY_SCORE: 55
ADLS_ACUITY_SCORE: 37
ADLS_ACUITY_SCORE: 57
ADLS_ACUITY_SCORE: 37
ADLS_ACUITY_SCORE: 45
ADLS_ACUITY_SCORE: 37
ADLS_ACUITY_SCORE: 45
ADLS_ACUITY_SCORE: 37
ADLS_ACUITY_SCORE: 55
ORAL_HYGIENE: INDEPENDENT;PROMPTS
ADLS_ACUITY_SCORE: 37
HYGIENE/GROOMING: INDEPENDENT;PROMPTS
ADLS_ACUITY_SCORE: 37

## 2024-08-06 ASSESSMENT — LIFESTYLE VARIABLES: SKIP TO QUESTIONS 9-10: 1

## 2024-08-06 NOTE — ED NOTES
"Patient attempted to go in workstation, trying the door, stating \"I need my phone, I need to call Ronna\", patient redirected, went to call .   "

## 2024-08-06 NOTE — PHARMACY-ADMISSION MEDICATION HISTORY
Please see Admission Medication History note completed on 8/6 under previous encounter for information regarding prior to admission medications.    Carito Silver, PharmD  *[phone]

## 2024-08-06 NOTE — ED NOTES
"Pt awake and laying in the lt lateral position in bed. Writer asked to get her vitals pt declined and stated \"No. My vitals are okay let's do it after I eat breakfast. \" Reports pt.   "

## 2024-08-06 NOTE — TELEPHONE ENCOUNTER
7:26 AM Per call to  Direct PT can reviewed at [facility] for placement. Intake to fax clinical.  7:55 AM Fax sent to [facility] for review  9:03 AM [facility] declined PT d/t acuity. WL updated.  9:04 AM Writer provided MRN to [facility] CRN for review, awaiting callback.  10:56 AM [facility] declined d/t pts acuity needs ICU bed.  12:42 PM Writer left message with unit 12 for CRN to review PT MRN for admission, awaiting callback.  12:48 PM Paged Mikel  1:15 PM Per Mikel pt will need to not have any physical aggression for 24 hrs, will need to be calm and can rereview tomorrow. Also Petition for commitment will need to be submitted.  1:24 PM Paged Dr. Murdock for escalation.  1:31 PM Paged Dr. Pérez for escalation.  1:31 PM Provided MRN to unit 12 CRN for review, awaiting callback.  1:34 PM Per EC Coordinator following PT actively determining if Petition for Commitment will be filed, awaiting update.  2:19 PM PT accepted to unit 10/[facility] via chat by Dr. Pérez.  2:22 PM Unit 10 CRN informed of pt in queue. Pt will need 1:1 staffing requested and will admit on evenings.  2:25 PM [facility] ED notified. Will call for RN follow at 4pm.  2:29 PM Indicia complete. Pt added to admit board.      [facility] Access Inpatient Bed Call Log 8/6/2024 8:12:15 AM    Intake has called facilities that have not updated their bed status within the last 12 hours.    ADULTS:  *[facility] is posting 0 beds.              [facility] is posting 0 beds. [phone], APS: [phone]    [facility] is posting 0 beds. [phone]              [facility] is posting 0 beds. [phone] 8:21A [facility], SDU BEDS ONLY.  [facility] is posting 2 beds. [phone]   [facility] (Ages 18-35) is posting 6 beds. Negative COVID test required, no recent or significant aggression, violence, or sexual assault. [phone] 8:13A PER DEYVI, COUPLE OF CHILD CURRENTLY UNDER REVIEW FOR, FEW ADOL NO EVERT, AND A FEW YA.  [facility] is posting 0  beds. [phone]            [facility] is posting 0 beds. [phone]     [facility] is posting 0 beds. [phone]         Pt remains on work list pending appropriate bed availability.      *[facility] is posting 4 beds. Mixed unit 12+. Low acuity only.  DIRECT: [phone]      [facility] is positing 0 beds. No aggression.  [phone]         [facility] is posting 0 beds. [phone]   [facility] is posting 3 beds. Low acuity only. [phone]      [facility] is posting 1 beds. Low acuity. No current aggression. [phone]   [facility] is posting 0 beds. Low acuity. [phone]   [facility] is posting 1 beds. 72 HH preferred. Low acuity. [phone] 8:22A PER KARRIE PEARL FAX INFORMATION AS TEAM IS IN STAFFING.  [facility] is posting 5 beds. Low acuity. [phone]      [facility] is posting 3 beds. Vol only, No Hx of aggression, violence, or assault. No sexual offenders. No 72 hr holds. [phone]     [facility] is posting 4 beds. (Must have the cognitive ability to do programming. No aggressive or violent behavior or recent HX in the last 2 yrs. MH must be primary.) [phone]   is posting 0 beds. Low acuity only. Violence and aggression capped. [phone]     [facility] is posting 2 beds. Low acuity, Neg Covid. [phone] 8:23A POTENTIAL BED AVAILABILITY, WILL KNOW MORE AFTER THEIR BED MEETING.  [facility] posting 0 beds. Negative covid.       [facility] is posting 1 beds. [phone] no wounds, lines, drains, C-paps, tubes and must be able to care for themselves; no heavy hx of aggression. Pt also needs a confirmed ride from facility upon d/c. 8:21A 1 BED AVAILABLE.  [facility] is posting 20 beds. Call for details. [phone] OUT OF  [facility] 8:12A 8 FOR PEDS, 25 FOR ADULTS.  [facility] is posting 5 beds. Mixed unit.  [phone]      Pt remains on work list pending appropriate bed availability.

## 2024-08-06 NOTE — PHARMACY-ADMISSION MEDICATION HISTORY
Pharmacist Admission Medication History    Admission medication history is complete. The information provided in this note is only as accurate as the sources available at the time of the update.    Information Source(s): Patient, Family member, and CareEverywhere/SureScripts via in-person    Pertinent Information:  (Daniel) helped with medications. Per pt  pt has been taking 200mg quetiapine for the past week to help with sleep.    Changes made to PTA medication list:  Added: None  Deleted: None  Changed: Quetiapine 100mg at bedtime --> 200 mg qhs    Allergies reviewed with patient and updates made in EHR: yes    Medication History Completed By: Samantha Pace RP 8/6/2024 3:39 PM    PTA Med List   Medication Sig Last Dose    Cholecalciferol (VITAMIN D3 PO) Take 1 tablet by mouth daily  Past Week    levothyroxine (SYNTHROID/LEVOTHROID) 100 MCG tablet Take 100 mcg by mouth daily Past Week    lithium (ESKALITH CR/LITHOBID) 450 MG CR tablet Take 450 mg by mouth at bedtime Past Week    QUEtiapine (SEROQUEL) 100 MG tablet Take 200 mg by mouth at bedtime Past Week

## 2024-08-06 NOTE — TELEPHONE ENCOUNTER
[place]: [place]  Access Inpatient Bed Call Log  8/6/2024  @12:30 AM:  Intake has called facilities that have not updated their bed status within the last 12 hours.??      [facility]: @ cap per website.  [facility]:  @Cap per website.   [facility]: @ Cap per website.  [facility]: @ Cap per website. - 7:46 AM Per Fide, they are able to review for low acuity.  [facility]: @ Cap per website.  [facility]: Posting 2 beds. Per call with Kadi @3:39 PM they are reviewing for all beds  [facility] @ posting 6 beds. Ages 18-35, Voluntary only, COVID test req'd, NO aggression, physical or sexual assault, violence hx or drug abuse, or psychosis  [facility]: @ Cap per website.  [place] [facility]: @ cap per website.  [place] [facility]:  @ Cap per website.     [facility]: @ Posting 4 beds.  Mixed unit/Low acuity only.  [facility]: @ Cap per website. Low acuity, No aggression  [facility]: @ cap per website.  [facility]:  @ Posting 1 bed. Low acuity only. No current aggression.  [facility]:  @ Posting 3 bed. COVID negative test req. Lower acuity only. No Aggression.  [facility]: @ Cap per website. Low acuity only. Prefer med adjustments placement.  . No aggression  [facility]:  @ Posting 5 beds. COVID negative test req. Lower acuity only. No Aggression.  [place] [facility]: @Cap per website. NO reviews after 10PM. Low acuity only.  - 12:54 AM At capacity call back after 7:30 AM.  [facility]: @ Posting 3 bed. No hx of aggression. No sexual offenders. Voluntary patients only 12:54 AM Per [place] 3 beds available.  [facility]:  @ Posting 4 beds. Low acuity only. Must have the cognitive ability to do programming. No aggressive or violent behavior or recent HX in the last 2 yrs. MH must be primary.  [facility]: @ Cap per website. COVID negative test. Must be low acuity ONLY.  [facility]: @ Posting 2 beds. Low acuity.  Negative Covid. - 7:50 AM Per Keren, they are not able to review, but intake should call back this afternoon.  [facility]: @ Cap per website.  Negative COVID test  [facility]: @ Posting 1 beds. No hx of aggression/assault. No lines, drains or tubes. Does not provide detox or CD treatment.  [facility] : @ Posting 5 beds. Mixed unit/Low acuity/no medical devices - IV, CPAP etc. Negative Covid.). - 7:42 AM Per F[facility], They have 1 high acuity and general unit beds avail.      Pt remains on waitlist pending appropriate placement availability.

## 2024-08-06 NOTE — ED NOTES
"Patient pacing, disorganized. Sitting in different recliner. Redirected patient, offered 8 pm dose of ativan, patient stated, \"I'm calm\", explained to patient this was scheduled 0.5 mg dose of ativan, which patient then accepted.   "

## 2024-08-06 NOTE — ED PROVIDER NOTES
Signout taken from Dr. Gomez.  Patient has been boarding in the ER awaiting an inpatient mental health bed.  She is on a 72-hour hold.  She apparently failed [facility].  No events during my shift.     Colby Cook MD  08/06/24 [id number]

## 2024-08-06 NOTE — ED PROVIDER NOTES
Patient sent over from empath because becoming agitated.  She is waiting for inpatient psych hospitalization.  Is on a 72-hour hold and it looks like may need commitment     Shelbi Gomez MD  08/06/24 [id number]

## 2024-08-06 NOTE — ED NOTES
Patient noted that her ride is here and gathered her stuff and walked to the door. Patient was informed that her ride is not here. Pt confused.

## 2024-08-06 NOTE — ED NOTES
"Patient arrives from empath unit after increased agitation and trying to elope despite reminders she is on a hold. Here patient is confused. She believes her  is on the way to pick her up. When asked how she feels she says \"awake\". Patient tried coming in to staff area and stated she worked here. Required security presence to go back in to hallway.   "

## 2024-08-06 NOTE — ED NOTES
"Pt observed on camera, rolled herself out of bed and got back into bed. Writer in at the bedside  pt immediately stated \" I did not fall. I rolled out of bed to look for my bottle of water under the bed\" Pt concluded by requesting \"some ice water\" She was given the water as requested . The provider notified.   "

## 2024-08-06 NOTE — ED NOTES
still at bedside.  Kerry, psych NP, at bedside and recommending commitment.  Jluis is reviewing case

## 2024-08-06 NOTE — ED NOTES
Pt was trying to leave and pulling at the door. Pt yelling out security Staff tried to attempt to redirect her however pt was increasingly more agitated and was making nonsensical statements. Pt was demanding to leave and get to her  as she was convinced he was outside the doors. Pt attempted to be redirected by staff and pt made a closed fist and then scratched staff. Pt was helped by staff to make a phone call to her . Charge notified ED charge of plan to transport pt back to ED.  RN notified of plan to transport pt back to ED  .

## 2024-08-06 NOTE — PROGRESS NOTES
Triage & Transition Services, Extended Care     Client Name: Fiorella Bartlett    Date: August 5, 2024    Patient was seen yes  Mode of Assessment: In person    Service Type: (P) refused to attend group session  Session Start Time:  (P) 1940    Session End Time: (P) 2000  Session Length: (P) 20  Site Location: [facility] EMERGENCY DEPT                             [id number]  Total Number ofAttendees: (P) 1  Topic:   (P) coping skills/lifestyle management   Response: (P) other (see comments) (pt declined to participate)     [facility]ya [facility]   Licensed Mental Health Professional (LMHP), Extended Care  [phone]

## 2024-08-06 NOTE — ED NOTES
Patient approached desk, swore at staff, later patient tried to open doors, yelling and trying to leave.

## 2024-08-06 NOTE — ED NOTES
"Asked patient if she would like a warm blanket, patient stated, \"I think I need a different nurse\", and pointed to her head.   "

## 2024-08-06 NOTE — ED NOTES
Pt attempted to enter nursing work station again. Pt stating she needs to see her . Pt was redirected to the phone to call him.

## 2024-08-06 NOTE — PROGRESS NOTES
"Triage & Transition Services, Extended Care     Therapy Progress Note    Patient: Fiorella goes by \"Fiorella,\" uses she/her pronouns  Date of Service: August 6, 2024  Site of Service: [facility] EMERGENCY DEPT                             BH3  Patient was seen yes  Mode of Assessment: In person    Presentation Summary: Writer presented to Pt's room to engage in therapeutic check-in. Writer introduced self and stated purpose of check-in. Pt stated she lost the paper with her contact information for her  and sister. Writer and Pt looked around Pt's belongings, bed, and milieu area for the paper. Writer provided new paper with contact information. When prompted by Writer, Pt stated she wants to leave the hospital. Writer identified current plan of staying in the hospital due to mental health concerns. Pt continued to reiterate desire to discharge. Writer validated Pt's request, yet stated at this time Pt would benefit from staying in the hospital. Pt then reached for the room telephone and Writer terminated the session.    Therapeutic Intervention(s) Provided: Engaged in social skills training.    Current Symptoms: anxious  (unable to assess)  (unable to assess) forgetful, inattentive, flight of ideas, distractability loss of appetite    Mental Status Exam   Affect: Flat  Appearance: Appropriate  Attention Span/Concentration: Other (please comment) (needing prompts)  Eye Contact: Avoidant    Fund of Knowledge: Other (please comment) (unable to assess)   Language /Speech Content: Fluent  Language /Speech Volume: Soft  Language /Speech Rate/Productions: Slow  Recent Memory: Other (please comment) (unable to assess)  Remote Memory: Other (please comment) (unable to assess)  Mood: Sad, Anxious, Apathetic  Orientation to Person: Yes   Orientation to Place: Yes  Orientation to Time of Day: No (did not assess given presentation)  Orientation to Date: No (did not assess given presentation)     Situation (Do " they understand why they are here?): No (did not assess given presentation)  Psychomotor Behavior: Normal  Thought Content: Clear  Thought Form: Obsessive/Perseverative, Other (please comment) (disorganized. repetitive statements of wanting to discharge.)    Treatment Objective(s) Addressed: rapport building, identifying treatment goals    Patient Response to Interventions: acceptance expressed    Progress Towards Goals: Patient Reports Symptoms Are: ongoing  Patient Progress Toward Goals: is not making progress  Comment: Pt does appear more alert after taking ativan, but continues to present as disorganized, confused, and unable to fully understand what is happening. Chart review indicates Pt had to be transferred to the ED from [facility] due to attempting to leave the area several times. Chart review also indicates Pt stated she was leaving this AM with her belongings and her ride was here to pick her up, yet no one was present to do so.  Next Step to Work Toward Discharge: symptom stabilization  Symptom Stabilization Comment: Continue to assess for severity of symptoms.    Case Management: Case Management Included: participating in a care conference on patient's behalf  Details on Collaborating with Patient's Support System: Provided update to Pt's   Details on participating in a care conference on patient's behalf: Collaborated with Kerry Bolton [facility]  Summary of Interaction: Will be pursuing commitment for Pt.    Plan: inpatient mental health  yes provider, RN Dr. Cook  no    Clinical Substantiation:     At this time IP MH admission is being recommended due to continued displays of disorganization and confusion with no apparent improvement in symptoms from medication management. Pt was not able to fully safety plan with writer. Pt does appear to be at higher risk of death by suicide accidental or intentional due to mental health hx and substance use sx. If Pt is able to effectively safety plan  and/or Pts sx improve it would be beneficial to pursue a less restrictive alternative.     Pt does appear more alert after taking ativan, but continues to present as disorganized, confused, and unable to fully understand what is happening. Chart review indicates Pt had to be transferred to the ED from [facility] due to attempting to leave the area several times. Chart review also indicates Pt stated she was leaving this AM with her belongings and her ride was here to pick her up, yet no one was present to do so.    Collaborated with ADAMS Hernandez and will be pursuing commitment for Pt.    Legal Status: Legal Status at Admission: 72 Hour Hold  72 Hour Hold - Date/Time Initiated: 08/05/24 @1416  72 Hour Hold - Date/Time Ends: 08/08/24 @ 1416    Session Status: Time session started: 1025  Time session ended: 1037  Session Duration (minutes): 12 minutes  Session Number: 2  Anticipated number of sessions or this episode of care: 3  Date of most recent diagnostic assessment: 08/05/24    Time Spent: 12 minutes    CPT Code: CPT Codes: Non-Billable    Diagnosis:   Patient Active Problem List   Diagnosis Code    Mental health disorder F99    Sleep deprivation Z72.820    Bipolar 1 disorder (H) F31.9    Bipolar disorder, current episode depressed, severe, with psychotic features (H) F31.5    Suicide attempt by substance overdose (H) T65.92XA    Insomnia, unspecified type G47.00       Primary Problem This Admission: Active Hospital Problems    Insomnia, unspecified type      Bipolar 1 disorder (H)        [facility]   Licensed Mental Health Professional (LMHP), Extended Care  [phone]

## 2024-08-06 NOTE — CONSULTS
Initial Psychiatric Consult   Consult date: August 6, 2024         Reason for Consult, requesting source:    72HH  Requesting source: Colby Cook    Labs and imaging reviewed. Patient seen and evaluated by SHANON Fan CNP          HPI:   Fiorella Bartlett is a 38 year old female who presents to ED 8/3 with concern of racing thoughts.  Patient has had significant insomnia. Per Pt's , Pt has struggled to sleep for the past several days, and has become increasingly disorganized, confused, and at times displayed inappropriate affect.     Patient originally was transferred to [facility] but was disorganized and attempted to elope from Em[facility] prompting 72HH and transfer to [facility]. She was seen by psychiatric empath provider and started on Ativan for possible catatonia.     On interview, patient was knocking on the glass, staring at provider. She was seen sitting on the floor of her room. She was unable to verbalize how she felt besides stating she wanted to go home.         Past Psychiatric History:   Pt was diagnosed with Bipolar in 2019. Pt has been prescribed Lithium and Seroquel and reports she has been compliant with medications. Pt shares she has previous suicide attempts, with most recent back in 2014         Substance Use and History:   denies        Past Medical History:   PAST MEDICAL HISTORY:   Past Medical History:   Diagnosis Date    Breast cancer (H)     Depressive disorder        PAST SURGICAL HISTORY:   Past Surgical History:   Procedure Laterality Date    IR CHEST PORT PLACEMENT > 5 YRS OF AGE  11/7/2023             Family History:   FAMILY HISTORY:   Family History   Problem Relation Age of Onset    Anxiety Disorder Sister        Family Psychiatric History: denies        Social History:   SOCIAL HISTORY:   Social History     Tobacco Use    Smoking status: Never    Smokeless tobacco: Never   Substance Use Topics    Alcohol use: No       Supportive           Physical ROS:   The  10 point Review of Systems is negative other than noted in the HPI or here.           Medications:     Current Facility-Administered Medications   Medication Dose Route Frequency Provider Last Rate Last Admin    levothyroxine (SYNTHROID/LEVOTHROID) tablet 100 mcg  100 mcg Oral Daily Amber Joyce APRN CNP   100 mcg at 08/06/24 0907    lithium (ESKALITH CR/LITHOBID) CR tablet 450 mg  450 mg Oral At Bedtime Amber Joyce APRN CNP   450 mg at 08/05/24 2100    LORazepam (ATIVAN) tablet 0.5 mg  0.5 mg Oral TID RigoeiKarlos collins, CNP   0.5 mg at 08/06/24 0907    QUEtiapine (SEROquel) tablet 200 mg  200 mg Oral At Bedtime Frohreich, Karlos, CNP   200 mg at 08/05/24 2100              Allergies:     Allergies   Allergen Reactions    Pcn [Penicillins] Unknown     Has been told she was allergic since she was a child.           Labs:     Recent Results (from the past 48 hour(s))   TSH with free T4 reflex    Collection Time: 08/04/24  7:44 PM   Result Value Ref Range    TSH 29.64 (H) 0.30 - 4.20 uIU/mL   T4 free    Collection Time: 08/04/24  7:44 PM   Result Value Ref Range    Free T4 0.68 (L) 0.90 - 1.70 ng/dL   Comprehensive metabolic panel    Collection Time: 08/04/24  7:44 PM   Result Value Ref Range    Sodium 138 135 - 145 mmol/L    Potassium 4.0 3.4 - 5.3 mmol/L    Carbon Dioxide (CO2) 23 22 - 29 mmol/L    Anion Gap 11 7 - 15 mmol/L    Urea Nitrogen 11.7 6.0 - 20.0 mg/dL    Creatinine 0.79 0.51 - 0.95 mg/dL    GFR Estimate >90 >60 mL/min/1.73m2    Calcium 9.5 8.8 - 10.4 mg/dL    Chloride 104 98 - 107 mmol/L    Glucose 100 (H) 70 - 99 mg/dL    Alkaline Phosphatase 62 40 - 150 U/L    AST 21 0 - 45 U/L    ALT 18 0 - 50 U/L    Protein Total 6.6 6.4 - 8.3 g/dL    Albumin 4.5 3.5 - 5.2 g/dL    Bilirubin Total 0.2 <=1.2 mg/dL   CBC with platelets    Collection Time: 08/05/24  2:51 PM   Result Value Ref Range    WBC Count 6.0 4.0 - 11.0 10e3/uL    RBC Count 4.29 3.80 - 5.20 10e6/uL    Hemoglobin 13.3 11.7 - 15.7 g/dL    Hematocrit  "38.8 35.0 - 47.0 %    MCV 90 78 - 100 fL    MCH 31.0 26.5 - 33.0 pg    MCHC 34.3 31.5 - 36.5 g/dL    RDW 11.9 10.0 - 15.0 %    Platelet Count 214 150 - 450 10e3/uL   Asymptomatic COVID-19 Virus (Coronavirus) by PCR Nasopharyngeal    Collection Time: 08/05/24  4:25 PM    Specimen: Nasopharyngeal; Swab   Result Value Ref Range    SARS CoV2 PCR Negative Negative          Physical and Psychiatric Examination:     BP (!) 124/92   Pulse 83   Temp 98.1  F (36.7  C) (Oral)   Resp 16   SpO2 100%   Weight is 0 lbs 0 oz  There is no height or weight on file to calculate BMI.    Physical Exam:  I have reviewed the physical exam as documented by by the medical team and agree with findings and assessment and have no additional findings to add at this time.    Mental Status Exam:  Appearance: awake, alert, slightly disheveled, appeared as age stated, and casually dressed  Attitude:  cooperative and guarded  Eye Contact:  fair  Mood:   \"confused\"  Affect:  mood congruent and intensity is flat  Speech:   low volume, mildly delayed responses  Psychomotor Behavior:  no evidence of tardive dyskinesia, dystonia, or tics. Psychomotor retardation observed  Thought Process:   slowed, mildly disorganized, paucity of thought  Associations:  no loose associations  Thought Content:   denies SI/HI. Denies AH/VH. Denies paranoia or delusions.   Insight:  fair  Judgement:  fair  Oriented to:  time, person, and place  Attention Span and Concentration:  fair  Recent and Remote Memory:  fair  Language: able to name/identify objects without impairment  Fund of Knowledge: intact with awareness of current and past events               DSM-5 Diagnosis:   Bipolar 1 disorder mixed           Assessment:   Patient presenting with worsening insomnia, confusion, and lack of ability to function at her baseline. Hx of bipolar I disorder. TSH checked, noted to be high, T4 low. Synthroid restarted. Lithium level subtherapeutic, however concerned about her " hypothyroidisms, can consider adjusting in inpatient psychiatry.           Summary of Recommendations:     Increase seroquel to 250mg for insomnia and mood stabilization.   Continue Lithium 450mg at bedtime for now given hypothyroidism  Continue 72HH -- given continued grossly disorganized thought process, confusion, and lack of insight will petition for MI commitment to ensure patient can transfer to IP psychiatry       Kerry Bolton, PMOLGAP-BC  Consult/Liaison Psychiatry   [facility]

## 2024-08-07 ENCOUNTER — MEDICAL CORRESPONDENCE (OUTPATIENT)
Dept: EMERGENCY MEDICINE | Facility: CLINIC | Age: 39
End: 2024-08-07
Payer: COMMERCIAL

## 2024-08-07 LAB
AMPHETAMINES UR QL SCN: NORMAL
BARBITURATES UR QL SCN: NORMAL
BENZODIAZ UR QL SCN: NORMAL
BZE UR QL SCN: NORMAL
CANNABINOIDS UR QL SCN: NORMAL
FENTANYL UR QL: NORMAL
HCG UR QL: NEGATIVE
OPIATES UR QL SCN: NORMAL
PCP QUAL URINE (ROCHE): NORMAL

## 2024-08-07 PROCEDURE — 250N000013 HC RX MED GY IP 250 OP 250 PS 637: Performed by: PHYSICIAN ASSISTANT

## 2024-08-07 PROCEDURE — 81025 URINE PREGNANCY TEST: CPT | Performed by: PSYCHIATRY & NEUROLOGY

## 2024-08-07 PROCEDURE — 250N000013 HC RX MED GY IP 250 OP 250 PS 637: Performed by: PSYCHIATRY & NEUROLOGY

## 2024-08-07 PROCEDURE — 97150 GROUP THERAPEUTIC PROCEDURES: CPT | Mod: GO

## 2024-08-07 PROCEDURE — 93010 ELECTROCARDIOGRAM REPORT: CPT | Performed by: INTERNAL MEDICINE

## 2024-08-07 PROCEDURE — 99222 1ST HOSP IP/OBS MODERATE 55: CPT | Performed by: PSYCHIATRY & NEUROLOGY

## 2024-08-07 PROCEDURE — 93005 ELECTROCARDIOGRAM TRACING: CPT

## 2024-08-07 PROCEDURE — 99254 IP/OBS CNSLTJ NEW/EST MOD 60: CPT | Performed by: PHYSICIAN ASSISTANT

## 2024-08-07 PROCEDURE — 124N000002 HC R&B MH UMMC

## 2024-08-07 PROCEDURE — 80307 DRUG TEST PRSMV CHEM ANLYZR: CPT | Performed by: PSYCHIATRY & NEUROLOGY

## 2024-08-07 PROCEDURE — 250N000013 HC RX MED GY IP 250 OP 250 PS 637: Performed by: NURSE PRACTITIONER

## 2024-08-07 RX ORDER — LEVOTHYROXINE SODIUM 112 UG/1
112 TABLET ORAL
Status: DISCONTINUED | OUTPATIENT
Start: 2024-08-08 | End: 2024-08-14 | Stop reason: HOSPADM

## 2024-08-07 RX ORDER — HYDROXYZINE HYDROCHLORIDE 25 MG/1
25 TABLET, FILM COATED ORAL EVERY 4 HOURS PRN
Status: DISCONTINUED | OUTPATIENT
Start: 2024-08-07 | End: 2024-08-14 | Stop reason: HOSPADM

## 2024-08-07 RX ORDER — AMOXICILLIN 250 MG
1 CAPSULE ORAL AT BEDTIME
Status: DISCONTINUED | OUTPATIENT
Start: 2024-08-07 | End: 2024-08-09

## 2024-08-07 RX ORDER — VITAMIN B COMPLEX
25 TABLET ORAL DAILY
Status: DISCONTINUED | OUTPATIENT
Start: 2024-08-07 | End: 2024-08-14 | Stop reason: HOSPADM

## 2024-08-07 RX ADMIN — HYDROXYZINE HYDROCHLORIDE 25 MG: 25 TABLET, FILM COATED ORAL at 04:59

## 2024-08-07 RX ADMIN — ACETAMINOPHEN 650 MG: 325 TABLET, FILM COATED ORAL at 16:03

## 2024-08-07 RX ADMIN — OLANZAPINE 5 MG: 5 TABLET, FILM COATED ORAL at 03:47

## 2024-08-07 RX ADMIN — HYDROXYZINE HYDROCHLORIDE 25 MG: 25 TABLET, FILM COATED ORAL at 18:12

## 2024-08-07 RX ADMIN — ACETAMINOPHEN 650 MG: 325 TABLET, FILM COATED ORAL at 00:53

## 2024-08-07 RX ADMIN — LITHIUM CARBONATE 450 MG: 450 TABLET, EXTENDED RELEASE ORAL at 20:25

## 2024-08-07 RX ADMIN — LEVOTHYROXINE SODIUM 100 MCG: 50 TABLET ORAL at 08:19

## 2024-08-07 RX ADMIN — SENNOSIDES AND DOCUSATE SODIUM 1 TABLET: 50; 8.6 TABLET ORAL at 20:29

## 2024-08-07 ASSESSMENT — ACTIVITIES OF DAILY LIVING (ADL)
ADLS_ACUITY_SCORE: 45
ORAL_HYGIENE: INDEPENDENT
ADLS_ACUITY_SCORE: 55
ADLS_ACUITY_SCORE: 55
ADLS_ACUITY_SCORE: 45
ADLS_ACUITY_SCORE: 55
ADLS_ACUITY_SCORE: 45
ADLS_ACUITY_SCORE: 55
ADLS_ACUITY_SCORE: 45
ADLS_ACUITY_SCORE: 55
ADLS_ACUITY_SCORE: 55
ADLS_ACUITY_SCORE: 45
HYGIENE/GROOMING: INDEPENDENT
LAUNDRY: WITH SUPERVISION
ADLS_ACUITY_SCORE: 55
ADLS_ACUITY_SCORE: 45
ADLS_ACUITY_SCORE: 55
ADLS_ACUITY_SCORE: 45
ADLS_ACUITY_SCORE: 45
ADLS_ACUITY_SCORE: 55
ADLS_ACUITY_SCORE: 45
DRESS: INDEPENDENT
ADLS_ACUITY_SCORE: 55

## 2024-08-07 NOTE — PROGRESS NOTES
Pt in bed sleeping at the beginning of the shift till midnight when pt came out of room and was observed pacing in the hallway and in and out of her room. Pt has slow movements and takes time to respond to questions when asked. Pt c/o headache and requested to have an ice pack which was given along with PRN Tylenol and was effective. Pt went back to bed and slept till 0330 when she asked if she can call her . Pt was reminded that phones are turned on at 0730 and she can call her  then, pt became tearful and crying so loud in the unit. Staff were able to distract pt and redirect her back to her room, PRN Zyprexa given and was effective. Pt went back to bed but again got up at 0500 crying again. PRN Hydroxyzine given and snack offered and pt went back to sleep. Pt slept for 4 hours. Pt appears withdrawn to self. Pt remains on SIO 1:1. Will continue to monitor.

## 2024-08-07 NOTE — PROGRESS NOTES
"  Rehab Group    Start time: 11:15  End time: 12:15  Patient time total: 50 minutes    attended full group     #3 attended   Group Type: general health and coping   Group Topic Covered: coping skills, mindfulness, and Physical activity     Group Session Detail:  OT provided education on mind/body therapeutic benefits of physical activity, including: better sleep, decreasing symptoms of depression and anxiety from natural mood booster chemicals such as endorphins, decreasing stress and cortisol levels, increasing self-esteem and self-confidence, improving creative and mental energy, and working to prevent cognitive decline and memory loss. Using chair yoga exercise cards, OT had each patient read through and direct peers in completing yoga movements and breathing exercises to maximize the social and communication aspects of the group.     Patient Response/Contribution:  cooperative with task, Limited eye contact, appeared confused , and guarded     Patient Detail:    OT joined group with some prompting. She continued to present with an anxious, softspoken, labile affect/mood. She reported that she has occasionally done yoga and she enjoys running outside or on a treadmill. Pt followed along with most of the movement patterns, but occasionally sat and watched. Appeared overwhelmed at times. Appeared confused at times, when it was her turn to read a yoga card she didn't appear to know what to do with the sweatshirt she was holding until a peer offered to hold it for her for a few minutes. At the end of group, pt appeared nervous to relocate from the group room or blocked in some way. A loud noise (a chair dropping) came from the dining area and patient physically reacted with a flinch and began to tear up. Pt softly stated she felt triggered, \"my dad.\" When offered to stay in the group room, pt nodded. When offered to close the door, pt nodded. When offered aromatherapy, pt could not respond and was tearful. Pt " apologized. OT offered pt space and to stay in the OT room with her SIO staff as long as she needs.         [id number] OT Group (2 or more in attendance)      Patient Active Problem List   Diagnosis    Mental health disorder    Sleep deprivation    Bipolar 1 disorder (H)    Bipolar disorder, current episode depressed, severe, with psychotic features (H)    Suicide attempt by substance overdose (H)    Insomnia, unspecified type

## 2024-08-07 NOTE — DISCHARGE INSTRUCTIONS
Behavioral Discharge Planning and Instructions    Summary: You were admitted on 8/6/2024  due to Manic Symptomology.  You were treated by SHANON Aggarwal CNP and SHANON Farias CNP and discharged on 08/14/2024 from Station [facility] to Home    Main Diagnosis: Bipolar 1 disorder    Health Care Follow-up:     Individual Therapy appointment: August 21, 2024 @ 9am via Telehealth  Provider: Slime Wan MS, [facility]  Address: [facility], [address] [place], [place] [id number]   Phone: [phone]  Fax: [phone]     Psychiatry appointment: Monday, September 18, 2024 @ 10:40am In-person   Provider: Gustavo Keene MD  Location: [address] [address]  Phone: [phone]  Fax: [phone]   Notes: This is the soonest available appointment with your provider, but it's past the 30-day window for medication renewal. Please call the clinic within 2 weeks of discharge to renew medications. Please also verify the address for your appointment.   HUC TO FAX AVS to above appointment, please     Information will be faxed to your outpatient providers to ensure a healthy continuity of care for you.     Attend all scheduled appointments with your outpatient providers. Call at least 24 hours in advance if you need to reschedule an appointment to ensure continued access to your outpatient providers.     Major Treatments, Procedures and Findings:  You were provided with: a psychiatric assessment, assessed for medical stability, medication evaluation and/or management, group therapy, milieu management, and medical interventions    Symptoms to Report: feeling more aggressive, increased confusion, losing more sleep, or mood getting worse    Early warning signs can include: increased depression or anxiety sleep disturbances increased unusual thinking, such as paranoia or hearing voices    Safety and Wellness:  Take all medicines as directed.  Make no changes unless your doctor  suggests them.      Follow treatment recommendations.  Refrain from alcohol and non-prescribed drugs.  If there is a concern for safety, call 911.    Resources:   Mental Health Crisis Resources  Throughout [place]: call **CRISIS (**577107)  Crisis Text Line: is available for free, 24/7 by texting [place] to 600022  Suicide Awareness Voices of Education (SAVE) (www.save.org): 926-685-ICTF (8276)  The National Suicide Prevention Lifeline is now: 988 Suicide and Crisis Lifeline. Call 988 anytime.  National [place] on Mental Illness (www.[place].megan.org): 702.471.5643 or 972-398-0377.  Qrco9hakf: text the word LIFE to 24705 for immediate support and crisis intervention  Mental Health Consumer/Survivor Network of [place] (www.mhcsn.net): 428.980.1633 or 224-146-8982  Mental Health Association of [place] (www.mentalhealth.org): 263.505.4899 or 499-360-3020  Peer Support Connection [place] Warmline ([facility]) 1-427.584.1514 Available from 5pm - 9am (7 days a week/365 days a year)  Call or Text 693    General Medication Instructions:   See your medication sheet(s) for instructions.   Take all medicines as directed.  Make no changes unless your doctor suggests them.   Go to all your doctor visits.  Be sure to have all your required lab tests. This way, your medicines can be refilled on time.  Do not use any drugs not prescribed by your doctor.  Avoid alcohol.    Advance Directives:   Scanned document on file with [employer]? No scanned doc  Is document scanned? Pt states no documents  Honoring Choices Your Rights Handout: Minor - N/A  Was more information offered? Pt declined    The Treatment team has appreciated the opportunity to work with you. If you have any questions or concerns about your recent admission, you can contact the unit which can receive your call 24 hours a day, 7 days a week. They will be able to get in touch with a Provider if needed. The unit number is [phone].
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
## 2024-08-07 NOTE — PLAN OF CARE
INITIAL PSYCHOSOCIAL ASSESSMENT AND NOTE    Information for assessment was obtained from:      [x]Patient     []Parent     []Community provider    [x]Hospital records   [x]Other - , [place]    []Guardian    Upon interview, Fiorella reported having a bad headache and requested to rest and utilize ice pack. Majority of assessment information was obtained from chart review.      Presenting Problem:  Fiorella Bartlett is a 38 year old female who uses she/her pronouns and presented to [facility] ED on 8/3/2024 by  following racing thoughts, insomnia, and concern for manuela and confusion. Fiorella has history of bipolar 1 disorder and was admitted on 8/6/2024 to Station [facility] on a 72 hour hold placed on 8/5/2024 at 1416 . Petition for MI Commitment and Houser was initiated in [facility].     History of Mental Health and Substance Use/Chemical Dependency:  Mental Health History:  Fiorella has a historical diagnosis of Bipolar 1 Disorder; Major Depressive Disorder; Generalized Anxiety Disorder.   Fiorella has a history of suicide attempts: overdose 3x - 2014, 2015, 2021.   Fiorella doesn't have a history of engaging in non-suicidal self-injury.     Previous psychiatric hospitalizations and treatments (including outpatient, residential, and inpatient care):  [facility] - 8/20/2021-8/30/2021 - presented due to suicide attempt in context of stress related to work and insomnia. Discharged home.  [facility] - 3/24/2019-4/9/2019 - presented due to symptoms of psychosis and manuela. Discharged home.  [facility] - 2/3/2019-2/5/2019 - presented due to psychosis (disorganization and paranoia) in context of increased work stress. Discharged home.  [facility] - 2/15/2017-2/19/2017 - presented due to increased symptoms of depression and disorganized behavior in context of elevated stress and lack of sleep. Discharged home.    Substance Use History:  Summary of use: None  UDS not completed while in ED.      Chemical dependency treatment/detox: None    Family Description (Constellation, significant information and events, Family Psychiatric History):  Constellation/Background:   Upbringing: Raised in [place], [place] by both parents. Has a sister, Selina. Moved to [place] in  for work.  to [place] in .     Current relationship/marital status:  - Daniel Nicholson (phone: [phone])    Number of children/grandchildren: None    Family Psychiatric/Substance Use History:   Sister has anxiety.    Significant Medical Issues, Life Events, and/or Trauma History:   Per DEC assessment: Fiorella is undergoing breast cancer treatment; recently returned to work in July. Has reconstructive surgery scheduled for 2024.    reports Fiorella has been on lower doses of Seroquel and Lithium as directed by her outpatient psychiatrist due to recently completing chemotherapy treatments for the last 6 months.     Denies history of trauma.    Living Situation:  Fiorella lives with her  in [place].  reports their home is a safe and stable place for Fiorella, and the hope would be for her to return when stabilized.    Educational Background:    Fiorella's highest education level was college graduate.   She is able to understand written materials.     Occupational and Financial Status:   Fiorella is currently employed full-time at [employer] - recently returned to work after leave of absence for cancer treatment.   She reports income is obtained through employment.    Finances aren't identified as a current stressor.     Health insurance: [employer]  Restricted recipient: No    Occupational history: History of working at [employer].    Legal Concerns:     Legal status during current admission: 72 hour hold (initiated on 2024 at 1416, will  on 2024 at 1416)   Petition for MI Commitment and Houser was initiated in [facility] by [facility] ED staff on 8/6/2024. Screener plans to meet with Fiorella today 8/7/2024 and provide decision of support.   (Proposed Houser meds: Zyprexa, Haldol, Abilify, and Seroquel)     Current concerns (including legal guardian): None    Historical concerns (including commitment history): None - chart indicates underage drinking in high school.     Service History:   No    Ethnic/Cultural/Spiritual considerations:   Fiorella describes her cultural background as White/, heterosexual, cisgender and female.      Contextual influences on patient's health include severity of symptoms and level of functioning.   Fiorella's primary/preferred language is English and she does not need the assistance of an .      Spiritual considerations include: None    Social Functioning (organizations, interests, support system):   In her free time, Fiorella reports she likes to read and garden.      Fiorella identified partner as part of her support system. She identified the quality of these relationships as stable and meaningful.       Current Treatment Providers are:  Primary Care Provider:  Name: Marilyn Caruso PA-C   Organization/Clinic Address: [facility]   Phone Number: [phone]  Fax Number: [phone]    Medication Management/Psychiatry: - unable to assess  Name:   Organization/Clinic Address:    Phone Number:   Fax Number:     Therapist: - unable to assess  Name:   Organization/Clinic Address:    Phone Number:   Fax Number:           GOALS FOR HOSPITALIZATION:  Patient priorities:  Unable to assess     Social Service Assessment/Plan:  Patient view:   Unable to assess    Strengths and Assets:  Unable to assess    Patient will have psychiatric assessment and medication management by the psychiatrist. Medications will be reviewed and adjusted per DO/MD/APRN CNP as indicated. The treatment team will continue to assess and stabilize the patient's mental health symptoms with the  use of medications and therapeutic programming. Hospital staff will provide a safe environment and a therapeutic milieu. Staff will continue to assess patient as needed. Patient will participate in unit groups and activities. Patient will receive individual and group support on the unit.      CTC will do individual inpatient treatment planning and after care planning. CTC will discuss options for increasing community supports with the patient. CTC will coordinate with outpatient providers and will place referrals to ensure appropriate follow up care is in place.

## 2024-08-07 NOTE — PLAN OF CARE
08/06/24 1922   Patient Belongings   Patient Belongings locker   Patient Belongings Put in Hospital Secure Location (Security or Locker, etc.) clothing   Comment   (Bra, shirt, pants underwear, compresion)       Pt belongings include  1 pair of pants 2 t-shirt, 1 bra, 1 pair shorts, 1 sweater, three pairs of underwear, 1 pair of shoes, 1 pair of socks, a coloring book, and two artful living magazines.     A               Admission:  I am responsible for any personal items that are not sent to the safe or pharmacy.  [facility] is not responsible for loss, theft or damage of any property in my possession.    Signature:  _________________________________ Date: _______  Time: _____                                              Staff Signature:  ____________________________ Date: ________  Time: _____      2nd Staff person, if patient is unable/unwilling to sign:    Signature: ________________________________ Date: ________  Time: _____     Discharge:  [facility] has returned all of my personal belongings:    Signature: _________________________________ Date: ________  Time: _____                                          Staff Signature:  ____________________________ Date: ________  Time: _____

## 2024-08-07 NOTE — PROGRESS NOTES
"SIO 1:1 Observation Note    Upon assuming care of patient, writer observed patient lying in bed and staring wide-eyed at writer. When writer asked, \"are you okay,\" patient responded by shaking her head, turning over in bed, and crying. Writer assured her she was in a safe place and that writer was present if she had any questions or needed anything. Patient approached writer after a short time and was very tearful and stated, \"I'm so sorry. I was agitated.\" Writer provided emotional support. Patient proceeded to throw herself to her bed and scream/wail loudly. She then yelled at writer, \"this place is horrible. Where is my ? Get out of my room! You'll see the footage!\" She insisted that writer leave her room, and adequate but safe space was provided. Patient stated that she wants her  to pick her up. She was easily frustrated. She displayed acute confusion and disorganization. When writer offered to open the shades on her window, patient stood up quickly and yelled, \"do you want me to comply?\" She frequently glared at writer and patients and required redirection when this was directed at patients. She displayed paranoid thinking and thought others were talking about her. When she heard staff talking at the nursing station she yelled from her room, \"yes, I do talk slow! So what?\" She needed reassurance, redirection, and reorientation frequently from 1:1 staff throughout the day.   "

## 2024-08-07 NOTE — PROGRESS NOTES
"  Rehab Group    Start time: 10:15  End time: 11:15  Patient time total: 10 minutes    attended partial group    #5 attended   Group Type: OT Clinic   Group Topic Covered: balanced lifestyle, coping skills, healthy leisure time, and social skills     Group Session Detail:  Pt actively participated in occupational therapy clinic to facilitate coping skill exploration, creative expression within personally meaningful activities, and clinical observation of social, cognitive, and kinesthetic performance skills.      Patient Response/Contribution:  cooperative with task, distracted , and appeared confused      Patient Detail:    Patient presented with an anxious, labile, and guarded mood. Softspoken. Visually attended to OT introduction to group programming and invitation, but hardly responded to the invitation. Pt did follow peers into the group room and with a prompt was able to look in the cabinets and choose a simple coloring project. Pt stepped out of group for a few minutes, then when she returned she stated quietly \"I'm going to give you some space\" and attempted to take her materials to a table alone in the dining area. Pt returned materials to OT when prompted but did not want to return to group. OT directed her to materials available in the [facility].         [id number] OT Group (2 or more in attendance)      Patient Active Problem List   Diagnosis    Mental health disorder    Sleep deprivation    Bipolar 1 disorder (H)    Bipolar disorder, current episode depressed, severe, with psychotic features (H)    Suicide attempt by substance overdose (H)    Insomnia, unspecified type       "

## 2024-08-07 NOTE — CONSULTS
[facility]  Consult Note - Hospitalist Service  Date of Admission:  8/6/2024  Consult Requested by: Dr. Ronna Murdock   Reason for Consult: hypothyroidism     Assessment & Plan   Fiorella Bartlett is a 38 year old female admitted on 8/6/2024. She has a history of triple negative breast cancer, depression, bipolar, history of kidney stones, hypothyroidism who is admitted to mental health for evaluation of racing thoughts, insomnia, concerns for manuela and confusion.  Internal medicine was consulted for hypothyroidism in the setting of elevated TSH with low free T4.    Hypothyroidism  PTA on Synthroid 100 mcg daily.  She follows outpatient with endocrinology at [facility], Dr. Hardy.  Seen 5/6/2024, 6/28/24, notes reviewed.  Patient reports she has been mostly compliant with Synthroid, however, has missed 2-3 doses in the last several weeks. TSH 8/4/24 was 29, free T4 0.68.   -Given significantly increased TSH with low Free T4 despite only missing 2-3 doses of synthroid will increase Synthroid to 112mcg daily   -Repeat TFT with PCP vs endocrinology at [facility] in 4 weeks   -Notify medicine if patient remains inpatient in 4 weeks     Depression   Bipolar   -Management per psychiatry     History of breast cancer (triple negative)   Diagnosed 10/23, follows with [facility]. Underwent neoadjuvant chemotherapy with carboplatin paclitaxel Keytruda 11/22/2023-2/14/2024.  Initiated on neoadjuvant adjuvant adriamycin cyclophosphamide Keytruda 2/22/2024.    -Ongoing follow-up with oncology as planned     Constipation   Patient reports mild constipation.  Notes only small bowel movement in the last 2 days.  Denies any abdominal pain, nausea, vomiting.  -Add Senokot-S 1 tab at bedtime; okay to increase to twice daily if ongoing constipation  -No bowel movement in 2 days with Senokot-S consider adding MiraLAX 17 g daily  -Milk of mag daily as needed  -Notify  medicine if patient does not have bowel movement within 3 days or develops abdominal pain, nausea, vomiting    Vitamin D deficiency  -Continue PTA vitamin D supplement       The patient's care was discussed with the Patient.     Currently patient is medically stable and medicine will sign off. Thank you for allowing us to be a part of this patients care. Please notify on call LEYLA if any intercurrent medical issues arise.       Clinically Significant Risk Factors Present on Admission                      # Acute Hypoxic Respiratory Failure: Documented O2 saturation < 90%. Continue supplemental oxygen as needed            # Financial/Environmental Concerns:                 NAVDEEP Pickens  Hospitalist Service  Securely message with [employer] (more info)  Text page via [facility] Paging/Directory   ______________________________________________________________________    Chief Complaint   Head spinning     History is obtained from the patient and review of medical records    History of Present Illness   Fiorella Bartlett is a 38 year old female who has a history of triple negative breast cancer, depression, bipolar, history of kidney stones, hypothyroidism who is admitted to mental health for evaluation of racing thoughts, insomnia, concerns for manuela and confusion.  Internal medicine was consulted for hypothyroidism in the setting of elevated TSH with low free T4.  Patient reports she was started on Synthroid for hypothyroidism [facility]  She notes she has been mostly compliant with Synthroid, however, has missed 2-3 doses in the last several weeks.  She notes symptoms of racing thoughts, insomnia, constipation, fatigue.  Denies any palpitations, chills, difficulty urinating, numbness or tingling.  He does note she feels her head is spinning from all the racing thoughts she is having.  [facility] and wishes to continue to follow with  "them upon discharge.    Past Medical History    Past Medical History:   Diagnosis Date    Breast cancer (H)     Depressive disorder        Past Surgical History   Past Surgical History:   Procedure Laterality Date    IR CHEST PORT PLACEMENT > 5 YRS OF AGE  11/7/2023       Medications   Medications Prior to Admission   Medication Sig Dispense Refill Last Dose    Cholecalciferol (VITAMIN D3 PO) Take 1 tablet by mouth daily        levothyroxine (SYNTHROID/LEVOTHROID) 100 MCG tablet Take 100 mcg by mouth daily       lithium (ESKALITH CR/LITHOBID) 450 MG CR tablet Take 450 mg by mouth at bedtime       QUEtiapine (SEROQUEL) 100 MG tablet Take 200 mg by mouth at bedtime             Allergies   Allergies   Allergen Reactions    Pcn [Penicillins] Unknown     Has been told she was allergic since she was a child.         Physical Exam   Blood pressure 134/83, pulse 100, temperature 98.5  F (36.9  C), temperature source Oral, resp. rate 18, height 1.626 m (5' 4\"), weight 63.5 kg (140 lb), SpO2 100%.  GENERAL: Alert and oriented x 3.  Tearful intermittently during exam.  HEENT: Anicteric sclera. Mucous membranes moist and without lesions.   CV: RRR. S1, S2. No murmurs appreciated.   RESPIRATORY: Effort normal on room air. Lungs CTAB with no wheezing, rales, rhonchi.   GI: Abdomen soft and non distended, bowel sounds present. No tenderness, rebound, guarding.   MUSCULOSKELETAL: No joint swelling or tenderness. Moves all extremities.   NEUROLOGICAL: No focal deficits.   EXTREMITIES: No peripheral edema. Intact bilateral pedal pulses.   SKIN: No jaundice. No rashes.       Medical Decision Making       45 MINUTES SPENT BY ME on the date of service doing chart review, history, exam, documentation & further activities per the note.      Data   ROUTINE IP LABS (Last four results)  Recent Labs   Lab 08/04/24  1944      POTASSIUM 4.0   CHLORIDE 104   CO2 23   ANIONGAP 11   *   BUN 11.7   CR 0.79   TARAH 9.5   PROTTOTAL 6.6 "   ALBUMIN 4.5   BILITOTAL 0.2   ALKPHOS 62   AST 21   ALT 18        Recent Labs   Lab 08/05/24  1451   WBC 6.0   RBC 4.29   HGB 13.3   HCT 38.8   MCV 90   MCH 31.0   MCHC 34.3   RDW 11.9        No lab results found in last 7 days.         Latest Ref Rng & Units 8/4/2024     7:44 PM   Glucose Values   Glucose 70 - 99 mg/dL 100         TSH   Date Value Ref Range Status   08/04/2024 29.64 (H) 0.30 - 4.20 uIU/mL Final   08/03/2024 24.61 (H) 0.30 - 4.20 uIU/mL Final   02/04/2019 2.31 0.40 - 4.00 mU/L Final     Free T4   Date Value Ref Range Status   08/04/2024 0.68 (L) 0.90 - 1.70 ng/dL Final

## 2024-08-07 NOTE — PLAN OF CARE
BEH IP Unit Acuity Rating Score (UARS)  Patient is given one point for every criteria they meet.    CRITERIA SCORING   On a 72 hour hold, court hold, committed, stay of commitment, or revocation. 1    Patient LOS on BEH unit exceeds 20 days. 0  LOS: 1   Patient under guardianship, 55+, otherwise medically complex, or under age 11. 1   Suicide ideation without relief of precipitating factors. 0   Current plan for suicide. 0   Current plan for homicide. 0   Imminent risk or actual attempt to seriously harm another without relief of factors precipitating the attempt. 0   Severe dysfunction in daily living (ex: complete neglect for self care, extreme disruption in vegetative function, extreme deterioration in social interactions). 1   Recent (last 7 days) or current physical aggression in the ED or on unit. 0   Restraints or seclusion episode in past 72 hours. 0   Recent (last 7 days) or current verbal aggression, agitation, yelling, etc., while in the ED or unit. 1   Active psychosis. 0   Need for constant or near constant redirection (from leaving, from others, etc).  1   Intrusive or disruptive behaviors. 0   Patient requires 3 or more hours of individualized nursing care per 8-hour shift (i.e. for ADLs, meds, therapeutic interventions). 1   TOTAL 6

## 2024-08-07 NOTE — PLAN OF CARE
Writer took call from pt's , Daniel. LAWANDA is signed on file. Writer gave him update on how pt is doing today. Daniel seems supportive of pt and states home environment is supportive. He offered that pt's poor sleep lately seems to be contributing heavily to pt's current symptoms (paranoia, confusion, inappropriate affect). Writer gave short explanation about civil commitment process and then transferred him to [facility] for more detailed information.

## 2024-08-07 NOTE — PLAN OF CARE
"Preferences: she/her pronouns, goes by \"Fiorella\"     needs: No    Team Meeting: Varies based on provider availability   Attending Provider: Ronna Murdock MD  Voicemail Code: See desk phone  Team Note Due: Wednesday  Next Steps:   Follow up on status of petition for commitment.  Ensure aftercare supports are in place.    Assessment/Intervention/Current Symtoms and Care Coordination:  -Refer to psychosocial completed on 8/7/2024 for assessment/social functioning  -Chart review  -Writer called [facility] Pre-Petition Screening (phone: [phone]) and verified that petition for commitment was started by staff in [facility] ED yesterday. Screener Ezequiel is aware of Fiorella's transfer/admission to [facility].   -Writer received call from Ezequiel stating he plans to come meet with Fiorella this morning and has already made contact with her  but plans to call him again this afternoon. He notes he is aware of the 72HH end time and expects to be able to provide a decision of support within the allotted time.   -RN took call from Fiorella's  who requested update on civil commitment process. Writer spoke with him and provided background information, possible outcomes, etc., and answered his questions about the process, including LOS. He notes he agrees Fiorella is not currently well enough to return home, but is hopeful she will clear and be ready to return home next week. Daniel shared he is anxious about the process and that Fiorella has a good environment to return to when ready, noting she's had hospitalizations in the past but has cleared fairly quickly. He states typically what needs to happen and what he's hoping will happen again this time is Fiorella's sleep will get sorted out along with meds being adjusted. Fiorella had been on a lower dose of Lithium and Seroquel as directed by her outpatient psychiatrist while going through chemotherapy treatments for the past 6 months. He was provided with writer's " direct number for future questions and notes he's planning to reconnect with PPS Ezequiel.   -Team meeting - provider plans to consult with pharmacist regarding best avenue for increasing medications given hyperthyroidism. Team awaits decision of support from PPS team.   -Writer received update from Ezequiel that after much discussion, PPS is not supporting petition due to lack of evidence that she's a danger to herself/others and unable to care for self. Fiorella reportedly only stayed in the room with screener for about a minute, presented with a blank stare/affect, provided only simple responses, and voiced interest in talking to a . Fiorella's  also was ambivalent that Fiorella will likely clear in ~3 days and is concerned about civil commitment case being on her record. Her  expressed lack of concern for SIB, and states primary concern is her confusion.   -Writer shared update on rationale of PPS's decision of non-support. We will appeal decision due to concern for safety - Fiorella is not able to contract for safety, endorses SI, and is generally guarded related to symptoms. Writer called Ezequiel to provide update, left voicemail at 3:26pm.   -Writer met with Fiorella to provide check-in. She was seated on her bed and was soft-spoken, appearing slightly tearful. She notes having a headache and says it's really loud here with the overhead announcements, staff, and other patients. Fiorella requested ice pack and writer confirmed this was okay with her RN. Ice pack provided and Fiorella requested to have quiet time to rest - writer will return tomorrow. No other questions/concerns expressed at this time.  Current Symptoms include the following: confusion, anxious, and paranoia  Precautions: Assault and Cheeking    Discharge Plan or Goal:  Pending stabilization & development of a safe discharge plan.  Considerations include: Return home with outpatient providers    Discharge Checklist:  Transportation: TBD  Medications  ordered/sent to: No  Restricted recipient: No  Provisional discharge required: TBD  Trina safety plan completed: No  Appointments scheduled:   Psychiatry - TBD  Therapy - TBD  PCP - TBD  AVS complete: No    Barriers to Discharge:  Fiorella presents following increase in racing thoughts, insomnia, and concern for manuela and confusion. While in the ED, a petition for civil commitment and [place] was filed - team is awaiting decision of support from [facility] Pre-Petition Screening team. She requires symptom stabilization, medication management, and supportive discharge plan.     Referral Status:  Referrals TBD    Legal Status:  Fiorella is under a 72 hour hold (initiated on 2024 at 1416, will  on 2024 at 1416)   (Proposed [place]er medications: Zyprexa, Haldol, Abilify, and Seroquel)    Contacts:  Family/Friends:  Spouse - Daniel Nicholson (phone: [phone]) - LAWANDA obtained 2024  Sister - Selina Bartlett (phone: [phone])    Outpatient Providers:  Primary Care Provider - Marilyn Caruso PA-C of [facility] [place] [facility] (phone: [phone])  [facility] Pre-Petition Screener - Ezequiel West (phone: [phone], email: [email or web address].)?    Upcoming Meetings/Important Dates:  Court (commitment/guardianship,etc.):  TBD    Interview/assessment/care conference:  N/A    Aftercare/outpatient appointments:  N/A    Rationale for SIO/No Roommate Order:  Fiorella is on 1:1 SIO due to assault risk.  Fiorella is in single room.   (Single room  was started on Station 10 on 2024).      Upcoming Encounters  Date Type Department Care Team (Latest Contact Info) Description   2024 11:45 AM CDT Appointment Cosmetic and Plastic Surgeons   [address], [address]   [place], [place] [id number] [phone]  Philip Love MD   [address] [place] [address]   [phone] (Work)   [phone] (Fax)      2024 1:20 PM CDT Telemedicine [facility]  Endocrinology   [address] [place] [address]   [phone]  Jose Hardy MD   [address] [place] [address]   [phone] (Work)   [phone] (Fax)      08/20/2024 9:20 AM CDT Hospital Encounter LV Operating Room   [address] [place] [address]   [phone]  Philip Love MD   [address] [place] [address]   [phone] (Work)   [phone] (Fax)      08/20/2024 9:20 AM CDT - 08/20/2024 11:50 AM CDT Surgery LV Operating Room   [address] [place] [address]   [phone]  Philip Love MD   [address] [place] [address]   [phone] (Work)   [phone] (Fax)  PLACEMENT OF IMPLANTS WITH ALLODERM SLING   08/23/2024 10:30 AM CDT Appointment Otolaryngology at [facility] [address] [address]   [phone]  Marge Pearson MD   [address] [place] [address]   [phone] (Work)   [phone] (Fax)      08/27/2024 9:00 AM CDT Appointment Cosmetic and Plastic Surgeons   [address]   [place] [address]   [phone]  Carolyn Simpson, APRN, CNP   [address] [place] [address]   [phone] (Work)   [phone] (Fax)      09/17/2024 9:00 AM CDT Appointment Cos[facility]etic and Pl[facility] Surgeons   [address]   [place] [address]   [phone]  Carolyn Simpson, APRN, CNP   [address] [place] [address]   [phone] (Work)   [phone] (Fax)      11/12/2024 9:15 AM CST Appointment Cos[facility]etic [facility] Pl[facility] Surgeons   [address]   [place] [address]   [phone]  Philip Love MD   [address] [place] [address]   [phone] (Work)   [phone] (Fax)      11/20/2024 10:00 AM CST Appointment [facility]   [address] [place] [address]   [phone]   Doris Masters MBBS   [address], [place] [address] [phone] (Work)   [phone] (Fax)      12/23/2024 9:30 AM [place] [address] Dermatology   [address] [place], [place] [address] [phone]  Shirley Harman MD   [address] [place] [id number] [phone] (Work)   [phone] (Fax)

## 2024-08-07 NOTE — PLAN OF CARE
"RN Assessment    SI/SIB/HI/AVH:None reported or observed, pt was marita with formal check-in  Thought process:Pt thought process is disorganized, paranoid, and slightly delusional.  Sleep:Pt slept for 4 hours recorded on NOC  Affect & Mood:Pt affect is labile, irritable, tearful, and guarded. Pt endorsed anxiety at the start of the shift.     Behavior: At the start of shift, SIO staff reported pt hysterically crying and making nonsensical statements. Upon introducing ourselves to pt, she was withdrawn, soft spoken, and tearful endorsing anxiety and stating that the unit was \"like a monopoly game\" with what is allowed. Pt was receptive to interventions including a weighted blanket and ice pack for anxiety, but declined PRN medication stating \"I don't want more meds\". SIO staff reported some bizarre behavior with pt staring intensely at another pt and when the other pt said \"what?\" she relied, \"I'm not sure yet.\" Pt was observed pacing up and down the halls this morning and seemed tense. [facility], minimally social with select peers, and attended OT group. Pt was approached this afternoon around 1235 for a check-in. Writer/preceptor sat down at a table in the dining room with pt. Pt immediately got tearful, stood up, and walked away without saying anything. Writer followed pt into hallway to which she stated, \"my whole family is dead\". Preceptor informed pt that she had just spoken to her  on the phone and asked if she wanted to call him. Pt stated that she wanted to call him. Preceptor asked if pt knew 's phone number or if she needed it written down. Pt got irritable and stated, \"stop questioning me!\" Pt then proceeded to sit down at the phones and call her . Writer/preceptor retreated to give pt space. Pt was observed later laying down on the floor of her room at the end of her bed resting. Pt had 2 EKG readings done today due to some abnormalities found, provider has " contacted cardiology regarding this. Per provider, pt shared that she may be expecting so provider ordered a pregnancy test.     Pt is compliant with medications    Possible Medication SE:None reported or observed   Hygiene: adequate   VS:OSCAR   Pain: denies  Medical Concerns: Pt has hx of bilateral mastectomy and it is advised that her BP is taken on her right arm   Appetite & Fluid intake: adequate   Bowel & Bladder: no issues reported

## 2024-08-07 NOTE — H&P
Psychiatry History and Physical    Fiorella Bartlett MRN# [id number]   Age: 38 year old YOB: 1985     Date of Admission:  8/6/2024          Assessment:   This patient is a 38 year old female with history of history of depression (including psychotic depression dating back to 2015), bipolar I disorder, sleep deprivation, eating disorder, and brief reactive psychosis, and breast cancer  who presented to ED with symptoms of manuela in context of hypothyroidism, subtherapeutic dose of Lithium, dose reductions in psychotropic medications, chemotherapy, and recent return to work. Pt reports that Seroquel and Lithium Symptoms and presentation at this time is most consistent with Bipolar Disorder, Type I, currently mixed manuela. I have discussed the risks, benefits, and alternatives of PTA medication regimen. I spoke with PharmD regarding Lithium use in setting of hypothyroidism. Pt reported that she had missed a couple of doses of Synthroid recently, though per PharmD recs, will consult with IM to ensure she is on an adequate dose of Synthroid while also optimizing Lithium to treat current symptoms. However, due to prolonged QTc discovered today, will hold off on an increase in Lithium until repeat EKG in the AM. I discussed results of EKG with cardiology team who recommends repeat EKG in AM (QTc was prolonged on first EKG today then normalized on second EKG, but per cardiology, normalization of QTc seen on EKG may not be accurate given evidence of baseline artifact). Cardiology is also recommending holding Seroquel until repeat EKG in the AM. Petition for MI commitment and Houser were filed in ED. [facility] did NOT support petition for commitment, however, due to patient's ongoing SI, guardedness, severe paranoia and depressive symptoms, unwillingness to formulate a safety plan at this time, and prolonged QTC/need to temporarily hold Seroquel, writer is appealing this decision and we are currently awaiting  outcome of appeal. Patient will likely benefit from increased MI supports upon discharge. Inpatient psychiatric hospitalization is warranted at this time for safety, stabilization, and possible adjustment in medications.         Diagnoses:     Bipolar Disorder, Type I, mixed manic episode, severe with psychotic features  Nicotine Use Disorder, severe  S/P bilateral mastectomy due to breast cancer  Hypothyroidism  Prolonged QTc      Clinically Significant Risk Factors Present on Admission                              # Financial/Environmental Concerns:                   Plan:   Target psychiatric symptoms and interventions:  Plan was to increase Seroquel to 250 mg at bedtime as previously recommended by and discussed with consulting psychiatric APRN on 8/6 (on admission, dose was 100 mg at bedtime) however, due to prolonged QTc, will HOLD Seroquel this evening per cards recs until repeat EKG in AM  Continue Lithium 450 mg at bedtime for now. If QTc is <500 in AM, would recommend increasing while Synthroid dose is also being adjusted as necessary by IM. PharmD in support of this plan.   Continue hydroxyzine 25 mg q4h prn for acute anxiety  Continue Trazodone 50 mg at bedtime prn for sleep disturbances  Continue Zyprexa 5-10 mg TID prn for severe agitation  Nicotine replacement available    Risks, benefits, and alternatives discussed at length with patient.     Medical Problems and Treatments:  Hypothyroidism  - Restart PTA Levothyroxine. Pt had been intermittently adherent  - Consult with IM to determine whether higher dose of Synthroid is indicated    Prolonged QTc  - Discussed with Cardiology who reviewed EKG results x 2 from today  - Repeat EKG in the AM    Pertinent labs/imaging:  Repeat EKG in the AM  Check HCG and UDS which were not ordered in ED    8/3:   Lithium level 0.45   TSH 24.61  T4 0.82    8/4:  CMP wnl  CBC with plts wnl    8/5:  COVID negative    MRI 4/2/24:  NDICATION: Headache. On chemo for breast  "cancer      TECHNIQUE:  MRI of the head with and without contrast using tumor protocol, 7 mL GADOBUTROL 1 MMOL/ML IV SOLN.     COMPARISON: None.     FINDINGS:  No evidence for acute infarct on the axial diffusion weighted images. Normal T2 and FLAIR signal within the brain parenchyma. Partially empty sella. The ventricles are normal in size and configuration. 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 No abnormal intra-axial enhancement. Mild nasal septal deviation to the left.     IMPRESSION:    1. No evidence for acute infarct.   2. No abnormal intra-axial signal or enhancement.   3. Partially empty sella which may represent normal variation.     Behavioral/Psychological/Social:  - Encourage unit programming    Safety:  - Safety precautions include: elopement  - Continue precautions as noted above  - Status 15 minute checks  - SIO    Legal Status: 72 hour hold. Petition for MI commitment with Houser filed in ED on 8/6 through [facility]. Prepetition screener will be meeting with patient today. Proposed Houser medications listed on petition include: Seroquel, Zyprexa, Haldol, and Abilify.    Disposition Plan   Reason for ongoing admission: poses an imminent risk to self and is unable to care for self due to severe psychosis or manuela  Discharge location: home with family  Discharge Medications: not ordered  Follow-up Appointments: not scheduled    Entered by: Ronna Murdock MD on 8/7/2024 at 8:33 AM            Chief Complaint:     \"My brain hurts\"         History of Present Illness:     Per ED Provider Note dated 8/3/24:    HPI   Fiorella Bartlett is a 38 year old female who presents with concern of racing thoughts.  Patient has had significant insomnia.  She has had difficulty sleeping for multiple nights in a row.  The patient reports that she has a history of breast cancer and is due for reconstructive surgery soon.  The patient is not suicidal or homicidal.  The patient takes Seroquel 100 " mg at night.  Recently she has been taking an extra tablet of the Seroquel at night to try to help her sleep.  She is on lithium.  She takes it at night.  No recent GI illness.  No change in the dosing of the lithium.  She has no medical concerns she wants addressed.  She denies any chest pain, abdominal pain or back pain.  No nausea, vomiting or diarrhea.  No fever or cough.  The patient has a psychiatrist, but has not seen the psychiatrist for a while.     MCKAYLA Bartlett is a very pleasant 38 year old female who presents with racing thoughts and insomnia.  The patient has bipolar.  She is to undergo breast reconstruction secondary to history of breast cancer soon.  This is caused some anxiety.  She is not suicidal or homicidal.  No visual auditory hallucinations.  She takes Seroquel and lithium at night.  No change in the dosing of either these, but she has been doubling her Seroquel to try to help her to sleep.  No GI illness.  Lithium level sent and found to be slightly subtherapeutic.  No recent GI illness or change in the dose.  Patient has no medical concerns she wants addressed.  She is here voluntarily.   is at bedside and supportive.  Patient is medically cleared at this time.  I called report to Олег at [facility].     Disposition   The patient was transferred to [facility].      Per DEC Assessment dated 8/4/24:    Referral Data and Chief Complaint  Fiorella Bartlett presents to the ED with family/friends. Patient is presenting to the ED for the following concerns: Significant behavioral change, Worsening psychosocial stress, Other (see comment) (insomnia, concerns for manic episode).   Factors that make the mental health crisis life threatening or complex are:  Pt presented to the ED with her  due to concerns for worsening insomnia and concerns for Pt starting to be in a manic episode. Per Pt's , Pt has struggled to sleep for the past several days, and has become increasingly  disorganized, confused, and at times displayed inappropriate affect. Pt shares she's been getting confused for the past few weeks, but struggles with sharing specifics. Pt identifies recent stressors with recently returning to work after a long medical leave due to treatment for breast cancer. Per Pt's , Pt has historically experienced manic episodes when experiencing increased work stress. Pt presents as very quiet, fatigued, cautious, confused, and disorganized. Pt is unable fully complete this assessment as she became very tearful when writer shared her 's concerns for Pt's lack of sleep and hoping Pt would be able to stabilize at [facility] to prevent inpatient hospitalization. Pt denies any SI, NSSIB, or HI.     Informed Consent and Assessment Methods  Explained the crisis assessment process, including applicable information disclosures and limits to confidentiality, assessed understanding of the process, and obtained consent to proceed with the assessment.  Assessment methods included conducting a formal interview with patient, review of medical records, collaboration with medical staff, and obtaining relevant collateral information from family and community providers when available.                 Patient response to interventions: acceptance expressed  Coping skills were attempted to reduce the crisis:  [facility]     History of the Crisis   Per Pt and chart review, Pt was diagnosed with Bipolar in 2019. Pt has been prescribed Lithium and Seroquel and reports she has been compliant with medications. Pt shares she has previous suicide attempts, with most recent back in 2014. Pt also endorses many past mental health diagnoses, but is unable to provide specifics and stated 'I don't know' when asked about past diagnoses of anxiety or depression. Pt denies any history of substance use, HI, or awareness of family mental health history.     Brief Psychosocial History  Family:  , Children  no  Support System:    Employment Status:  employed full-time  Source of Income:  salary/wages  Financial Environmental Concerns:  none  Current Hobbies:  other (see comments) (unable to answer)  Barriers in Personal Life:  other (see comments) (unable to sleep)     Significant Clinical History  Current Anxiety Symptoms:  excessive worry, anxious  Current Depression/Trauma:  crying or feels like crying, sadness  Current Somatic Symptoms:  anxious, excessive worry  Current Psychosis/Thought Disturbance:  forgetful (Pt unable to sleep for several days)  Current Eating Symptoms:     Chemical Use History:  Alcohol: None  Benzodiazepines: None  Opiates: None  Cocaine: None  Marijuana: None  Other Use: None   Past diagnosis:  Bipolar Disorder, Suicide attempt(s)  Family history:  No known history of mental health or chemical health concerns  Past treatment:  Individual therapy, Psychiatric Medication Management, Inpatient Hospitalization  Details of most recent treatment:  Pt unable to provide  Other relevant history:  Pt has been undergoing breast cancer treatment; recently returned to work in July. Has reconstructive surgery scheduled for August 20, 2024        Collateral Information  Is there collateral information: Yes      Collateral information name, relationship, phone number:  Daniel Nicholson (Spouse) [phone]     What happened today: She has been disorganized, can't remember what she was doing or what she said two minutes ago. She will just start giggling randomly, when nothing is happening. She is showing the early telltale signs before she has a manic episode      What is different about patient's functioning: Normally, her baseline is introspective, clear, organized. She's very capable, works fulltime      Concern about alcohol/drug use:  No     What do you think the patient needs:  to get some sleep; she might need inpatient hospitalization     Has patient made comments about wanting to kill  themselves/others: no     If d/c is recommended, can they take part in safety/aftercare planning:  yes     Risk Assessment  [place] Suicide Severity Rating Scale Full Clinical Version:  Suicidal Ideation  Q6 Suicide Behavior (Lifetime): no           [place] Suicide Severity Rating Scale Recent:   Suicidal Ideation (Recent)  Q1 Wished to be Dead (Past Month): no  Q2 Suicidal Thoughts (Past Month): no  Level of Risk per Screen: no risks indicated  Intensity of Ideation (Recent)  Frequency (Past 1 Month):  (denies, NA)  Duration (Past 1 Month):  (denies, NA)  Controllability (Past 1 Month):  (denies, NA)  Deterrents (Past 1 Month):  (denies, NA)  Reasons for Ideation (Past 1 Month):  (denies, NA)  Suicidal Behavior (Recent)  Actual Attempt (Past 3 Months): No  Total Number of Actual Attempts (Past 3 Months): 0  Actual Attempt Description (Past 3 Months): karime NA  Has subject engaged in non-suicidal self-injurious behavior? (Past 3 Months): No  Interrupted Attempts (Past 3 Months): No  Total Number of Interrupted Attempts (Past 3 Months): 0  Interrupted Attempt Description (Past 3 Months): denies, NA  Aborted or Self-Interrupted Attempt (Past 3 Months): No  Total Number of Aborted or Self-Interrupted Attempts (Past 3 Months): 0  Aborted or Self-Interrupted Attempt Description (Past 3 Months): denies, NA  Preparatory Acts or Behavior (Past 3 Months): No  Total Number of Preparatory Acts (Past 3 Months): 0  Preparatory Acts or Behavior Description (Past 3 Months): denies, NA     Environmental or Psychosocial Events: other life stressors, other (see comment) (recently returned to work following extended leave due to breast cancer treatment)  Protective Factors: Protective Factors: strong bond to family unit, community support, or employment, intact marriage or domestic partnership     Does the patient have thoughts of harming others? Feels Like Hurting Others: no  Previous Attempt to Hurt Others: no  Is the patient  engaging in sexually inappropriate behavior?: no     Is the patient engaging in sexually inappropriate behavior?  no         Mental Status Exam   Affect: Blunted, Flat  Appearance: Disheveled  Attention Span/Concentration: Inattentive  Eye Contact: Variable    Fund of Knowledge:  (unable to assess)   Language /Speech Content: Fluent  Language /Speech Volume: Soft  Language /Speech Rate/Productions: Slow  Recent Memory:  (unable to assess)  Remote Memory:  (unable to assess)  Mood: Anxious, Sad, Apathetic  Orientation to Person: Yes   Orientation to Place: Yes  Orientation to Time of Day: Yes  Orientation to Date: Yes     Situation (Do they understand why they are here?): Answer (please comment) (unable to assess; Pt appeared confused at times but understands she's in [facility])  Psychomotor Behavior: Underactive  Thought Content: Clear  Thought Form: Other (please comment) (confused and disorganized)     Medication  Psychotropic medications:   Medication Orders - Psychiatric (From admission, onward)        Start     Dose/Rate Route Frequency Ordered Stop     08/03/24 2200   lithium (ESKALITH CR/LITHOBID) CR tablet 450 mg         450 mg Oral AT BEDTIME 08/03/24 2136 08/03/24 2136   QUEtiapine (SEROquel) tablet 100 mg         100 mg Oral 3 TIMES DAILY PRN 08/03/24 2137 08/03/24 2127   hydrOXYzine HCl (ATARAX) tablet 50 mg         50 mg Oral EVERY 6 HOURS PRN 08/03/24 2129                  Current Care Team  Patient Care Team:  No Ref-Primary, Physician as PCP - General  [facility] Sheela Matthews MD as Assigned Surgical Provider     Diagnosis  Problem List        Patient Active Problem List   Diagnosis Code    Mental health disorder F99    Sleep deprivation Z72.820    Bipolar 1 disorder (H) F31.9    Bipolar disorder, current episode depressed, severe, with psychotic features (H) F31.5    Suicide attempt by substance overdose (H) T65.92XA    Insomnia, unspecified type G47.00             Primary Problem This Admission  Active Hospital Problems    Insomnia, unspecified type       Bipolar 1 disorder (H)           Clinical Summary and Substantiation of Recommendations   Pt presented to the ED with her  due to concerns for worsening insomnia and potentially starting to be in a manic episode. Upon meeting Pt, she presents as confused, fatigued, and disoriented. Writer attempted to meet with Pt three times this morning, and each time she expressed feeling tired and was going to try to sleep, then offered to get sleep before completing DEC, and then would be observed 10 minutes walking around the milieu again. Pt does not appear to fully track questions or situation, but is responsive to interventions and is willing to stay at [facility]. Pt will benefit from remaining on [facility] under observation, with goal to stablize current insomnia and ideally prevent full-blown manic episode.        Patient coping skills attempted to reduce the crisis:  came to [facility]     Disposition  Recommended disposition: Observation        Reviewed case and recommendations with attending provider. Attending Name: Amber Joyce CNP, APRN       Attending concurs with disposition: yes       Patient and/or validated legal guardian concurs with disposition:   yes        Final disposition:  observation     Legal status on admission: Voluntary/Patient has signed consent for treatment       Per Psychiatric provider note dated 8/5/24:    HPI  Fiorella Bartlett is a 38 year old female with a past history notable for a history of bipolar disorder who presented to the emergency department voluntarily with an increase in racing thoughts and insomnia. She was cleared medically and transferred to [facility] for additional assessment and treatment planning. Patient is nearing 39 hours in emergency care.      Patient seen for followup today.  She presents with a blunted affect and minimal spontaneous speech.  When asked about the events leading up  to her emergency department visit, patient reports that she came here voluntarily.  She is unable to describe what prompted her to come into the emergency department.  She notes that she would like to go home at this point, and believes she would sleep better at home.  She reports in the past she has been able to get some meds in the emergency department which she can then take at home.  She believes trazodone up to 150 mg has been helpful in the past.  She reports she has been taking quetiapine 100 mg nightly, and sometimes will take an additional 100 mg as needed if experiencing difficulty sleeping.  She has seen a psychiatrist and says she has been refilling her medications regularly.  She is oriented to month and year, believing it to be August 3.  She was able to identify that she has at [facility].  She does endorse feeling slightly confused, states she is experiencing difficulty focusing and paying attention.  She is easily distracted, and is experiencing difficulty maintaining a linear train of thought.  She endorses a stable appetite, but noted by nursing staff to be eating minimally here on the unit.  She denies that she has spoken to her  today, but notes that she would be amenable to following his recommendation regarding staying an additional night or returning home this evening.    Patient presenting with worsening insomnia, confusion, and lack of ability to function at her baseline. Hx of bipolar I disorder. TSH checked, noted to be high, T4 low. Synthroid restarted. Will plan to increase HS quetiapine and continue lithium at current dose while rechecking a level tomorrow morning. Will trial lorazepam for symptoms of catatonia. Nursing notes reviewed noting no acute issues.      I have reviewed the assessment completed by the [facility].      Preliminary diagnosis:      ICD-10-CM     1. Bipolar I disorder, most recent episode depressed (H)  F31.30         2. Insomnia, unspecified  type  G47.00     3.      R/o catatonia         Treatment Plan:  - Will offer lorazepam 1 mg once now. Patient presenting with possible mild catatonia symptoms with delayed responses, flat affect, poor PO intake. Will assess response to lorazepam and schedule lorazepam 0.5 mg tid if demonstrating positive response.   - Will schedule quetiapine 200 mg at bedtime for insomnia and mood stabilization.  - Continue lithium  mg at bedtime for mood stabilization in conjunction with quetiapine  - Reviewed TSH and T4, TSH elevated at 29.64 and T4 low at 0.68. Levothyroxine has been restarted at 100 mcg daily. Recommend patient follow-up upon discharge for ongoing monitoring.   - Lithium level was 0.45, drawn last evening around 2045. Will repeat 12 hour level tomorrow morning.   Given grossly disorganized thought process, confusion, and lack of insight, will place patient on a 72 hour hold. Plan to pursue inpatient psychiatric hospitalization for further stabilization. If not responding to above interventions, we may need to pursue commitment.      --    Per Psychiatric provider note dated 8/6/24:    Fiorella Bartlett is a 38 year old female who presents to ED 8/3 with concern of racing thoughts.  Patient has had significant insomnia. Per Pt's , Pt has struggled to sleep for the past several days, and has become increasingly disorganized, confused, and at times displayed inappropriate affect.      [facility] but was disorganized and attempted to elope from [facility] prompting 72HH and transfer to [facility]. She was seen by psychiatric empath provider and started on Ativan for possible catatonia.      On interview, patient was knocking on the glass, staring at provider. She was seen sitting on the floor of her room. She was unable to verbalize how she felt besides stating she wanted to go home.           DSM-5 Diagnosis:   Bipolar 1 disorder mixed            Assessment:   Patient presenting  "with worsening insomnia, confusion, and lack of ability to function at her baseline. Hx of bipolar I disorder. TSH checked, noted to be high, T4 low. Synthroid restarted. Lithium level subtherapeutic, however concerned about her hypothyroidisms, can consider adjusting in inpatient psychiatry.            Summary of Recommendations:      Increase seroquel to 250mg for insomnia and mood stabilization.   Continue Lithium 450mg at bedtime for now given hypothyroidism  Continue 72HH -- given continued grossly disorganized thought process, confusion, and lack of insight will petition for MI commitment to ensure patient can transfer to  psychiatry        Per my interview with patient:    [place] [facility] [place] [facility] [place] When greeted by writer, she made minimal eye contact, appeared fearful and suspicious though reluctantly agreed to meet with writer privately in interview room. During the interview, she was quite distracted by noises in the hallway outside of the door. She said that she is \"overwhelmed by the noise,\" and admitted that she had significant difficulty processing and answering questions regarding her background. Patient stated that she began chemotherapy in 11/2023 for breast cancer and completed treatment on 4/24. In 12/2023, the dose of Lithium and Seroquel were reportedly reduced to minimize drug interactions while patient was undergoing chemotherapy treatments. She stated that during this time, \"I felt better actually.\" She felt \"stable.\" However, she began experiencing racing thoughts, paranoia, heightened anxiety, insomnia, and depressive symptoms shortly after returning to work from medical leave in 7/2024. She added that she is also concerned she may be pregnant due to \"unprotected sex about 1-2 weeks ago.\" She noted that this has also been a significant stressor for her.     When asked to describe mood changes prior to her admission, she said \"I don't know.\" She stated that she " "had not experienced SI thoughts recently, but they emerged last evening following admission to the unit because \"this is traumatizing and I don't feel safe here.\" She denied SI plan but would not elaborate further when asked to do so. She was very guarded and tearful when discussing SI thoughts. She ultimately would not contract for safety as she admitted that even if SI thoughts persisted outside of the hospital, she would not return to the hospital or call 911. She was able to identify protective factors, including her family and friends. She stated that she would be better able to cope with SI thoughts at home \"in a quiet environment.\" She denied HI. She denied recent illicit substance use. She admitted to missing multiple doses of Synthroid for unclear reasons. Stated that she has been adherent with Seroquel and Lithium.               Psychiatric Review of Systems:   Unable to complete full ROS due to patient's disorganized thought process and paranoia. She reports current passive SI, depressed mood, anxiety, paranoia, noise sensitivity, sleeplessness, poor appetite, racing thoughts. Denies active SI and HI.            Medical Review of Systems:     Review of systems positive for headache  10 point review of systems is otherwise negative unless noted above.            Psychiatric History:   Prior diagnoses: Bipolar Disorder, with psychosis during acute severe mood episodes (initially diagnosed during inpatient psychiatric treatment at [facility] 3/24/2019, previously MDD, with history of severe episodes including psychosis - first in 2015), DANDRE.   Psychiatric Hospitalizations: Multiple, most recently in 2021 at [facility]  History of Psychosis: Yes, in setting of manuela and depression  Prior ECT: None  Court Commitment: None  Suicide Attempts: Previous suicide attempt via overdose on Seroquel in 2021. Pt reports previous suicide attempt in 2015 where she was psychotic and overdosed on Tylenol and Vodka. Possible " "quetiapine overdose 8/20/2021.   Self-injurious Behavior: self harm as a teenager per chart review.   Violence toward others: None per patient  Use of Psychotropics:   Past medications:   - Seroquel  - Latuda (this was not a recently active medication - per 8/20/2021 [facility] - Psychiatric Consultation note by WENDY Jiang NP: \"Pt has been stable on quetiapine 100mg at bedtime for several years (per pt). She was trialed on lurasidone several years ago but discontinued it because it made her feel depressed.\")  - Hydroxyzine   - Klonopin (of note [facility] database search yielded no record of controlled substance prescriptions for Fiorella Bartlett or Jenna Bartlett 1985 for 8/21/20 to 8/21/2021)    As per Dr Yi note on 5/2019.  \"Pt seen for an initial evaluation in December of 2015. she had been hospitalized with a psychotic depression. She was followed closely, and attempted to stop medication over the next couple of years but when stressed at work had a second episode and was hospitalized a second time.Pt was seen on 3/10/17 after being discharged from the hospital in February, she was admitted with psychosis and depression. In April she reamained stable    Seen for follow up on 5/8/17. Reluctantly agreed to continue current medication and working with therapist.  Seen for follow up on6/19/17. Appeared better, Geodon reduced to 20 mg qd and Lexapro 10 mg continued. Therapy encouraged.Seen for follow up on 7/31/17. Appeared well We agreed that Geodona and Lexapro should not change untill back from [facility]  Seen for follow up on 11/27/17. She was very stable, so Geodon was stopped and Lexapro continued. she was enouraged to use Trazodone if necessary for insomni and to call with any concerns.  Seen for follow up on 1/15/18. She reported feeling very stable and denied concerns. Lexapro was continued, as well as trazodone prn insomnia.  Seen for follow up on 5/7/18. she was doing well, had " "used a light box over the winter with improvement in low mood and fatigue. Lexapro was contiued, as well as trazodone prn insomnia.  Seen for follow up on 9/17/18. she had noted some symptoms when stressed at work this summer, but made an effeort to work at self care and they reolved. States it was a wake up call for her to stay on medications. Doing well and agrees to start her light box again now. No medication changes.  Seen for follow up in December, she was doing well and no changes were made  In February  called and reported pt was suffering psychotic symptoms and seemed confused. He agreed to take her to [facility].   3/18/19. Recoviering from third episode of depresion and anxiety with psychosis. lexapro increased to 20 mg seroquel to use 25 mg po qhs prn.  Seen for follow up on   4/15/19. she had been hospitalized agian at [facility] on 3/24 and recieved diagnosis of bipolar disorder. this was discussed at length. clonaepam 0.5 mg po q noon and 6 pm started for akathesia. Latuda and seroquel continued.  Seen for follow up on 4/24/19. Very tired, doing bit better. Still sad and worried about diagnosis. \Reduced Seroquel to 150 mg  Today patient states that she is taking Latuda and clonazepam at noon. She tries to avoid taking the second clonazepam but she is having some restlessness in the evenings, She is going to [employer] three times a week. Hopes to start work agin part time next week and full time the week after. At that time she will be finished with [employer]. She is still struggling a lot with sadness. \"It comes and goes.\" Still very sad about the diagnosis and fears of developing psychosis. It is most frightening to her that it seems so out of her control. She is also afraid of taking on too many things because she correlates her episodes of psychosis with increased stress. She reports that anxiety is the most persistent symptoms she struggles with. She is concerned that Latuda makes anxiety worse. She " "has been reluctant to discuss very much in group therapy however. She is still sleeping a lot and also napping. The moring sedation is a little better on the lower Seroquel, and she does not think there have been any negative effects of reducing the Seroquel. \"         Substance Use History:   Alcohol: none  Cannabis: none  Nicotine: none  Cocaine: none  Methamphetamine: none  Opiates/Heroin: none  Benzodiazepines: none  Hallucinogens: none  Inhalants: none    Prior Chemical Dependency treatment: none          Social History:   Upbringing: [place]. 2009 moved to [place] for work. It was difficult to find a job.   Educational History: Masters in career and technical education  Relationships:  for 12 years.  Children: \"I have a cat and I may be expecting.\"   Current Living Situation: She lives at home with her  and cat Pimentel.   Occupational History:  at [employer]. On medical leave intermittently since 11/22/23 when she started chemotherapy. She returned to work full time in July, 2024. Also working part time for [employer].   Financial Support: self  Legal History:underage drinking high school   Abuse/Trauma History: \"The most trauma I have is here [hospital] when I am trying to get better.\"          Family History:     Family History:    Sister: anxiety  Father: prostate cancer, hypertension, hyperlipidemia  Mother: hypertension, thyroid disorder   Maternal grandfather: diabetes  Maternal grandmother: cancer, cataracts  Paternal grandfather: heart disease, hyperlipidemia          Past Medical History:     Past Medical History:   Diagnosis Date    Breast cancer (H)     Depressive disorder      Denies history of: hepatitis, HIV, head trauma with or without loss of consciousness and seizures     Last menses was in 12/2023         Past Surgical History:     Past Surgical History:   Procedure Laterality Date    IR CHEST PORT PLACEMENT > 5 YRS OF AGE  11/7/2023 " "             Allergies:      Allergies   Allergen Reactions    Pcn [Penicillins] Unknown     Has been told she was allergic since she was a child.               Medications:   I have reviewed this patient's current medications  Medications Prior to Admission   Medication Sig Dispense Refill Last Dose    Cholecalciferol (VITAMIN D3 PO) Take 1 tablet by mouth daily        levothyroxine (SYNTHROID/LEVOTHROID) 100 MCG tablet Take 100 mcg by mouth daily       lithium (ESKALITH CR/LITHOBID) 450 MG CR tablet Take 450 mg by mouth at bedtime       QUEtiapine (SEROQUEL) 100 MG tablet Take 200 mg by mouth at bedtime                Labs:   No results found for this or any previous visit (from the past 24 hour(s)).    /83 (BP Location: Right arm, Patient Position: Sitting, Cuff Size: Adult Regular)   Pulse 100   Temp 98.5  F (36.9  C) (Oral)   Resp 18   Ht 1.626 m (5' 4\")   Wt 63.5 kg (140 lb)   SpO2 100%   BMI 24.03 kg/m    Weight is 140 lbs 0 oz  Body mass index is 24.03 kg/m .         Psychiatric Mental Status Examination:   Appearance: awake, fearful of others in lounge, not eating her lunch  Attitude: dismissive, guarded, paranoid  Eye Contact: poor on approach and fair during interview  Mood:  \"I don't know\"  Affect: mood congruent and fearful, depressed and anxious  Speech:  delayed, paucity of speech  Language: fluent in English  Psychomotor Behavior:  no evidence of tardive dyskinesia, dystonia, or tics  Gait/Station: normal  Thought Process:  somewhat linear, illogical, disorganized  Associations:  no loose associations  Thought Content:  Reporting passive SI. Denying active SI/HI/AVH; evidence of paranoia, possible delusional thought content  Insight:  limited  Judgement: limited  Oriented to:  time, person, and place  Attention Span and Concentration: quite impaired  Recent and Remote Memory:  impaired with regards to events leading to her hospital stay  Fund of Knowledge: appropriate           Physical " Exam:   Please refer to physical exam completed by ED provider, Behzad Vora DO, on 8/3/24. I agree with the findings and assessment and have no additional findings to add at this time.

## 2024-08-07 NOTE — PLAN OF CARE
08/06/24 1900   General Information (Adult)   Patient Belongings Put in Hospital Secure Location (Security or Locker, etc.) clothing     Goal Outcome Evaluation:

## 2024-08-07 NOTE — PLAN OF CARE
08/07/24 1318   Individualization/Patient Specific Goals   Patient Personal Strengths ability to maintain sobriety;family/social support;expressive of emotions;intellectual cognitive skills;interests/hobbies;medication/treatment adherence;stable living environment;positive educational history;positive vocational history;resilient   Patient Vulnerabilities history of unsuccessful treatment   Behavioral Team Discussion   Participants Ronna Murdock MD; Griselda Escobedo, RN; Imelda Villagran, nurse intern; JOSE Marshall   Progress Minimal   Anticipated length of stay 7-10 days   Continued Stay Criteria/Rationale Fiorella presents following increase in racing thoughts, insomnia, and concern for manuela and confusion. While in the ED, a petition for civil commitment and [place] was filed - team is awaiting decision of support from [facility] Pre-Petition Screening team. She requires symptom stabilization, medication management, and supportive discharge plan.   Medical/Physical Breast cancer - recently completed chemotherapy. Medications at a lower dose due to treatments and provider plans to consult with pharmacist to discuss best plan of action given hyperthroidism.   Precautions Assault, Cheeking, Elopement   Plan Gather collateral, work to adjust medications to target symptoms and improve sleep, coordinate with [facility] regarding petition for commitment/[place], work to increase outpatient supports.   Anticipated Discharge Disposition home with family     PRECAUTIONS AND SAFETY    Behavioral Orders   Procedures    Assault precautions    Cheeking Precautions (behavioral units)    Code 1 - Restrict to Unit    Elopement precautions    Routine Programming     As clinically indicated    Status 15     Every 15 minutes.    Status Individual Observation     Patient SIO status reviewed with team/RN.  Please also refer to RN/team documentation for add'l detail.    -SIO staff to monitor following which have  contributed to patient being on SIO:  Confusion, disorientation resulting in agitation, assault risk  -Possible interventions SIO staff could use to support patient's treatment progress:  Provide reassurance and reorientation  -When following observed, team will review discontinuation of SIO:     Order Specific Question:   CONTINUOUS 24 hours / day     Answer:   Other     Order Specific Question:   Specify distance     Answer:   10     Order Specific Question:   Indications for SIO     Answer:   Assault risk     Order Specific Question:   Indications for SIO     Answer:   Severe intrusiveness

## 2024-08-08 LAB
ATRIAL RATE - MUSE: 74 BPM
ATRIAL RATE - MUSE: 75 BPM
DIASTOLIC BLOOD PRESSURE - MUSE: NORMAL MMHG
DIASTOLIC BLOOD PRESSURE - MUSE: NORMAL MMHG
GLUCOSE BLDC GLUCOMTR-MCNC: 111 MG/DL (ref 70–99)
INTERPRETATION ECG - MUSE: NORMAL
INTERPRETATION ECG - MUSE: NORMAL
P AXIS - MUSE: 40 DEGREES
P AXIS - MUSE: 41 DEGREES
PR INTERVAL - MUSE: 152 MS
PR INTERVAL - MUSE: 158 MS
QRS DURATION - MUSE: 68 MS
QRS DURATION - MUSE: 70 MS
QT - MUSE: 380 MS
QT - MUSE: 542 MS
QTC - MUSE: 424 MS
QTC - MUSE: 601 MS
R AXIS - MUSE: 16 DEGREES
R AXIS - MUSE: 30 DEGREES
SYSTOLIC BLOOD PRESSURE - MUSE: NORMAL MMHG
SYSTOLIC BLOOD PRESSURE - MUSE: NORMAL MMHG
T AXIS - MUSE: 51 DEGREES
T AXIS - MUSE: 56 DEGREES
VENTRICULAR RATE- MUSE: 74 BPM
VENTRICULAR RATE- MUSE: 75 BPM

## 2024-08-08 PROCEDURE — 250N000013 HC RX MED GY IP 250 OP 250 PS 637: Performed by: PHYSICIAN ASSISTANT

## 2024-08-08 PROCEDURE — 250N000013 HC RX MED GY IP 250 OP 250 PS 637: Performed by: NURSE PRACTITIONER

## 2024-08-08 PROCEDURE — 99232 SBSQ HOSP IP/OBS MODERATE 35: CPT | Performed by: NURSE PRACTITIONER

## 2024-08-08 PROCEDURE — 93005 ELECTROCARDIOGRAM TRACING: CPT

## 2024-08-08 PROCEDURE — 250N000013 HC RX MED GY IP 250 OP 250 PS 637: Performed by: PSYCHIATRY & NEUROLOGY

## 2024-08-08 PROCEDURE — 124N000002 HC R&B MH UMMC

## 2024-08-08 PROCEDURE — 93010 ELECTROCARDIOGRAM REPORT: CPT | Performed by: INTERNAL MEDICINE

## 2024-08-08 RX ORDER — HYDROXYZINE HYDROCHLORIDE 25 MG/1
25 TABLET, FILM COATED ORAL 3 TIMES DAILY PRN
Qty: 30 TABLET | Refills: 0 | Status: SHIPPED | OUTPATIENT
Start: 2024-08-08

## 2024-08-08 RX ORDER — OLANZAPINE 2.5 MG/1
2.5 TABLET, FILM COATED ORAL EVERY MORNING
Status: DISCONTINUED | OUTPATIENT
Start: 2024-08-08 | End: 2024-08-09

## 2024-08-08 RX ORDER — QUETIAPINE FUMARATE 100 MG/1
100-200 TABLET, FILM COATED ORAL AT BEDTIME
Qty: 60 TABLET | Refills: 0 | Status: SHIPPED | OUTPATIENT
Start: 2024-08-08

## 2024-08-08 RX ORDER — OLANZAPINE 5 MG/1
5 TABLET ORAL AT BEDTIME
Status: DISCONTINUED | OUTPATIENT
Start: 2024-08-08 | End: 2024-08-09

## 2024-08-08 RX ORDER — LEVOTHYROXINE SODIUM 112 UG/1
112 TABLET ORAL
Qty: 30 TABLET | Refills: 0 | Status: SHIPPED | OUTPATIENT
Start: 2024-08-09

## 2024-08-08 RX ORDER — LITHIUM CARBONATE 450 MG
450 TABLET, EXTENDED RELEASE ORAL AT BEDTIME
Qty: 30 TABLET | Refills: 0 | Status: SHIPPED | OUTPATIENT
Start: 2024-08-08 | End: 2024-08-14

## 2024-08-08 RX ORDER — QUETIAPINE FUMARATE 100 MG/1
100-200 TABLET, FILM COATED ORAL AT BEDTIME
Status: DISCONTINUED | OUTPATIENT
Start: 2024-08-08 | End: 2024-08-08

## 2024-08-08 RX ORDER — FLUOXETINE 10 MG/1
10 CAPSULE ORAL DAILY
Status: DISCONTINUED | OUTPATIENT
Start: 2024-08-08 | End: 2024-08-09

## 2024-08-08 RX ADMIN — MAGNESIUM HYDROXIDE 30 ML: 400 SUSPENSION ORAL at 23:49

## 2024-08-08 RX ADMIN — TRAZODONE HYDROCHLORIDE 50 MG: 50 TABLET ORAL at 01:09

## 2024-08-08 RX ADMIN — HYDROXYZINE HYDROCHLORIDE 25 MG: 25 TABLET, FILM COATED ORAL at 17:06

## 2024-08-08 RX ADMIN — ACETAMINOPHEN 650 MG: 325 TABLET, FILM COATED ORAL at 10:10

## 2024-08-08 RX ADMIN — LEVOTHYROXINE SODIUM 112 MCG: 112 TABLET ORAL at 09:25

## 2024-08-08 RX ADMIN — HYDROXYZINE HYDROCHLORIDE 25 MG: 25 TABLET, FILM COATED ORAL at 01:09

## 2024-08-08 RX ADMIN — OLANZAPINE 5 MG: 5 TABLET, FILM COATED ORAL at 21:31

## 2024-08-08 RX ADMIN — ACETAMINOPHEN 650 MG: 325 TABLET, FILM COATED ORAL at 14:17

## 2024-08-08 RX ADMIN — SENNOSIDES AND DOCUSATE SODIUM 1 TABLET: 50; 8.6 TABLET ORAL at 21:31

## 2024-08-08 RX ADMIN — OLANZAPINE 2.5 MG: 2.5 TABLET, FILM COATED ORAL at 13:42

## 2024-08-08 RX ADMIN — HYDROXYZINE HYDROCHLORIDE 25 MG: 25 TABLET, FILM COATED ORAL at 10:10

## 2024-08-08 RX ADMIN — Medication 25 MCG: at 09:25

## 2024-08-08 ASSESSMENT — ACTIVITIES OF DAILY LIVING (ADL)
ADLS_ACUITY_SCORE: 45
ADLS_ACUITY_SCORE: 45
HYGIENE/GROOMING: INDEPENDENT
ADLS_ACUITY_SCORE: 45
DRESS: SCRUBS (BEHAVIORAL HEALTH)
ADLS_ACUITY_SCORE: 45
ORAL_HYGIENE: INDEPENDENT
ADLS_ACUITY_SCORE: 45

## 2024-08-08 NOTE — PROGRESS NOTES
"[facility], [place]   Psychiatric Progress Note        Interim History:   Team meeting report: The patient's care was discussed with the treatment team during the daily team meeting and/or staff's chart notes were reviewed.  Staff report patient has been confused.  She is not bile, guarded, paranoid of staff.  The patient vacillates between being gray and tearful. She is disorganized and loudly.  Needs multiple redirections.  Apologizing on and off for being \"agitated\".  Requested medications for headache but then declined outlined them.  During the night, the patient laid on the floor in the lounge.  She was tearful.  Reported anxiety.  She was was running around the unit.  Slept for 6 hours.    Met with patient  several times. She is in bed.  She is having an ice pack over her head.  Appears to be in a lot of pain.  She is crying.  States she has a headache.  The patient was oriented to date, place, self, and partially to situation.  Believes she is here because she was confused.  Adamantly denies auditory and visual hallucinations.  Reports that yesterday she was seeing things but not today.  Reports poor appetite.  Also states she does not want to eat so she does not gain weight.  Denies feeling depressed.  Anxiety is high.  Denies suicidal ideation.  Denies thoughts of harming someone else.  The patient asked this provider twice if she can go to the hospital.  Assured her that that she is already in the hospital.  Also the patient is convinced that she is pregnant and wants to go to the birth center.  Assured her that her lab work indicate that she is not pregnant.  The patient is not convinced.  The patient was notified that the court did not support the petition and he decided to leave.  Her  was not agreeable with the plan stating that she needs help.  He was able to convince her to stay in the hospital for few days.  While talking to the , the patient lower herself " to the floor; she did not fall.    Spoke with patient's  and sister while they were visiting unit.  The patient appeared to be hallucinating in the last week.  She will laugh or get angry without provocation.  It is not clear if she has been taking her medications as prescribed.  The patient has been on Seroquel for many years.  She has been on lithium for about 2 years.  Remembers that she has been on antidepressants but they have made her manic.  The patient has been hospitalized multiple times.  The patient is working full-time and is stressed out at work.         Medications:     Current Facility-Administered Medications   Medication Dose Route Frequency Provider Last Rate Last Admin    FLUoxetine (PROzac) capsule 10 mg  10 mg Oral Daily Radha Morin APRN CNP        levothyroxine (SYNTHROID/LEVOTHROID) tablet 112 mcg  112 mcg Oral QAM AC Idalmis Jc PA   112 mcg at 08/08/24 0925    [START ON 8/9/2024] OLANZapine (zyPREXA) tablet 2.5 mg  2.5 mg Oral Radha Maguire APRN CNP        OLANZapine (zyPREXA) tablet 5 mg  5 mg Oral At Bedtime Radha Morin APRN CNP        senna-docusate (SENOKOT-S/PERICOLACE) 8.6-50 MG per tablet 1 tablet  1 tablet Oral At Bedtime Idalmis Jc PA   1 tablet at 08/07/24 2029    Vitamin D3 (CHOLECALCIFEROL) tablet 25 mcg  25 mcg Oral Daily Idalmis Jc PA   25 mcg at 08/08/24 0925            Allergies:     Allergies   Allergen Reactions    Pcn [Penicillins] Unknown     Has been told she was allergic since she was a child.             Labs:     Recent Results (from the past 672 hour(s))   Lithium level    Collection Time: 08/03/24  8:44 PM   Result Value Ref Range    Lithium 0.45 (L) 0.60 - 1.20 mmol/L   TSH with free T4 reflex    Collection Time: 08/03/24  8:44 PM   Result Value Ref Range    TSH 24.61 (H) 0.30 - 4.20 uIU/mL   T4 free    Collection Time: 08/03/24  8:44 PM   Result Value Ref Range    Free T4 0.82 (L) 0.90 -  1.70 ng/dL   TSH with free T4 reflex    Collection Time: 08/04/24  7:44 PM   Result Value Ref Range    TSH 29.64 (H) 0.30 - 4.20 uIU/mL   T4 free    Collection Time: 08/04/24  7:44 PM   Result Value Ref Range    Free T4 0.68 (L) 0.90 - 1.70 ng/dL   Comprehensive metabolic panel    Collection Time: 08/04/24  7:44 PM   Result Value Ref Range    Sodium 138 135 - 145 mmol/L    Potassium 4.0 3.4 - 5.3 mmol/L    Carbon Dioxide (CO2) 23 22 - 29 mmol/L    Anion Gap 11 7 - 15 mmol/L    Urea Nitrogen 11.7 6.0 - 20.0 mg/dL    Creatinine 0.79 0.51 - 0.95 mg/dL    GFR Estimate >90 >60 mL/min/1.73m2    Calcium 9.5 8.8 - 10.4 mg/dL    Chloride 104 98 - 107 mmol/L    Glucose 100 (H) 70 - 99 mg/dL    Alkaline Phosphatase 62 40 - 150 U/L    AST 21 0 - 45 U/L    ALT 18 0 - 50 U/L    Protein Total 6.6 6.4 - 8.3 g/dL    Albumin 4.5 3.5 - 5.2 g/dL    Bilirubin Total 0.2 <=1.2 mg/dL   CBC with platelets    Collection Time: 08/05/24  2:51 PM   Result Value Ref Range    WBC Count 6.0 4.0 - 11.0 10e3/uL    RBC Count 4.29 3.80 - 5.20 10e6/uL    Hemoglobin 13.3 11.7 - 15.7 g/dL    Hematocrit 38.8 35.0 - 47.0 %    MCV 90 78 - 100 fL    MCH 31.0 26.5 - 33.0 pg    MCHC 34.3 31.5 - 36.5 g/dL    RDW 11.9 10.0 - 15.0 %    Platelet Count 214 150 - 450 10e3/uL   Asymptomatic COVID-19 Virus (Coronavirus) by PCR Nasopharyngeal    Collection Time: 08/05/24  4:25 PM    Specimen: Nasopharyngeal; Swab   Result Value Ref Range    SARS CoV2 PCR Negative Negative   EKG 12-lead, complete    Collection Time: 08/07/24 11:29 AM   Result Value Ref Range    Systolic Blood Pressure  mmHg    Diastolic Blood Pressure  mmHg    Ventricular Rate 74 BPM    Atrial Rate 74 BPM    NH Interval 152 ms    QRS Duration 68 ms     ms    QTc 601 ms    P Axis 40 degrees    R AXIS 16 degrees    T Axis 51 degrees    Interpretation ECG       Sinus rhythm  Possible Left atrial enlargement  Septal infarct (cited on or before 20-Aug-2021)  Abnormal ECG  When compared with ECG of  20-Aug-2021 12:35,  Vent. rate has decreased by  36 bpm  Questionable change in QRS axis     EKG 12-lead, complete    Collection Time: 08/07/24  2:10 PM   Result Value Ref Range    Systolic Blood Pressure  mmHg    Diastolic Blood Pressure  mmHg    Ventricular Rate 75 BPM    Atrial Rate 75 BPM    CO Interval 158 ms    QRS Duration 70 ms     ms    QTc 424 ms    P Axis 41 degrees    R AXIS 30 degrees    T Axis 56 degrees    Interpretation ECG       Sinus rhythm with sinus arrhythmia  Septal infarct (cited on or before 20-Aug-2021)  Abnormal ECG  When compared with ECG of 07-Aug-2024 11:29, (unconfirmed)  QT has shortened  Confirmed by MD SHAYAN, LUIS ([id number]) on 8/8/2024 8:44:53 AM     HCG qualitative urine    Collection Time: 08/07/24  6:59 PM   Result Value Ref Range    hCG Urine Qualitative Negative Negative   Urine Drug Screen Panel    Collection Time: 08/07/24  6:59 PM   Result Value Ref Range    Amphetamines Urine Screen Negative Screen Negative    Barbituates Urine Screen Negative Screen Negative    Benzodiazepine Urine Screen Negative Screen Negative    Cannabinoids Urine Screen Negative Screen Negative    Cocaine Urine Screen Negative Screen Negative    Fentanyl Qual Urine Screen Negative Screen Negative    Opiates Urine Screen Negative Screen Negative    PCP Urine Screen Negative Screen Negative   EKG 12-lead, complete    Collection Time: 08/08/24  8:59 AM   Result Value Ref Range    Systolic Blood Pressure  mmHg    Diastolic Blood Pressure  mmHg    Ventricular Rate 64 BPM    Atrial Rate 64 BPM    CO Interval 170 ms    QRS Duration 70 ms     ms    QTc 425 ms    P Axis 39 degrees    R AXIS 29 degrees    T Axis 52 degrees    Interpretation ECG       Sinus rhythm with sinus arrhythmia  Septal infarct (cited on or before 20-Aug-2021)  Abnormal ECG  When compared with ECG of 07-Aug-2024 14:10,  No significant change was found     Glucose by meter    Collection Time: 08/08/24 12:26 PM   Result Value Ref  Range    GLUCOSE BY METER POCT 111 (H) 70 - 99 mg/dL            Precautions:     Behavioral Orders   Procedures    Assault precautions    Cheeking Precautions (behavioral units)    Code 1 - Restrict to Unit    Elopement precautions    Routine Programming     As clinically indicated    Status 15     Every 15 minutes.    Status Individual Observation     Patient SIO status reviewed with team/RN.  Please also refer to RN/team documentation for add'l detail.    -SIO staff to monitor following which have contributed to patient being on SIO:  Confusion, disorientation resulting in agitation, assault risk  -Possible interventions SIO staff could use to support patient's treatment progress:  Provide reassurance and reorientation  -When following observed, team will review discontinuation of SIO:     Order Specific Question:   CONTINUOUS 24 hours / day     Answer:   Other     Order Specific Question:   Specify distance     Answer:   10     Order Specific Question:   Indications for SIO     Answer:   Assault risk     Order Specific Question:   Indications for SIO     Answer:   Severe intrusiveness    Suicide precautions: Suicide Risk: MODERATE; Clinical rationale to override score: modification to the care environment     Patients on Suicide Precautions should have a Combination Diet ordered that includes a Diet selection(s) AND a Behavioral Tray selection for Safe Tray - with utensils, or Safe Tray - NO utensils       Order Specific Question:   Suicide Risk     Answer:   MODERATE     Order Specific Question:   Clinical rationale to override score:     Answer:   modification to the care environment            Psychiatric Examination:   Temp: 99  F (37.2  C) Temp src: Oral BP: (!) 142/100 Pulse: 114   Resp: 16 SpO2: 99 % O2 Device: None (Room air)    Weight is 140 lbs 0 oz  Body mass index is 24.03 kg/m .    Appearance: awake, alert and adequately groomed  Attitude:  cooperative  Eye Contact:   labile  Mood:  anxious and  depressed  Affect:  mood congruent  Speech:  mumbling  Psychomotor Behavior:  no evidence of tardive dyskinesia, dystonia, or tics  Throught Process:  linear, disorganized, illogical, and tangental  Associations:  no loose associations  Thought Content:  no evidence of suicidal ideation or homicidal ideation and paranoia, delusions  Insight:  limited  Judgement:  limited  Oriented to:  time, person, and place  Attention Span and Concentration:  limited  Recent and Remote Memory:  limited         Precautions:     Behavioral Orders   Procedures    Assault precautions    Cheeking Precautions (behavioral units)    Code 1 - Restrict to Unit    Elopement precautions    Routine Programming     As clinically indicated    Status 15     Every 15 minutes.    Status Individual Observation     Patient SIO status reviewed with team/RN.  Please also refer to RN/team documentation for add'l detail.    -SIO staff to monitor following which have contributed to patient being on SIO:  Confusion, disorientation resulting in agitation, assault risk  -Possible interventions SIO staff could use to support patient's treatment progress:  Provide reassurance and reorientation  -When following observed, team will review discontinuation of SIO:     Order Specific Question:   CONTINUOUS 24 hours / day     Answer:   Other     Order Specific Question:   Specify distance     Answer:   10     Order Specific Question:   Indications for SIO     Answer:   Assault risk     Order Specific Question:   Indications for SIO     Answer:   Severe intrusiveness    Suicide precautions: Suicide Risk: MODERATE; Clinical rationale to override score: modification to the care environment     Patients on Suicide Precautions should have a Combination Diet ordered that includes a Diet selection(s) AND a Behavioral Tray selection for Safe Tray - with utensils, or Safe Tray - NO utensils       Order Specific Question:   Suicide Risk     Answer:   MODERATE     Order  Specific Question:   Clinical rationale to override score:     Answer:   modification to the care environment          DIagnoses:   Bipolar affective disorder, current current episode mixed, severe, with psychosis  Breast cancer         Plan:   Medications:  -- Discontinue lithium due to hypothyroidism thyroidism poor fluid intake  -- Discontinue Seroquel, is not helpful  -- Start Zyprexa, 2.5 mg every morning and 5 mg at bedtime  -- May start Prozac to target the depression and anxiety  -- Additional medications will include hydroxyzine, Zyprexa, trazodone    Medical:  --Internal medicine to follow up for medical problems     --Lab work was reviewed.  Abnormal results include glucose 100, TSH 29.64, T4 0.68.  Pregnancy test was negative.    --Initial EKG shows QTc of 601.  Subsequent EKG shows a normal QTc.      Others:  --Intake and output  --Calorie counts  --Boost, chocolate flavor    --SIO for confusion and psychosis    --Care was coordinated with the treatment team.   --The patient was consulted on nature of illness and treatment options.      Disposition Plan   Reason for ongoing admission: is unable to care for self due to severe psychosis or manuela  Discharge location: home with family  Discharge Medications: not ordered  Follow-up Appointments: not scheduled  Legal Status:     Initially on 72 hours hold.  Petition was started in the emergency room.  The petition was not supported by the court.  The appeal was also rejected as well.  The patient is now voluntary.  Discharge will be granted once symptoms improved.    Radha CLAUDIO, CNP    This note was created with the help of Dragon dictation system. All grammatical/typing errors or context distortion are unintentional and inherent to software.

## 2024-08-08 NOTE — PROGRESS NOTES
RN writer was on SIO 1:1 with pt when she appeared to become weak in the knees and slowly fall toward the ground between her  and sister and in front of NP.  Pt was standing next to table in the dining area listening to provider talk to her visitors when the incident took place.  No events lead to this incident.  RN writer took pt vitals and they were stable as well as BG reading of 111.  Pt declined wanting any interventions (snacks, drinks), declined being light headed, just that she was feeling weak in the legs.  RN writer reported incident and findings to pt's assigned RN.

## 2024-08-08 NOTE — PLAN OF CARE
"RN Assessment    SI/SIB/HI/AVH:Pt denies, pt endorsed some anxiety this morning per provider and was given PRN hydroxyzine.  Thought process:Pt is disorganized, confused, guarded, and seemingly thought blocking.  Sleep:Pt slept for 5.75 hours recorded on NOC and rested for a while this morning  Affect & Mood:Pt affect is tearful, withdrawn, and irritable at times. She is soft spoken and often hesitant to respond.    Behavior:Pt continues to utilize weighted blanket as a calming intervention. Writer approached pt with her morning medications in their packages and she took them with no issue. Writer brought medications in their packages d/t paranoia yesterday about not seeing medication packages. Pt told writer \"I want to leave, so I'm just going to rest\". The court did not support the petition or appeal, but pt's  thought she should stay and came to discuss staying for further treatment. Pt was observed in the dining room around 1040 tearful, drinking water, and stating to her SIO staff that she was in pain in her LLQ. Pt thought it was her appendix. Pt was informed that the LLQ is not where her appendix is located and that this pain could be constipation. Pt would not respond about when her last BM was and asked writer/preceptor for \"a break, I can't think with all of these questions\". Pt was encouraged to continue hydrating as this is important while she's taking lithium and was told it could also be helpful for constipation if that is the case. Pt was given a water pitcher to refill her cup.  and sister came around 1130 and discussed her current state at length. Pt did have an episode of feeling faint this afternoon, see other RN note for more information. Pt is now on fall precautions. While visitors were here pt tried to walk out of the unit doors, so she is also now on elopement precautions. Pt signed in voluntary around 1240. Pt moved rooms from 119 to 107 d/t persistent headaches and her " previous room 119 being too close to the desk and loud. Pt was observed laying calm in her room this afternoon. Pt requested PRN tylenol and another ice pack for headache.    Pt is compliant with medications    Possible Medication SE:None reported or observed   Hygiene: adequate   VS:VSS, BP slightly elevated (152/85)  Pain: Pt endorsed headaches throughout the shift and was given PRN tylenol and ice packs. Pt also endorsed LLQ pain.   Medical Concerns: Potential constipation   Appetite & Fluid intake: poor, pt only ate half a banana for breakfast and didn't touch much of her lunch. Provider ordered calorie counts and fluid intake to be noted in her chart. Pt ate a cheese stick this afternoon.  Bowel & Bladder: In regards to LLQ pain, constipation was discussed. Pt was unwilling to participate in conversation regarding recent BM and was only open to placing a heat pack on her stomach as an intervention to relieve symptoms. Pt was encouraged to keep drinking water and stay hydrated.       PRN medications utilized this shift include:  Tylenol 650 mg for headache this morning and afternoon  Hydroxyzine 25 mg for anxiety

## 2024-08-08 NOTE — PLAN OF CARE
Goal Outcome Evaluation:       Pt was awake sitting by the window at the beginning of the shift.Pt came to the jansen and curled herself on the floor.Writer did approach the  patient if she can sit up from the floor and talk but pt shook her head. Pt looked teary and anxious.Pt got up and started running around the dining room area.Pt was easily redirected.Pt slept for 5.75 hours.She remains on SIO for severe intrusiveness.PRN traZODone and hydrOXYzine was given.Will continue to monitor.    PRN: traZODone         hydrOXYzine

## 2024-08-08 NOTE — PLAN OF CARE
BEH IP Unit Acuity Rating Score (UARS)  Patient is given one point for every criteria they meet.    CRITERIA SCORING   On a 72 hour hold, court hold, committed, stay of commitment, or revocation. 1    Patient LOS on BEH unit exceeds 20 days. 0  LOS: 2   Patient under guardianship, 55+, otherwise medically complex, or under age 11. 1   Suicide ideation without relief of precipitating factors. 0   Current plan for suicide. 0   Current plan for homicide. 0   Imminent risk or actual attempt to seriously harm another without relief of factors precipitating the attempt. 0   Severe dysfunction in daily living (ex: complete neglect for self care, extreme disruption in vegetative function, extreme deterioration in social interactions). 1   Recent (last 7 days) or current physical aggression in the ED or on unit. 0   Restraints or seclusion episode in past 72 hours. 0   Recent (last 7 days) or current verbal aggression, agitation, yelling, etc., while in the ED or unit. 1   Active psychosis. 0   Need for constant or near constant redirection (from leaving, from others, etc).  1   Intrusive or disruptive behaviors. 0   Patient requires 3 or more hours of individualized nursing care per 8-hour shift (i.e. for ADLs, meds, therapeutic interventions). 1   TOTAL 6

## 2024-08-08 NOTE — PLAN OF CARE
"Preferences: she/her pronouns, goes by \"Fiorella\"      needs: No     Team Meeting: Varies based on provider availability   Attending Provider: SHANON Farias CNP  Voicemail Code: See desk phone  Team Note Due: Wednesday  Next Steps: N/A     Assessment/Intervention/Current Symtoms and Care Coordination:  -Chart review  -Writer received call from Lani at [facility] Attorney's Office (phone: [phone]) who states they received the report from PPS and the notice of appeal, however they're unable to find enough evidence/indication of harm. Writer agreed to pass information on to provider and follow up with questions as needed.   -Update shared with provider regarding decision of non-support for the petition from both [facility] PPS and 's Office. Provider plans to check-in with Fiorella to inquire whether she's willing to remain in the hospital voluntarily.   -Provider reports Fiorella is requesting to discharge. Medications will be sent to outside pharmacy.   -Writer called Daniel to provide update regarding non-support of petition and Fiorella requesting to discharge. He expressed disbelief and reports last night during visiting, Fiorella presented as worse than when she was brought to the ED. He described her as being in \"rough shape\" and appeared to be responding to internal stimuli. He notes she was unable to finish thoughts, stated they needed to speak in tongues so others wouldn't hear them, appeared paranoid, sleep deprived, and anxious. Daniel hopes to be able to convince Fiorella to stay and plans to come in to the unit within the hour to encourage her to remain hospitalized so meds can be adjusted further. Writer shared update regarding Seroquel and Lithium per admitting provider's note. Daniel was also advised to call [facility] and/or 911 if issues persist at home.   -[facility] therapist attempted to meet with Fiorella to complete safety plan. She appeared as if she was in distress noted " "by shaking, crying, and minimal verbal responses. Fiorella requested for CTC therapist to leave room - she'll plan to meet with Fiorella again later in the morning.   -Daniel arrived to unit to meet with Fiorella. He talked with her in the conference room for about 10 minutes. Her sister Selina also arrived to unit and they moved to the dining area. Writer joined conversation. Fiorella stated she is pregnant and needs to go home. Writer agreed to follow up with her RN as it sounded like a pregnancy test was completed yesterday. Fiorella is complaining of abdomen pain and a headache, though headache seems to have improved since yesterday. Fiorella stood up and left the conversation without saying anything when we started to talk about encouraging her to remain in the hospital until her sleep and confusion are improved.  provided update that she seems to be slightly more organized that yesterday evening, but is still not like herself. He's concerned if she leaves that she will end up coming right back to the hospital and feel even more triggered by having to wait for a bed while in the ED. Fiorella returned to the table and stated \"I need to go to the hospital\". Writer explained she's in the hospital currently, and that psychiatry unit looks different from medical units typically do. She notes she needs to see a doctor and writer reassured her she has been seeing providers, including internal medicine. Fiorella appeared tearful but seemed reassured with support of her  and sister. Writer provided update on decision from [facility] regarding non-support of petition and Fiorella seemed understanding. She also seemed receptive to conversation about wanting to minimize likelihood of returning to the hospital by staying until she's more stable. Fiorella stated she was agreeable to staying. She notes ongoing concern for noise and writer voiced plan to connect with nursing about possible room change further from the nursing station which she was " appreciative of. No other questions/concerns expressed at this time.  -Fiorella agreed to sign voluntary consent forms. SIO recommended elopement precautions be added if not already in place due to concern for Fiorella darting toward exit doors.   -Charge RN notes Fiorella will be moved to room 107 - SIO states Fiorella nearly entered peer with same name's room as they are nearby one another.  Current Symptoms include the following: anxious, tearful, paranoia  Precautions: Assault and Cheeking     Discharge Plan or Goal:  Pending stabilization & development of a safe discharge plan.  Considerations include: Return home with outpatient providers     Discharge Checklist:  Transportation:  will transport home  Medications ordered/sent to: Yes: [facility] ([facility] in [place])  Restricted recipient: No  Provisional discharge required: No  Trina safety plan completed: No - attempted by CTC therapist on 8/8/2024.  Appointments scheduled:   Encouraged to follow up with existing providers.   AVS complete: No     Barriers to Discharge:  Fiorella presents following increase in racing thoughts, insomnia, and concern for manuela and confusion. She requires symptom stabilization, medication management, and supportive discharge plan.      Referral Status:  No new referrals     Legal Status:  Fiorella is voluntary (as of 8/8/2024)     Contacts:  Family/Friends:  Spouse - Daniel Nicholson (phone: [phone]) - LAWANDA obtained 8/4/2024  Sister - Selina Bartlett (phone: [phone])     Outpatient Providers:  Primary Care Provider - Marilyn Caruso PA-C of [facility] (phone: [phone])  [facility] Pre-Petition Screener - Ezequiel West (phone: [phone], email: [email or web address].)?     Upcoming Meetings/Important Dates:  Court (commitment/guardianship,etc.):  TBD     Interview/assessment/care conference:  N/A     Aftercare/outpatient appointments:  N/A     Rationale for SIO/No Roommate  Order:  Fiorella is on 1:1 SIO due to assault risk.  Fiorella is in single room.   (Single room  was started on Station 10 on 5/20/2024).

## 2024-08-08 NOTE — PLAN OF CARE
"  Problem: Psychotic Signs/Symptoms  Goal: Improved Behavioral Control (Psychotic Signs/Symptoms)  Outcome: Not Progressing  Pt continues to be on SIO 1:1 for confusion, disorientation.This shift, pt continues to present as labile,mistrustful, disorganized, loud, requiring multiple redirections. Pt walked into another pt's room (room 121) SIO staff redirected pt immediately. A tag with pt's name placed on pt's door.No incidents.  Pt reported headache at the beginning of the shift, requested for  cold pack and Tylenol, writer brought open tylenol to pt's room, pt is mistrustful, declined medication, requested for meds to be brought to her in the original package, writer discarded medications, brought Tylenol in a pack for pt, pt was able to take medication.   Urine sample collected with encouragement and sent to LAB, [facility] and [place] [facility], Dr. Murdock notified.  Pt took scheduled Senna and Lithium at bedtime. Declined vitamin D3.  At around 2100, pt came out of her room, stayed staring at the camera, yelling, making nonsensical statements, pt stated \"Leave me alone\" to staff trying to redirect pt. PRN Zyprexa offered by writer and another RN pt declined. Pt was able to self redirect after a while, went back to her room.   Pt asked for her bedtime Seroquel, writer notified pt, medication had been held by provider, PRN Trazodone offered for sleep, pt declined.  Pt in her room at the end of the shift, appeared calm, will continue to monitor.                           "

## 2024-08-09 ENCOUNTER — APPOINTMENT (OUTPATIENT)
Dept: GENERAL RADIOLOGY | Facility: CLINIC | Age: 39
End: 2024-08-09
Attending: PHYSICIAN ASSISTANT
Payer: COMMERCIAL

## 2024-08-09 LAB
ANION GAP SERPL CALCULATED.3IONS-SCNC: 15 MMOL/L (ref 7–15)
ATRIAL RATE - MUSE: 64 BPM
BUN SERPL-MCNC: 9.7 MG/DL (ref 6–20)
CALCIUM SERPL-MCNC: 9.9 MG/DL (ref 8.8–10.4)
CHLORIDE SERPL-SCNC: 102 MMOL/L (ref 98–107)
CREAT SERPL-MCNC: 0.88 MG/DL (ref 0.51–0.95)
DIASTOLIC BLOOD PRESSURE - MUSE: NORMAL MMHG
EGFRCR SERPLBLD CKD-EPI 2021: 86 ML/MIN/1.73M2
ERYTHROCYTE [DISTWIDTH] IN BLOOD BY AUTOMATED COUNT: 12 % (ref 10–15)
GLUCOSE SERPL-MCNC: 108 MG/DL (ref 70–99)
HCO3 SERPL-SCNC: 22 MMOL/L (ref 22–29)
HCT VFR BLD AUTO: 38 % (ref 35–47)
HGB BLD-MCNC: 13.3 G/DL (ref 11.7–15.7)
INTERPRETATION ECG - MUSE: NORMAL
MCH RBC QN AUTO: 30.6 PG (ref 26.5–33)
MCHC RBC AUTO-ENTMCNC: 35 G/DL (ref 31.5–36.5)
MCV RBC AUTO: 87 FL (ref 78–100)
P AXIS - MUSE: 39 DEGREES
PLATELET # BLD AUTO: 196 10E3/UL (ref 150–450)
POTASSIUM SERPL-SCNC: 3.6 MMOL/L (ref 3.4–5.3)
PR INTERVAL - MUSE: 170 MS
QRS DURATION - MUSE: 70 MS
QT - MUSE: 412 MS
QTC - MUSE: 425 MS
R AXIS - MUSE: 29 DEGREES
RBC # BLD AUTO: 4.35 10E6/UL (ref 3.8–5.2)
SODIUM SERPL-SCNC: 139 MMOL/L (ref 135–145)
SYSTOLIC BLOOD PRESSURE - MUSE: NORMAL MMHG
T AXIS - MUSE: 52 DEGREES
TROPONIN T SERPL HS-MCNC: 13 NG/L
TROPONIN T SERPL HS-MCNC: 16 NG/L
VENTRICULAR RATE- MUSE: 64 BPM
WBC # BLD AUTO: 6.2 10E3/UL (ref 4–11)

## 2024-08-09 PROCEDURE — 71045 X-RAY EXAM CHEST 1 VIEW: CPT

## 2024-08-09 PROCEDURE — 82374 ASSAY BLOOD CARBON DIOXIDE: CPT | Performed by: PHYSICIAN ASSISTANT

## 2024-08-09 PROCEDURE — 71045 X-RAY EXAM CHEST 1 VIEW: CPT | Mod: 26 | Performed by: RADIOLOGY

## 2024-08-09 PROCEDURE — 124N000002 HC R&B MH UMMC

## 2024-08-09 PROCEDURE — 250N000013 HC RX MED GY IP 250 OP 250 PS 637: Performed by: PHYSICIAN ASSISTANT

## 2024-08-09 PROCEDURE — 250N000013 HC RX MED GY IP 250 OP 250 PS 637: Performed by: PSYCHIATRY & NEUROLOGY

## 2024-08-09 PROCEDURE — 250N000013 HC RX MED GY IP 250 OP 250 PS 637: Performed by: NURSE PRACTITIONER

## 2024-08-09 PROCEDURE — 93005 ELECTROCARDIOGRAM TRACING: CPT

## 2024-08-09 PROCEDURE — 99232 SBSQ HOSP IP/OBS MODERATE 35: CPT | Performed by: NURSE PRACTITIONER

## 2024-08-09 PROCEDURE — 84484 ASSAY OF TROPONIN QUANT: CPT | Performed by: PHYSICIAN ASSISTANT

## 2024-08-09 PROCEDURE — 93010 ELECTROCARDIOGRAM REPORT: CPT | Performed by: INTERNAL MEDICINE

## 2024-08-09 PROCEDURE — 85027 COMPLETE CBC AUTOMATED: CPT | Performed by: PHYSICIAN ASSISTANT

## 2024-08-09 PROCEDURE — 36415 COLL VENOUS BLD VENIPUNCTURE: CPT | Performed by: PHYSICIAN ASSISTANT

## 2024-08-09 RX ORDER — BISACODYL 10 MG
10 SUPPOSITORY, RECTAL RECTAL DAILY PRN
Status: DISCONTINUED | OUTPATIENT
Start: 2024-08-09 | End: 2024-08-14 | Stop reason: HOSPADM

## 2024-08-09 RX ORDER — OLANZAPINE 10 MG/1
10 TABLET ORAL AT BEDTIME
Status: DISCONTINUED | OUTPATIENT
Start: 2024-08-09 | End: 2024-08-10

## 2024-08-09 RX ORDER — POLYETHYLENE GLYCOL 3350 17 G/17G
17 POWDER, FOR SOLUTION ORAL DAILY
Status: DISCONTINUED | OUTPATIENT
Start: 2024-08-09 | End: 2024-08-09

## 2024-08-09 RX ORDER — POLYETHYLENE GLYCOL 3350 17 G/17G
17 POWDER, FOR SOLUTION ORAL DAILY PRN
Status: DISCONTINUED | OUTPATIENT
Start: 2024-08-09 | End: 2024-08-14 | Stop reason: HOSPADM

## 2024-08-09 RX ORDER — SENNOSIDES 8.6 MG
8.6 TABLET ORAL 2 TIMES DAILY
Status: DISCONTINUED | OUTPATIENT
Start: 2024-08-09 | End: 2024-08-14 | Stop reason: HOSPADM

## 2024-08-09 RX ORDER — OLANZAPINE 5 MG/1
5 TABLET ORAL EVERY MORNING
Status: DISCONTINUED | OUTPATIENT
Start: 2024-08-09 | End: 2024-08-12

## 2024-08-09 RX ADMIN — HYDROXYZINE HYDROCHLORIDE 25 MG: 25 TABLET, FILM COATED ORAL at 16:26

## 2024-08-09 RX ADMIN — OLANZAPINE 5 MG: 5 TABLET, FILM COATED ORAL at 07:55

## 2024-08-09 RX ADMIN — TRAZODONE HYDROCHLORIDE 50 MG: 50 TABLET ORAL at 01:28

## 2024-08-09 RX ADMIN — SENNOSIDES 8.6 MG: 8.6 TABLET, FILM COATED ORAL at 20:53

## 2024-08-09 RX ADMIN — HYDROXYZINE HYDROCHLORIDE 25 MG: 25 TABLET, FILM COATED ORAL at 20:53

## 2024-08-09 RX ADMIN — LEVOTHYROXINE SODIUM 112 MCG: 112 TABLET ORAL at 07:55

## 2024-08-09 RX ADMIN — HYDROXYZINE HYDROCHLORIDE 25 MG: 25 TABLET, FILM COATED ORAL at 01:28

## 2024-08-09 RX ADMIN — OLANZAPINE 10 MG: 10 TABLET, FILM COATED ORAL at 20:53

## 2024-08-09 RX ADMIN — OLANZAPINE 5 MG: 5 TABLET, FILM COATED ORAL at 12:29

## 2024-08-09 RX ADMIN — SENNOSIDES 8.6 MG: 8.6 TABLET, FILM COATED ORAL at 08:23

## 2024-08-09 RX ADMIN — TRAZODONE HYDROCHLORIDE 50 MG: 50 TABLET ORAL at 20:53

## 2024-08-09 RX ADMIN — Medication 25 MCG: at 07:55

## 2024-08-09 ASSESSMENT — ACTIVITIES OF DAILY LIVING (ADL)
ADLS_ACUITY_SCORE: 45
LAUNDRY: WITH SUPERVISION
ADLS_ACUITY_SCORE: 45
ADLS_ACUITY_SCORE: 65
HYGIENE/GROOMING: INDEPENDENT
ADLS_ACUITY_SCORE: 45
ADLS_ACUITY_SCORE: 65
LAUNDRY: WITH SUPERVISION
DRESS: SCRUBS (BEHAVIORAL HEALTH)
ADLS_ACUITY_SCORE: 65
ORAL_HYGIENE: INDEPENDENT;WITH SUPERVISION
ADLS_ACUITY_SCORE: 65
ADLS_ACUITY_SCORE: 45
ADLS_ACUITY_SCORE: 65
ADLS_ACUITY_SCORE: 45
DRESS: SCRUBS (BEHAVIORAL HEALTH);INDEPENDENT;WITH SUPERVISION
ADLS_ACUITY_SCORE: 65
ADLS_ACUITY_SCORE: 45
HYGIENE/GROOMING: INDEPENDENT;WITH SUPERVISION
ORAL_HYGIENE: INDEPENDENT
ADLS_ACUITY_SCORE: 45

## 2024-08-09 NOTE — PLAN OF CARE
RN: Pt C/O constipation, PRN MOM given as ordered. Pt was unable to sleep, ran out of room around 0130, PRN Atarax and Trazodone given as ordered. Pt slept for 2.5 hrs after and than remained awake for rest of the shift. Pt Continue with POC.    Problem: Anxiety Signs/Symptoms  Goal: Improved Mood Symptoms (Anxiety Signs/Symptoms)  Intervention: Optimize Emotion and Mood  Recent Flowsheet Documentation  Taken 8/8/2024 1600 by Rosy Boswell, RN  Supportive Measures: active listening utilized   Goal Outcome Evaluation:    Plan of Care Reviewed With: patient

## 2024-08-09 NOTE — PROGRESS NOTES
"At beginning of EVES shift pt was waiting by the pod dors asking if her  was here. Staff informed her that he was not. Pt was fixated on where her  was and was adamant that he was coming. Staff informed her that if he was coming he would have to call the unit to let us know so we could let him in, but at this time he was not here. Pt said \"may god strike you dead\".  "

## 2024-08-09 NOTE — PROGRESS NOTES
Brief Medicine Note:    Complaining of new chest pain that started a few minutes ago. Has been holding her chest. Describes it as pressure. Denies any shortness of breath or reflux symptoms. Per nursing, had recently attempted to elope from the unit at shift change just preceding this new episode of chest pain. Vitals stable (/71, , O2 97%). Does not appear to have any cardiac history per chart review.    Ordered stat EKG, troponin, CBC, CMP, CXR. EKG without ischemic changes - compared to her EKG obtained on 8/8 and she did have some mild T-wave inversions in V4, V5, V6 which have since resolved.  BMP, CBC unremarkable. Troponin 16. CXR with no acute cardiopulmonary findings.    Overall troponin minimally elevated and unlikely ACS however will repeat for delta with an EKG as well at that time.    Emilie Villagran PA-C  Internal Medicine [facility] Hospitalist  [facility]    Addendum: Repeat  troponin 13. Unclear etiology of her chest pain earlier but unlikely ACS or other emergent cardiopulmonary process at this time. If ongoing - could consider IM consult for evaluation tomorrow.

## 2024-08-09 NOTE — PLAN OF CARE
BEH IP Unit Acuity Rating Score (UARS)  Patient is given one point for every criteria they meet.    CRITERIA SCORING   On a 72 hour hold, court hold, committed, stay of commitment, or revocation. 0   Patient LOS on BEH unit exceeds 20 days. 0  LOS: 3   Patient under guardianship, 55+, otherwise medically complex, or under age 11. 1   Suicide ideation without relief of precipitating factors. 0   Current plan for suicide. 0   Current plan for homicide. 0   Imminent risk or actual attempt to seriously harm another without relief of factors precipitating the attempt. 0   Severe dysfunction in daily living (ex: complete neglect for self care, extreme disruption in vegetative function, extreme deterioration in social interactions). 1   Recent (last 7 days) or current physical aggression in the ED or on unit. 0   Restraints or seclusion episode in past 72 hours. 0   Recent (last 7 days) or current verbal aggression, agitation, yelling, etc., while in the ED or unit. 1   Active psychosis. 1   Need for constant or near constant redirection (from leaving, from others, etc).  1   Intrusive or disruptive behaviors. 0   Patient requires 3 or more hours of individualized nursing care per 8-hour shift (i.e. for ADLs, meds, therapeutic interventions). 1   TOTAL 6

## 2024-08-09 NOTE — PROGRESS NOTES
CLINICAL NUTRITION SERVICES - ASSESSMENT NOTE     Nutrition Prescription    RECOMMENDATIONS FOR MDs/PROVIDERS TO ORDER:  Please note RDN does not complete calorie counts on mental health units unless seriously considering nutrition support. If seriously considering nutrition support, please re-consult for calorie counts. Thanks!    Malnutrition Status:    Patient does not meet two of the established criteria necessary for diagnosing malnutrition but is at risk for malnutrition    Recommendations already ordered by Registered Dietitian (RD):  -Patient care order: record intakes  -Ensure Enlive chocolate w/ meals  -Gatorade w/ meals    Future/Additional Recommendations:  Monitor PO intake, GI sx, wt trends.      REASON FOR ASSESSMENT  Fiorella Bartlett is a/an 38 year old female assessed by the dietitian for Provider Order - calorie counts    CLINICAL HISTORY  PMH significant for depression, bipolar I disorder, sleep deprivation, eating disorder and brief reactive psychosis, kidney stones, and triple negative breast cancer. Admitted from ED 8/6/24 due to concern for manuela in the context of hypothyroidism, subtherapeutic dose of lithium, dose reductions in psychotropic medications, chemotherapy and recent return to work.     NUTRITION HISTORY  RD spoke with Fiorella over the phone who reports not having a formal dx ED but dealing with binging in the past, now having difficulty eating d/t poor appetite 2/2 depression. She is eating 25-50% of meals but at home eats 3 full meals per day. Pt states she has been feeling lightheaded and racing HR since admission. Pt is agreeable to Gatorade and Ensure w/ meals to encourage intake and hydration.     GI: constipation - improving per pt, LBM this morning    CURRENT NUTRITION ORDERS  Diet: Regular  Supplement: Ensure Enlive w/ meals  Intake/Tolerance:   8/8: <25% intake of breakfast/lunch, 40% dinner    LABS  T4 0.68L  TSH 29.64H - medicine following for  "hypothyroidism    MEDICATIONS  Zyprexa, senokot, vit D3, atarax prn, MOM prn    ANTHROPOMETRICS  Height: 162.6 cm (5' 4\")  Most Recent Weight: 63.5 kg (140 lb)    IBW: 54.5 kg   BMI: Normal BMI  Weight History:   Wt Readings from Last 15 Encounters:   08/06/24 63.5 kg (140 lb)   07/30/24 62.1 kg (137 lb)      07/03/24 65.4 kg (144 lb 2.9 oz)      05/13/24 65.8 kg (145 lb)      08/29/21 66.1 kg (145 lb 11.2 oz)   08/20/21 65 kg (143 lb 4.8 oz)   04/09/19 64.6 kg (142 lb 6.4 oz)   03/23/19 54.4 kg (120 lb)   02/05/19 64.4 kg (142 lb)   No significant wt loss noted  UBW: 140-145 lb per pt    Dosing Weight: 63 kg (actual)    ASSESSED NUTRITION NEEDS  Estimated Energy Needs: 1575 - 1890 kcals/day (25 - 30 kcals/kg)  Justification: Maintenance  Estimated Protein Needs: 50 - 63 grams protein/day (0.8 - 1 grams of pro/kg)  Justification: Maintenance  Estimated Fluid Needs: 1 mL/kcal   Justification: Maintenance or Per Provider    PHYSICAL FINDINGS  See malnutrition section below.  Nickolas: 21    MALNUTRITION  % Intake: Decreased intake does not meet criteria  % Weight Loss: None noted  Subcutaneous Fat Loss: Unable to assess - RD offsite  Muscle Loss: Unable to assess - RD offsite  Fluid Accumulation/Edema: Unable to assess - RD offsite  Malnutrition Diagnosis: Patient does not meet two of the established criteria necessary for diagnosing malnutrition but is at risk for malnutrition    NUTRITION DIAGNOSIS  Inadequate oral intake related to poor appetite as evidenced by 25-50% intake of meals since admission.      INTERVENTIONS  Implementation  Nutrition Education: RD role in care   Collaboration with other providers  Medical food supplement therapy  Modify composition of meals/snacks     Goals  Patient to consume % of nutritionally adequate meal trays TID, or the equivalent with supplements/snacks.     Monitoring/Evaluation  Progress toward goals will be monitored and evaluated per protocol.    Kerry Lyn, MPH, RDN, " [facility]  Behavioral Health Adult & Pediatric Dietitian  BEH Clinical Dietitian Maureen Rajan, or Desk: [phone]  Weekend/Holiday Mohini: Weekend Holiday Clinical Dietitian [Multi Site Groups]

## 2024-08-09 NOTE — PLAN OF CARE
"Writer took over nursing cares after 1630.  She complained of chest pain to her former assigned RN, who updated internal medicine.  Stat labs, chest xray, and EKG was ordered and completed before 1700.  She stated that the pain is in the mid chest area only.  She denied other pain/discomfort when asked by writer.  States, tearfully, to writer that she does gets, \" images,\" that her bothersome to her, yet does not elaborate further.  She denies ideas of harming herself and others when asked.  She is delayed in speech, cognition and displays confused, disorganized thought process throughout the shift.  She lowers herself to the floor and requires extensive support, redirection to rest on the a bed, chair, and to get off the floor for safety concerns throughout the shift.  PRN atarax was given in order to help reduce her agitation.  Writer placed limb alert on her left arm due to not being able to have lab draws or vital signs taken from that arm in the shift.  She urinated on the floor and with staff support she changed her clothes.  Informed future sitter staff to reminder her to use the bathroom.  Writer updated internal medicine on lab values, EKG, and chest XRAY results.  Writer also updated internal medicine of her lowering herself on purpose from the chair to the floor, getting on her knees, not hitting her head at the end of the morning shift.  Internal medicine was updated on troponin level.  Per sitter aide, she was walking towards the door and fell onto her bilateral knees, she did not hit her head as this was witnessed.  She has full range of motion in her extremities, states, \" my legs gave out,\" writer will obtain a gait belt to use a gait belt to help provider additional support to her.  She denies feeling dizzy or light headed when asked, able to ambulate, transfer without difficulty.  She does have some light pinkish, red coloration on her bilateral knees.  Instructed by internal medicine to continue " to assess and contact internal medicine if needed.   was updated on patient findings in morning and evening shifts, and stated that she has a history of falling, fainting when hospitalized.   stated that she told him that she doesn't want to live anymore, but then later said that she wanted to live.  PRN atarax, trazodone with hs medications that were given with extensive support, encouragement from writer.   Labile in mood and goes from crawling on the floor, crying, to sitting still and interacting with visitors in the shift.  She continues to require SIO 1:1 due to disorganized thought process, intrusiveness, and elopement risk.  SIO order was changed to five feet as well.   On call psychiatrist notified of findings as well.

## 2024-08-09 NOTE — PLAN OF CARE
"RN: Pt is alert, disoriented to situation. Pt is very disorganized, VSS. Pt didn't attend group this shift. Pt presented as flat/guarded/restricted affect, mood was anxious and depressed. Pt was in the milieu doing art work or watching TV intermittently, but was socially withdrawn to self. Pt endorsed anxiety earlier during this shift, PRN Atarax given with good effect. Pt continued to have bilateral LE weakness, fall precaution wrist band applied. Pt is medication compliant, no side effects reported or observed. Pt ate about 40% of meal for dinner. Pt reports constipation, fluid encouraged. MOM given by other RN staff, pt spit it out, Stated: \"it tasted bad\". Prune juice offered. Pt took a shower this shift. Pt's family ( and sister) came to visit, pt had good time with them during the visit hour. Continue to be on SIO for safety.         Goal Outcome Evaluation:    Plan of Care Reviewed With: patient                   "

## 2024-08-09 NOTE — PLAN OF CARE
"  Problem: Psychotic Signs/Symptoms  Goal: Optimal Cognitive Function (Psychotic Signs/Symptoms)  Outcome: Progressing   Goal Outcome Evaluation:    RN Assessment:  SI/Self harm:  pt denies, stating \"not today\"  Aggression/agitation/HI:  none reported or observed  AVH:  pt reports \"visual disturbances\", and is \"uncertain\" if she's experiencing auditory hallucinations. Pt would not elaborate on the content of the VH.  Sleep: per NOC shift pt only slept 2.25 hours. Pt corroborates that her sleep has been poor  PRN Med: olanzapine 5 mg PO for agitation, restless, loudly yelling, pacing  Medication AE: none reported or observed  Physical Complaints/Issues: pt reported constipation this morning, later reported having bowel movement and feeling relief.   I & O: eating and drinking well  LBM: this morning  ADLs: independent  Visits: none this shift  Vitals:  WNL  COVID 19 Assessment:  no symptoms present  Milieu Participation: minimal, pt pacing the jansen, sits in the milieu for brief periods but keeps to self, prefers not to engage socially. Pt attempted group but left after a brief moment \"because I was having disturbing visions\"  Behavior: agitated, labile, tearful one moment then chanting \"sending love to (name of a person)\" repeatedly. Pt was difficult to redirect at this point. Pt accepted PRN olanzapine. Pt was unable to sit down and eat her lunch due to this agitation.  Pt remains on SIO for safety due to disorganization and impulsive behavior.   Pt's  Daniel called for an update (LAWANDA signed), which was given.   No other concerns at this time. Nursing will continue to monitor and assess.     At appx 1445 staff alerted RN writer that pt \"fell\" in the lounge. Writer went to pt to assess. VS WNL. Pt's SIO staff stated that pt was sitting in a chair and slid off the chair and onto her knees. Pt's head never hit the floor. Writer attempted to assess for any injury to pt's knees, and pt refused. Writer attempted to " "ask further assessment questions, and pt refused to engage with writer, stating \"I don't want any more questions, I'm self-soothing\". Pt was argumentative and uncooperative with post-fall assessment.   Pt remains on SIO for safety.     At appx 1515 pt ran through the first set of double doors, was quickly brought back into the unit by staff. Pt stated she is \"trying to balance out the energy\".   Evening shift staff aware. Nursing will continue to monitor and assess.    "

## 2024-08-09 NOTE — PLAN OF CARE
"Preferences: she/her pronouns, goes by \"Fiorella\"      needs: No     Team Meeting: Varies based on provider availability   Attending Provider: SHANON Farias CNP  Voicemail Code: See desk phone  Team Note Due: Wednesday  Next Steps:   Ensure aftercare supports are in place.     Assessment/Intervention/Current Symtoms and Care Coordination:  -Chart review  -Team meeting - provider increased dose of Zyprexa. Fiorella endorsed AH yesterday evening but is so far denying voices today. She is oriented but seems to lack insight into reason for being hospitalized. Fiorella is continued to be observed crying. She is agreeable to staying in the hospital through the weekend.   -Fiorella appears disorganized and paranoid and asked SIO not to follow her as she walked in the jansen and says she's okay. Seemed to lack understanding for why SIO is in place.   Current Symptoms include the following: Psychosis, anxious, tearful, paranoia  Precautions: Assault and Cheeking     Discharge Plan or Goal:  Pending stabilization & development of a safe discharge plan.  Considerations include: Return home with outpatient providers     Discharge Checklist:  Transportation:  will transport home  Medications ordered/sent to: Yes: [facility] ([facility] in [place])  Restricted recipient: No  Provisional discharge required: No  [facility] safety plan completed: No - attempted by [facility] therapist on 8/8/2024.  Appointments scheduled:   Encouraged to follow up with existing providers.   AVS complete: No     Barriers to Discharge:  Fiorella presents following increase in racing thoughts, insomnia, and concern for manuela and confusion. She requires symptom stabilization, medication management, and supportive discharge plan.      Referral Status:  No new referrals     Legal Status:  Fiorella is voluntary (as of 8/8/2024)     Contacts:  Family/Friends:  Spouse - Daniel Collinsholger (phone: [phone]) - LAWANDA obtained " 8/4/2024  Sister - Selina Bartlett (phone: [phone])     Outpatient Providers:  Primary Care Provider - Marilyn Caruso PA-C of [facility] (phone: [phone])  [facility] Pre-Petition Screener - Ezequiel West (phone: [phone], email: [email or web address].)?     Upcoming Meetings/Important Dates:  Court (commitment/guardianship,etc.):  TBD     Interview/assessment/care conference:  N/A     Aftercare/outpatient appointments:  N/A     Rationale for SIO/No Roommate Order:  Fiorella is on 1:1 SIO due to assault risk.  Fiorella is in single room.   (Single room  was started on [facility] on 5/20/2024).

## 2024-08-09 NOTE — PLAN OF CARE
Problem: Anxiety Signs/Symptoms  Goal: Improved Mood Symptoms (Anxiety Signs/Symptoms)  Intervention: Optimize Emotion and Mood  Recent Flowsheet Documentation  Taken 8/8/2024 1600 by Rosy Boswell RN  Supportive Measures: active listening utilized     Problem: Suicide Risk  Goal: Absence of Self-Harm  Intervention: Promote Psychosocial Wellbeing  Recent Flowsheet Documentation  Taken 8/8/2024 1600 by Rosy Boswell, RN  Supportive Measures: active listening utilized     Goal Outcome Evaluation:    Plan of Care Reviewed With: patient

## 2024-08-10 LAB
HOLD SPECIMEN: NORMAL
TROPONIN T SERPL HS-MCNC: 12 NG/L

## 2024-08-10 PROCEDURE — 124N000002 HC R&B MH UMMC

## 2024-08-10 PROCEDURE — 93010 ELECTROCARDIOGRAM REPORT: CPT | Performed by: INTERNAL MEDICINE

## 2024-08-10 PROCEDURE — 250N000013 HC RX MED GY IP 250 OP 250 PS 637: Performed by: NURSE PRACTITIONER

## 2024-08-10 PROCEDURE — 84484 ASSAY OF TROPONIN QUANT: CPT | Performed by: PHYSICIAN ASSISTANT

## 2024-08-10 PROCEDURE — 93005 ELECTROCARDIOGRAM TRACING: CPT

## 2024-08-10 PROCEDURE — 99232 SBSQ HOSP IP/OBS MODERATE 35: CPT | Performed by: NURSE PRACTITIONER

## 2024-08-10 PROCEDURE — 36415 COLL VENOUS BLD VENIPUNCTURE: CPT | Performed by: PHYSICIAN ASSISTANT

## 2024-08-10 PROCEDURE — 250N000013 HC RX MED GY IP 250 OP 250 PS 637: Performed by: PHYSICIAN ASSISTANT

## 2024-08-10 RX ORDER — OLANZAPINE 15 MG/1
15 TABLET ORAL AT BEDTIME
Status: DISCONTINUED | OUTPATIENT
Start: 2024-08-10 | End: 2024-08-14

## 2024-08-10 RX ORDER — OXCARBAZEPINE 150 MG/1
150 TABLET, FILM COATED ORAL 2 TIMES DAILY
Status: DISCONTINUED | OUTPATIENT
Start: 2024-08-10 | End: 2024-08-14 | Stop reason: HOSPADM

## 2024-08-10 RX ORDER — QUETIAPINE FUMARATE 100 MG/1
100 TABLET, FILM COATED ORAL AT BEDTIME
Status: DISCONTINUED | OUTPATIENT
Start: 2024-08-10 | End: 2024-08-10

## 2024-08-10 RX ADMIN — TRAZODONE HYDROCHLORIDE 50 MG: 50 TABLET ORAL at 21:08

## 2024-08-10 RX ADMIN — ACETAMINOPHEN 650 MG: 325 TABLET, FILM COATED ORAL at 11:07

## 2024-08-10 RX ADMIN — OXCARBAZEPINE 150 MG: 150 TABLET, FILM COATED ORAL at 21:06

## 2024-08-10 RX ADMIN — SENNOSIDES 8.6 MG: 8.6 TABLET, FILM COATED ORAL at 21:07

## 2024-08-10 RX ADMIN — SENNOSIDES 8.6 MG: 8.6 TABLET, FILM COATED ORAL at 10:18

## 2024-08-10 RX ADMIN — Medication 25 MCG: at 10:18

## 2024-08-10 RX ADMIN — OLANZAPINE 5 MG: 5 TABLET, FILM COATED ORAL at 10:18

## 2024-08-10 RX ADMIN — OLANZAPINE 15 MG: 15 TABLET, FILM COATED ORAL at 21:07

## 2024-08-10 RX ADMIN — LEVOTHYROXINE SODIUM 112 MCG: 112 TABLET ORAL at 10:18

## 2024-08-10 RX ADMIN — OLANZAPINE 5 MG: 5 TABLET, FILM COATED ORAL at 00:38

## 2024-08-10 ASSESSMENT — ACTIVITIES OF DAILY LIVING (ADL)
DRESS: SCRUBS (BEHAVIORAL HEALTH)
ADLS_ACUITY_SCORE: 65
ORAL_HYGIENE: INDEPENDENT
ADLS_ACUITY_SCORE: 55
DRESS: SCRUBS (BEHAVIORAL HEALTH)
ADLS_ACUITY_SCORE: 55
ADLS_ACUITY_SCORE: 65
ADLS_ACUITY_SCORE: 55
ADLS_ACUITY_SCORE: 65
ADLS_ACUITY_SCORE: 65
ADLS_ACUITY_SCORE: 55
ADLS_ACUITY_SCORE: 65
ADLS_ACUITY_SCORE: 65
ORAL_HYGIENE: INDEPENDENT;WITH SUPERVISION
ADLS_ACUITY_SCORE: 65
ADLS_ACUITY_SCORE: 55
ADLS_ACUITY_SCORE: 55
HYGIENE/GROOMING: INDEPENDENT;WITH SUPERVISION
ADLS_ACUITY_SCORE: 65
ADLS_ACUITY_SCORE: 65
HYGIENE/GROOMING: INDEPENDENT;WITH SUPERVISION
LAUNDRY: UNABLE TO COMPLETE
ADLS_ACUITY_SCORE: 55
ADLS_ACUITY_SCORE: 65

## 2024-08-10 NOTE — PLAN OF CARE
"Pt has a flat affect with depressed mood and did not attend group. Pt had a good visit with family this evening. Pt rates anxiety at 5/10 and depression 0/10. Pt rates pain at 0/10. Pt reports no SI/HI/SIB and contracts for safety. Pt denies any hallucinations and not noted responding to any internal stimuli. Pt was medication compliant. Pt ate 25% of dinner, she ate broccoli, the pasta sauce and cheese off the chicken parm, and a cookie. Pt also drank the ensure. No reported or observed medication side effects. Pt was visible on unit watching TV. Pt is withdrawn to self and not social with any peers. Pt has a steady gait walking with staff. Pt continues on sio for safety and intrusiveness. At 2215 pt stated \"I feel like my HR is low\". /88 P 74 O2 sat 97%. Pt denies any sob or pain. Pt reports her anxiety is unchanged. Offer anti anxiety med but pt declined. Pt did accept an ice pack to try. Continue current POC.                          "

## 2024-08-10 NOTE — PLAN OF CARE
"Problem: Sleep Disturbance  Goal: Adequate Sleep/Rest         Patient was awake at the start of the shift. Patient reported insomnia, increased anxiety. At a point, patient lowered herself to floor. From chart review, patient already got prn Hydroxyzine and Trazodone at bedtime. Prn Zyprexa was given. Patient slept a total of 5 hours. At about 0545, patient was awake, came out to the hallway and laid on the floor. Patient then got up and tried to run. [facility] staff attempted to redirect patient but patient was not redirectable. Patient kept saying that she wants to die by running and injuring herself. Staff had to physically hold patient and put a gait belt on. Patient kept lowering herself to the floor. After several attempts and encouragement, patient finally agreed to walk with staff to her room. Patient kept saying repeatedly, \"I have ALS, my dad has ALS.\" Patient was mimicking attempt to fly. Writer offered prn Hydroxyzine, patient put medication in her mouth drank some water, then spit the pill in the cup. After several attempts to get patient to take her medication, patient refused. Patient finally fell asleep. Staff will continue plan of care.       "

## 2024-08-10 NOTE — PROGRESS NOTES
Brief Medicine Note:    Following up on repeat EKG and troponin ordered by medicine team today for reported chest pain. Trop negative. EKG without ischemic changes. Appears similar to EKG from 8/9.    Emilie Villagran PA-C  Internal Medicine [facility] Hospitalist  [facility]

## 2024-08-10 NOTE — PLAN OF CARE
"  Problem: Adult Behavioral Health Plan of Care  Goal: Plan of Care Review  Recent Flowsheet Documentation  Taken 8/10/2024 1159 by Mirna Hill RN  Patient Agreement with Plan of Care: agrees   Goal Outcome Evaluation:     Plan of Care Reviewed With: patient     RN Assessment:  SI/Self harm:  pt denies  Aggression/agitation/HI:  pt denies, does not show aggression or agitation  AVH:  denies today  Sleep: pt slept more than previous shifts  PRN Med: tylenol for headache pain  Medication AE: none reported or observed  Physical Complaints/Issues: pt reported headache, treated with tylenol PRN.   At appx 1300 pt reported \"heart racing\" and chest pain \"same as yesterday\". /64, P 71 SpO2 99%. Internal medicine paged, troponin and EKG ordered.   I & O: eating and drinking well. Per provider order, intake documented in flowsheet as 50% breakfast and 75% lunch consumed. Please continue to encourage PO fluid intake.  LBM: yesterday  ADLs: independent  Visits:  and sister visited this afternoon, which went well  Vitals:  WNL  COVID 19 Assessment:  no symptoms present  Milieu Participation: minimal, pt spent most of the shift resting in her room  Behavior:overall pt appears anxious, quiet/withdrawn, reports feeling \"tired\" today. Pt took a shower and has been eating well at meals.   Pt ambulates with a gait belt for safety due to recent falls.  Pt remains on SIO due to impulsive, disorganized behavior.   No other concerns at this time. Nursing will continue to monitor and assess.         "

## 2024-08-10 NOTE — PROGRESS NOTES
"[facility], [place]   Psychiatric Progress Note        Interim History:   Team meeting report: The patient's care was discussed with the treatment team during the daily team meeting and/or staff's chart notes were reviewed.  Staff reports the patient continues to be confused.  She reported visual and auditory hallucinations.  Unable to elaborate.  The patient has been labile, tearful, and difficult to redirect at times.  Patient unable to sit down and eat lunch due to agitation.  Remains on SIO.  In the afternoon, the patient \"fell\" in the lounge.  Did not hit her head.  The SIO staff reported that the patient was sitting in the chair and slid off the chair and onto her knees.  Patient declined to answer questions.  Was argumentative and uncooperative with post fall assessment.  The patient attempted to leave the unit.  She ran through the first set of double doors but was brought back by the staff.  She reported that she is trying to balance out her energy.  In the evening, she had another \"fall\" episode.  She reported that her legs gave out.  Denies feeling dizzy or lightheaded.  According to her , she has a history of falling, and fainting when hospitalized.  He reported the patient has made a suicide statement.  She is labile.  Sometimes crawl on the floor.  She is crying.  Slept 5 hours.    Met with patient.  She was sitting in her bed.  Medications were brought and put on the bed.  The patient fell forwards twice and hit her hand on the magazines.  When told that the medicines will be taking away.  The patient did not have any more episodes.  When asked why she is doing that, the patient stated that she has too much energy.  Cogwheeling test was performed and the patient was negative.  The patient was oriented to self, place, and date but not situation.  States she came for her mental health but was not able to elaborate.  Believes that that she might have been manic but she " is not sure.  Denied having auditory and visual hallucinations.  When confronted about her report of having hallucinations yesterday, the patient did not responded.  States she is eating well.  Affect continues to be flat.  She attempted to cry once but was able to contain her composure quickly.  The patient did not ask to discharge.  She feels depressed.  Denies suicidal thoughts.  Encouraged her to focus on nourishing her body with the food and fluids.      Discussed medications.  The patient is agreeable to try Trileptal for mood stabilization.           Medications:     Current Facility-Administered Medications   Medication Dose Route Frequency Provider Last Rate Last Admin    levothyroxine (SYNTHROID/LEVOTHROID) tablet 112 mcg  112 mcg Oral QAM AC Idalmis Jc PA   112 mcg at 08/10/24 1018    OLANZapine (zyPREXA) tablet 15 mg  15 mg Oral At Bedtime Radha Morin, APRN CNP        OLANZapine (zyPREXA) tablet 5 mg  5 mg Oral QAM Radha Morin APRN CNP   5 mg at 08/10/24 1018    sennosides (SENOKOT) tablet 8.6 mg  8.6 mg Oral BID YadiraevaRadha APRN CNP   8.6 mg at 08/10/24 1018    Vitamin D3 (CHOLECALCIFEROL) tablet 25 mcg  25 mcg Oral Daily Idalmis Jc PA   25 mcg at 08/10/24 1018            Allergies:     Allergies   Allergen Reactions    Pcn [Penicillins] Unknown     Has been told she was allergic since she was a child.             Labs:     Recent Results (from the past 672 hour(s))   Lithium level    Collection Time: 08/03/24  8:44 PM   Result Value Ref Range    Lithium 0.45 (L) 0.60 - 1.20 mmol/L   TSH with free T4 reflex    Collection Time: 08/03/24  8:44 PM   Result Value Ref Range    TSH 24.61 (H) 0.30 - 4.20 uIU/mL   T4 free    Collection Time: 08/03/24  8:44 PM   Result Value Ref Range    Free T4 0.82 (L) 0.90 - 1.70 ng/dL   TSH with free T4 reflex    Collection Time: 08/04/24  7:44 PM   Result Value Ref Range    TSH 29.64 (H) 0.30 - 4.20 uIU/mL   T4  free    Collection Time: 08/04/24  7:44 PM   Result Value Ref Range    Free T4 0.68 (L) 0.90 - 1.70 ng/dL   Comprehensive metabolic panel    Collection Time: 08/04/24  7:44 PM   Result Value Ref Range    Sodium 138 135 - 145 mmol/L    Potassium 4.0 3.4 - 5.3 mmol/L    Carbon Dioxide (CO2) 23 22 - 29 mmol/L    Anion Gap 11 7 - 15 mmol/L    Urea Nitrogen 11.7 6.0 - 20.0 mg/dL    Creatinine 0.79 0.51 - 0.95 mg/dL    GFR Estimate >90 >60 mL/min/1.73m2    Calcium 9.5 8.8 - 10.4 mg/dL    Chloride 104 98 - 107 mmol/L    Glucose 100 (H) 70 - 99 mg/dL    Alkaline Phosphatase 62 40 - 150 U/L    AST 21 0 - 45 U/L    ALT 18 0 - 50 U/L    Protein Total 6.6 6.4 - 8.3 g/dL    Albumin 4.5 3.5 - 5.2 g/dL    Bilirubin Total 0.2 <=1.2 mg/dL   CBC with platelets    Collection Time: 08/05/24  2:51 PM   Result Value Ref Range    WBC Count 6.0 4.0 - 11.0 10e3/uL    RBC Count 4.29 3.80 - 5.20 10e6/uL    Hemoglobin 13.3 11.7 - 15.7 g/dL    Hematocrit 38.8 35.0 - 47.0 %    MCV 90 78 - 100 fL    MCH 31.0 26.5 - 33.0 pg    MCHC 34.3 31.5 - 36.5 g/dL    RDW 11.9 10.0 - 15.0 %    Platelet Count 214 150 - 450 10e3/uL   Asymptomatic COVID-19 Virus (Coronavirus) by PCR Nasopharyngeal    Collection Time: 08/05/24  4:25 PM    Specimen: Nasopharyngeal; Swab   Result Value Ref Range    SARS CoV2 PCR Negative Negative   EKG 12-lead, complete    Collection Time: 08/07/24 11:29 AM   Result Value Ref Range    Systolic Blood Pressure  mmHg    Diastolic Blood Pressure  mmHg    Ventricular Rate 74 BPM    Atrial Rate 74 BPM    MO Interval 152 ms    QRS Duration 68 ms     ms    QTc 601 ms    P Axis 40 degrees    R AXIS 16 degrees    T Axis 51 degrees    Interpretation ECG       ** Poor data quality, interpretation may be adversely affected  Sinus rhythm  Non-specific ST & T wave changes in the lateral leads  Abnormal ECG  When compared with ECG of 20-Aug-2021 12:35,  There have been no significant changes  Confirmed by fellow Ezequiel Paz ([id number]) on  8/8/2024 9:43:01 AM  Confirmed by MD DOWNEY HENRI ([id number]) on 8/8/2024 10:40:10 PM     EKG 12-lead, complete    Collection Time: 08/07/24  2:10 PM   Result Value Ref Range    Systolic Blood Pressure  mmHg    Diastolic Blood Pressure  mmHg    Ventricular Rate 75 BPM    Atrial Rate 75 BPM    IL Interval 158 ms    QRS Duration 70 ms     ms    QTc 424 ms    P Axis 41 degrees    R AXIS 30 degrees    T Axis 56 degrees    Interpretation ECG       Sinus rhythm with sinus arrhythmia  Septal infarct (cited on or before 20-Aug-2021)  Abnormal ECG  When compared with ECG of 07-Aug-2024 11:29, (unconfirmed)  QT has shortened  Confirmed by MD DOWNEY HENRI ([id number]) on 8/8/2024 8:44:53 AM     HCG qualitative urine    Collection Time: 08/07/24  6:59 PM   Result Value Ref Range    hCG Urine Qualitative Negative Negative   Urine Drug Screen Panel    Collection Time: 08/07/24  6:59 PM   Result Value Ref Range    Amphetamines Urine Screen Negative Screen Negative    Barbituates Urine Screen Negative Screen Negative    Benzodiazepine Urine Screen Negative Screen Negative    Cannabinoids Urine Screen Negative Screen Negative    Cocaine Urine Screen Negative Screen Negative    Fentanyl Qual Urine Screen Negative Screen Negative    Opiates Urine Screen Negative Screen Negative    PCP Urine Screen Negative Screen Negative   EKG 12-lead, complete    Collection Time: 08/08/24  8:59 AM   Result Value Ref Range    Systolic Blood Pressure  mmHg    Diastolic Blood Pressure  mmHg    Ventricular Rate 64 BPM    Atrial Rate 64 BPM    IL Interval 170 ms    QRS Duration 70 ms     ms    QTc 425 ms    P Axis 39 degrees    R AXIS 29 degrees    T Axis 52 degrees    Interpretation ECG       Sinus rhythm with sinus arrhythmia  Septal infarct (cited on or before 20-Aug-2021)  Abnormal ECG  When compared with ECG of 07-Aug-2024 14:10,  No significant change was found  Confirmed by MD PAMELLA, CHACHA ([id number]) on 8/9/2024 10:32:34 AM     Glucose by  meter    Collection Time: 08/08/24 12:26 PM   Result Value Ref Range    GLUCOSE BY METER POCT 111 (H) 70 - 99 mg/dL   EKG 12-lead, complete    Collection Time: 08/09/24  4:24 PM   Result Value Ref Range    Systolic Blood Pressure  mmHg    Diastolic Blood Pressure  mmHg    Ventricular Rate 77 BPM    Atrial Rate 77 BPM    ID Interval 156 ms    QRS Duration 78 ms     ms    QTc 495 ms    P Axis 41 degrees    R AXIS 27 degrees    T Axis 48 degrees    Interpretation ECG       Sinus rhythm  Septal infarct (cited on or before 20-Aug-2021)  Abnormal ECG  When compared with ECG of 08-Aug-2024 08:59,  Nonspecific T wave abnormality, improved in Inferior leads  Nonspecific T wave abnormality, improved in Anterolateral leads  QT has lengthened     Troponin T, High Sensitivity    Collection Time: 08/09/24  4:39 PM   Result Value Ref Range    Troponin T, High Sensitivity 16 (H) <=14 ng/L   Basic metabolic panel    Collection Time: 08/09/24  4:39 PM   Result Value Ref Range    Sodium 139 135 - 145 mmol/L    Potassium 3.6 3.4 - 5.3 mmol/L    Chloride 102 98 - 107 mmol/L    Carbon Dioxide (CO2) 22 22 - 29 mmol/L    Anion Gap 15 7 - 15 mmol/L    Urea Nitrogen 9.7 6.0 - 20.0 mg/dL    Creatinine 0.88 0.51 - 0.95 mg/dL    GFR Estimate 86 >60 mL/min/1.73m2    Calcium 9.9 8.8 - 10.4 mg/dL    Glucose 108 (H) 70 - 99 mg/dL   CBC with platelets    Collection Time: 08/09/24  4:39 PM   Result Value Ref Range    WBC Count 6.2 4.0 - 11.0 10e3/uL    RBC Count 4.35 3.80 - 5.20 10e6/uL    Hemoglobin 13.3 11.7 - 15.7 g/dL    Hematocrit 38.0 35.0 - 47.0 %    MCV 87 78 - 100 fL    MCH 30.6 26.5 - 33.0 pg    MCHC 35.0 31.5 - 36.5 g/dL    RDW 12.0 10.0 - 15.0 %    Platelet Count 196 150 - 450 10e3/uL   Troponin T, High Sensitivity    Collection Time: 08/09/24  6:49 PM   Result Value Ref Range    Troponin T, High Sensitivity 13 <=14 ng/L   Troponin T, High Sensitivity    Collection Time: 08/10/24  2:20 PM   Result Value Ref Range    Troponin T, High  Sensitivity 12 <=14 ng/L            Precautions:     Behavioral Orders   Procedures    Assault precautions    Cheeking Precautions (behavioral units)    Code 1 - Restrict to Unit    Elopement precautions    Fall precautions     Intake is low, pt almost fell over in the dining room and needed assistance from staff to sit down.    Routine Programming     As clinically indicated    Status 15     Every 15 minutes.    Status Individual Observation     Patient SIO status reviewed with team/RN.  Please also refer to RN/team documentation for add'l detail.    -SIO staff to monitor following which have contributed to patient being on SIO:  Confusion, disorientation resulting in agitation, assault risk  -Possible interventions SIO staff could use to support patient's treatment progress:  Provide reassurance and reorientation  -When following observed, team will review discontinuation of SIO:     Order Specific Question:   CONTINUOUS 24 hours / day     Answer:   5 feet     Order Specific Question:   Indications for SIO     Answer:   Assault risk     Order Specific Question:   Indications for SIO     Answer:   Severe intrusiveness    Suicide precautions: Suicide Risk: MODERATE; Clinical rationale to override score: modification to the care environment     Patients on Suicide Precautions should have a Combination Diet ordered that includes a Diet selection(s) AND a Behavioral Tray selection for Safe Tray - with utensils, or Safe Tray - NO utensils       Order Specific Question:   Suicide Risk     Answer:   MODERATE     Order Specific Question:   Clinical rationale to override score:     Answer:   modification to the care environment            Psychiatric Examination:   Temp: 97.3  F (36.3  C) Temp src: Oral BP: 102/64 Pulse: 71     SpO2: 99 % O2 Device: None (Room air)    Weight is 140 lbs 0 oz  Body mass index is 24.03 kg/m .    Appearance: awake, alert and adequately groomed  Attitude:  cooperative  Eye Contact:    labile  Mood:  anxious and depressed  Affect:  mood congruent  Speech:  mumbling  Psychomotor Behavior:  no evidence of tardive dyskinesia, dystonia, or tics  Throught Process:  goal oriented  Associations:  no loose associations  Thought Content:  no evidence of suicidal ideation or homicidal ideation and paranoia, delusions  Insight:  limited  Judgement:  limited  Oriented to:  time, person, and place  Attention Span and Concentration:  limited  Recent and Remote Memory:  limited         Precautions:     Behavioral Orders   Procedures    Assault precautions    Cheeking Precautions (behavioral units)    Code 1 - Restrict to Unit    Elopement precautions    Fall precautions     Intake is low, pt almost fell over in the dining room and needed assistance from staff to sit down.    Routine Programming     As clinically indicated    Status 15     Every 15 minutes.    Status Individual Observation     Patient SIO status reviewed with team/RN.  Please also refer to RN/team documentation for add'l detail.    -SIO staff to monitor following which have contributed to patient being on SIO:  Confusion, disorientation resulting in agitation, assault risk  -Possible interventions SIO staff could use to support patient's treatment progress:  Provide reassurance and reorientation  -When following observed, team will review discontinuation of SIO:     Order Specific Question:   CONTINUOUS 24 hours / day     Answer:   5 feet     Order Specific Question:   Indications for SIO     Answer:   Assault risk     Order Specific Question:   Indications for SIO     Answer:   Severe intrusiveness    Suicide precautions: Suicide Risk: MODERATE; Clinical rationale to override score: modification to the care environment     Patients on Suicide Precautions should have a Combination Diet ordered that includes a Diet selection(s) AND a Behavioral Tray selection for Safe Tray - with utensils, or Safe Tray - NO utensils       Order Specific Question:    Suicide Risk     Answer:   MODERATE     Order Specific Question:   Clinical rationale to override score:     Answer:   modification to the care environment          DIagnoses:   Bipolar affective disorder, current current episode mixed, severe, with psychosis  Breast cancer         Plan:   Medications:  -- Discontinue lithium due to hypothyroidism thyroidism poor fluid intake  -- Discontinue Seroquel, is not helpful  -- Start Zyprexa, 2.5 mg every morning and 5 mg at bedtime.       --increase to 5 mg qam and 10 mg, at bedtime on 8/9      Increase to 15 mg, at bedtime  -- Start Trileptal 150 mg, bid for mood stabilization.   -- May start Prozac to target the depression and anxiety  -- Additional medications will include hydroxyzine, Zyprexa, trazodone    Medical:  --Internal medicine to follow up for medical problems     --Lab work was reviewed.  Abnormal results include glucose 100, TSH 29.64, T4 0.68.  Pregnancy test was negative.    --Initial EKG shows QTc of 601.  Subsequent EKG shows a normal QTc.      Others:  --Intake and output  --Calorie counts  --Boost, chocolate flavor    --SIO for confusion and psychosis    --Care was coordinated with the treatment team.   --The patient was consulted on nature of illness and treatment options.      Disposition Plan   Reason for ongoing admission: is unable to care for self due to severe psychosis or manuela  Discharge location: home with family  Discharge Medications: not ordered  Follow-up Appointments: not scheduled  Legal Status:     Initially on 72 hours hold.  Petition was started in the emergency room.  The petition was not supported by the court.  The appeal was also rejected as well.  The patient is now voluntary.  Discharge will be granted once symptoms improved.    Contacts:  Family/Friends:  Spouse - Daniel Nicholson (phone: [phone]) - LAWANDA obtained 8/4/2024  Sister - Selina Bartlett (phone: [phone])     Outpatient Providers:  Primary Care Provider - Marilyn  KRYSTLE Caruso of [facility] (phone: [phone])  [facility] Pre-Petition Screener - Ezequiel West (phone: [phone], email: [email or web address].)?     Radha Morin APRN, CNP    This note was created with the help of Dragon dictation system. All grammatical/typing errors or context distortion are unintentional and inherent to software.

## 2024-08-11 PROCEDURE — 250N000013 HC RX MED GY IP 250 OP 250 PS 637: Performed by: PSYCHIATRY & NEUROLOGY

## 2024-08-11 PROCEDURE — 250N000013 HC RX MED GY IP 250 OP 250 PS 637: Performed by: PHYSICIAN ASSISTANT

## 2024-08-11 PROCEDURE — 124N000002 HC R&B MH UMMC

## 2024-08-11 PROCEDURE — 250N000013 HC RX MED GY IP 250 OP 250 PS 637: Performed by: NURSE PRACTITIONER

## 2024-08-11 RX ADMIN — OLANZAPINE 5 MG: 5 TABLET, FILM COATED ORAL at 08:53

## 2024-08-11 RX ADMIN — Medication 25 MCG: at 08:53

## 2024-08-11 RX ADMIN — LEVOTHYROXINE SODIUM 112 MCG: 112 TABLET ORAL at 08:53

## 2024-08-11 RX ADMIN — SENNOSIDES 8.6 MG: 8.6 TABLET, FILM COATED ORAL at 21:09

## 2024-08-11 RX ADMIN — HYDROXYZINE HYDROCHLORIDE 25 MG: 25 TABLET, FILM COATED ORAL at 21:09

## 2024-08-11 RX ADMIN — OLANZAPINE 15 MG: 15 TABLET, FILM COATED ORAL at 21:09

## 2024-08-11 RX ADMIN — TRAZODONE HYDROCHLORIDE 50 MG: 50 TABLET ORAL at 21:09

## 2024-08-11 RX ADMIN — SENNOSIDES 8.6 MG: 8.6 TABLET, FILM COATED ORAL at 08:53

## 2024-08-11 RX ADMIN — OXCARBAZEPINE 150 MG: 150 TABLET, FILM COATED ORAL at 21:09

## 2024-08-11 RX ADMIN — OXCARBAZEPINE 150 MG: 150 TABLET, FILM COATED ORAL at 08:53

## 2024-08-11 ASSESSMENT — ACTIVITIES OF DAILY LIVING (ADL)
ADLS_ACUITY_SCORE: 65
ADLS_ACUITY_SCORE: 55
ADLS_ACUITY_SCORE: 65
ADLS_ACUITY_SCORE: 45
ADLS_ACUITY_SCORE: 65
ADLS_ACUITY_SCORE: 55
ADLS_ACUITY_SCORE: 55
DRESS: INDEPENDENT
ADLS_ACUITY_SCORE: 55
ADLS_ACUITY_SCORE: 55
ADLS_ACUITY_SCORE: 65
ADLS_ACUITY_SCORE: 55
ORAL_HYGIENE: INDEPENDENT
ADLS_ACUITY_SCORE: 45
ORAL_HYGIENE: INDEPENDENT;WITH SUPERVISION
ADLS_ACUITY_SCORE: 45
ADLS_ACUITY_SCORE: 65
ADLS_ACUITY_SCORE: 55
HYGIENE/GROOMING: INDEPENDENT;WITH SUPERVISION
ADLS_ACUITY_SCORE: 65
LAUNDRY: UNABLE TO COMPLETE
ADLS_ACUITY_SCORE: 55
HYGIENE/GROOMING: INDEPENDENT
ADLS_ACUITY_SCORE: 65
DRESS: SCRUBS (BEHAVIORAL HEALTH);INDEPENDENT;WITH SUPERVISION
ADLS_ACUITY_SCORE: 55
ADLS_ACUITY_SCORE: 45
ADLS_ACUITY_SCORE: 55

## 2024-08-11 NOTE — PLAN OF CARE
"  Problem: Psychotic Signs/Symptoms  Goal: Improved Behavioral Control (Psychotic Signs/Symptoms)  Outcome: Progressing   Goal Outcome Evaluation:     Plan of Care Reviewed With: patient     RN Assessment:  SI/Self harm:  pt denies  Aggression/agitation/HI:  none reported or observed  AVH:  pt denies  Sleep: adequate, napping in the morning  PRN Med: No PRNs administered this shift  Medication AE: none reported or observed  Physical Complaints/Issues: pt denies  I & O: eating and drinking well  LBM: yesterday  ADLs: independent  Visits: sister visited this afternoon  Vitals: WNL   COVID 19 Assessment:  no symptoms present  Milieu Participation: minimal, though pt did spend some time in the lounge after lunch watching a movie with peers  Behavior: pt presents as more calm this shift, responses are quicker than previous days, improved eye contact. Affect is flat, pt denies feeling anxious or depressed, states \"I'm just a bit tired\". Writer expressed that pt appears more calm and controlled today, and pt expressed agreement. Denies having any questions or concerns about her medications.   Pt remains on SIO for disorganization and impulsive behavior. Will reassess tomorrow, as pt's condition appears much improved today.   No other concerns at this time. Nursing will continue to monitor and assess.    "

## 2024-08-11 NOTE — PLAN OF CARE
Problem: Sleep Disturbance  Goal: Adequate Sleep/Rest  Intervention: Promote Sleep/Rest  Recent Flowsheet Documentation  Taken 8/11/2024 0156 by Fer Cassidy RN  Sleep/Rest Enhancement: noise level reduced   Goal Outcome Evaluation:       Intermittently sleeping, remained in her room, in bed.  SIO 1:1 continues. No behaviors noted or reported.  No reported chest pain, no distress.  15 minutes safety checks per protocol.

## 2024-08-11 NOTE — PLAN OF CARE
She is active in the [facility] [place] and watching television in the shift.  She is slightly delayed in speech, cognition in interactions.  She denies pain/discomfort and cold, flu like symptoms when asked.  She denies problematic anxiety and depression when asked.  She denies ideas of harming herself and others when asked.  She denies racing thoughts when asked.  States that she last saw visual images yesterday evening in discussions, and denies experiencing any hallucinations today.  States that she was able to sleep well and felt rested upon waking up this morning.  She is able to ambulate and transfer herself without difficulty in the shift.  Gait belt placed in medicine room, will use as needed.  She ate 100 percent of her dinner.  PRN trazodone, atarax at hs.  She remains on an SIO 1:1 due to unsteadiness and disorganized thought process while hospitalized.

## 2024-08-12 LAB
ATRIAL RATE - MUSE: 69 BPM
DIASTOLIC BLOOD PRESSURE - MUSE: NORMAL MMHG
INTERPRETATION ECG - MUSE: NORMAL
P AXIS - MUSE: 39 DEGREES
PR INTERVAL - MUSE: 150 MS
QRS DURATION - MUSE: 68 MS
QT - MUSE: 426 MS
QTC - MUSE: 456 MS
R AXIS - MUSE: 33 DEGREES
SYSTOLIC BLOOD PRESSURE - MUSE: NORMAL MMHG
T AXIS - MUSE: 41 DEGREES
VENTRICULAR RATE- MUSE: 69 BPM

## 2024-08-12 PROCEDURE — 97150 GROUP THERAPEUTIC PROCEDURES: CPT | Mod: GO

## 2024-08-12 PROCEDURE — 124N000002 HC R&B MH UMMC

## 2024-08-12 PROCEDURE — 250N000013 HC RX MED GY IP 250 OP 250 PS 637: Performed by: PHYSICIAN ASSISTANT

## 2024-08-12 PROCEDURE — 250N000013 HC RX MED GY IP 250 OP 250 PS 637: Performed by: NURSE PRACTITIONER

## 2024-08-12 PROCEDURE — 99232 SBSQ HOSP IP/OBS MODERATE 35: CPT | Performed by: NURSE PRACTITIONER

## 2024-08-12 RX ADMIN — Medication 25 MCG: at 07:48

## 2024-08-12 RX ADMIN — SENNOSIDES 8.6 MG: 8.6 TABLET, FILM COATED ORAL at 07:48

## 2024-08-12 RX ADMIN — LEVOTHYROXINE SODIUM 112 MCG: 112 TABLET ORAL at 07:48

## 2024-08-12 RX ADMIN — OXCARBAZEPINE 150 MG: 150 TABLET, FILM COATED ORAL at 07:48

## 2024-08-12 RX ADMIN — OLANZAPINE 15 MG: 15 TABLET, FILM COATED ORAL at 20:17

## 2024-08-12 RX ADMIN — OLANZAPINE 5 MG: 5 TABLET, FILM COATED ORAL at 07:48

## 2024-08-12 RX ADMIN — SENNOSIDES 8.6 MG: 8.6 TABLET, FILM COATED ORAL at 20:18

## 2024-08-12 RX ADMIN — OXCARBAZEPINE 150 MG: 150 TABLET, FILM COATED ORAL at 20:17

## 2024-08-12 ASSESSMENT — ACTIVITIES OF DAILY LIVING (ADL)
ADLS_ACUITY_SCORE: 65

## 2024-08-12 NOTE — PLAN OF CARE
"  Problem: Psychotic Signs/Symptoms  Goal: Enhanced Social, Occupational or Functional Skills (Psychotic Signs/Symptoms)  Intervention: Promote Social, Occupational and Functional Ability  Recent Flowsheet Documentation  Taken 8/12/2024 0826 by Mirna Hill RN  Trust Relationship/Rapport:   care explained   choices provided   emotional support provided   empathic listening provided   questions answered   questions encouraged   reassurance provided   thoughts/feelings acknowledged   Goal Outcome Evaluation:     Plan of Care Reviewed With: patient     RN Assessment:  SI/Self harm:  pt denies  Aggression/agitation/HI: none reported or observed   AVH:  pt denies visual hallucinations today  Sleep: adequate  PRN Med: No PRNs administered this shift  Medication AE: none reported or observed  Physical Complaints/Issues: pt denies physical concerns this morning  I & O: eating and drinking well, see I&O flowsheet for intake percentages  LBM: \"yesterday, or the day before\"  ADLs: independent  Visits: none this shift  Vitals:  WNL  COVID 19 Assessment:  no symptoms present  Milieu Participation: more present this shift, came out for meals, sat in the lounge watching TV with peers this morning  Behavior: overall calm, pleasant and cooperative. Pt speaks quietly, response time is improved. Pt reports feeling steady while walking. Pt's SIO was discontinued today as she has shown much improvement regarding organized and calm behavior. Staff continue to monitor closely per unit protocol.    "

## 2024-08-12 NOTE — PLAN OF CARE
Rehab Group    Start time: 1315  End time: 1400  Patient time total: 45 minutes    attended full group    #1 attended   Group Type: occupational therapy   Group Topic Covered: cognitive activities, coping skills, and healthy leisure time   Group Session Detail: OT: Education on cognitive wellness and healthy leisure engagement with an interactive social activity containing a cognitive component ([place]) to increase concentration, focus, attention to task/detail, memory recall, coping with stress, healthy distraction engagement, symptom management, healthy leisure engagement, social wellness, cognitive wellness, and abstract and concrete thinking    Patient Response/Contribution:  cooperative with task and actively engaged, flat, semi-confused, pleasant   Patient Detail: Pt sat with therapist and appeared receptive to education about cognitive wellness. Pt initially struggled to follow verbal directions and visual demonstration for novel cognitive task but with repetition was able to IND engage in the task. Pt able to IND identify responses for each of her turns. Pt reported she enjoyed the activity and verbalized understanding of importance of cognitive wellness in a healthy lifestyle.      No Charge due to only one patient in group    Patient Active Problem List   Diagnosis    Mental health disorder    Sleep deprivation    Bipolar 1 disorder (H)    Bipolar disorder, current episode depressed, severe, with psychotic features (H)    Suicide attempt by substance overdose (H)    Insomnia, unspecified type

## 2024-08-12 NOTE — PLAN OF CARE
BEH IP Unit Acuity Rating Score (UARS)  Patient is given one point for every criteria they meet.    CRITERIA SCORING   On a 72 hour hold, court hold, committed, stay of commitment, or revocation. 0    Patient LOS on BEH unit exceeds 20 days. 0  LOS: 6   Patient under guardianship, 55+, otherwise medically complex, or under age 11. 1   Suicide ideation without relief of precipitating factors. 0   Current plan for suicide. 0   Current plan for homicide. 0   Imminent risk or actual attempt to seriously harm another without relief of factors precipitating the attempt. 0   Severe dysfunction in daily living (ex: complete neglect for self care, extreme disruption in vegetative function, extreme deterioration in social interactions). 1   Recent (last 7 days) or current physical aggression in the ED or on unit. 0   Restraints or seclusion episode in past 72 hours. 0   Recent (last 7 days) or current verbal aggression, agitation, yelling, etc., while in the ED or unit. 1   Active psychosis. 1   Need for constant or near constant redirection (from leaving, from others, etc).  1   Intrusive or disruptive behaviors. 1   Patient requires 3 or more hours of individualized nursing care per 8-hour shift (i.e. for ADLs, meds, therapeutic interventions). 1   TOTAL 7

## 2024-08-12 NOTE — PLAN OF CARE
She slept 7 hours during the night.  No concerns noted or reported during the shift.  She remains on an SIO 1:1 due to unsteadiness, disorganized thought process, and intrusiveness during hospitalization.

## 2024-08-12 NOTE — PLAN OF CARE
Team Note Due:  08/12/2024    Assessment/Intervention/Current Symtoms and Care Coordination:  Chart review and met with team, discussed pt progress, symptomology, and response to treatment.  Discussed the discharge plan and any potential impediments to discharge.    SIO was discontinued due to no falls and improved engagement including eye contact.  She had a visitor over the weekend.  Met with her today regarding existing providers and existing therapist.  When asked about participating in support groups she expressed interest.  She is also interested in outpatient group therapy.  She reflected on how yoga was beneficial during her stay and would like to continue yoga after discharge.  She also inquired about acupuncture resources after discharge.         Discharge Plan or Goal:  Return home with support of outpatient providers      Barriers to Discharge:  Provider still adjusting medications     Referral Status:  No new referrals      Legal Status:  Voluntary      Contacts (include LAWANDA status):  Family/Friends:  Spouse - Daniel Nicholson (phone: [phone]) - LAWANDA obtained 8/4/2024  Sister - Selina Bartlett (phone: [phone])     Outpatient Providers:    Psychiatrist: Dr. Yi  [place] [address] [address] [place] [id number]   6.[address]  Phone: [phone]    Therapist: Slime Wan   [address] [place] [address]   3.3 [address]  Phone: [phone]    Outpatient Providers:  Primary Care Provider - Marilyn Caruso PA-C of [facility] (phone: [phone])       Upcoming Meetings and Dates/Important Information and next steps:  ELSA

## 2024-08-12 NOTE — PLAN OF CARE
received voicemail from Daniel requesting update on expected timeline for discharge, as well as to request that provider complete [place] paperwork for both him and Fiorella.  returned call and provided update that team is anticipating readiness for discharge about midweek this week. He is agreeable with this timeframe as he notes he just spoke with Fiorella and she's sounding much better. He thanked team again for allowing him and Fiorella's sister to come in last week to talk with her about staying inpatient through part of this week. Daniel notes Fiorella is hopeful to return home this week as well. He plans to email leave paperwork to  and states so far he only has paperwork from his employer, but he plans to follow up with Fiorella's employer tomorrow. No other questions/concerns expressed at this time.

## 2024-08-12 NOTE — PROGRESS NOTES
[facility], [place]   Psychiatric Progress Note        Interim History:   Team meeting report: The patient's care was discussed with the treatment team during the daily team meeting and/or staff's chart notes were reviewed.  Staff reports that the patient has been calm, pleasant, cooperative.  Responses have been quicker than previously.  Improved eye contact.  Affect is flat.  Denies all mental health symptoms.  Reports feeling tired.  Reported no hallucinations but admitted having some the day before.  No fall incidents yesterday.  Med compliant.  Ate 100% of her meals.  Slept all night.    Met with patient.  She is in bed sleeping.  Appears flat and sad.  Responses are delayed but better.  She is feeling tired.  Reports improvement in depression and anxiety.  Reports auditory hallucinations are minimal.  Does not elaborate what she hear. Denies visual hallucinations.  Denies suicidal or homicidal ideation.  Reports improvement in her appetite.  Discussed medication changes.  The patient is agreeable to stop the morning dose of the Zyprexa and eventually increase the bedtime dose if necessary.  She is agreeable to take an antidepressant.  The patient has no questions or concerns today.    Spoke with patient's  Daniel phone #[phone].  States he visited over the weekend and she marked improvement in patient's functioning.  At baseline, the patient is an introvert.  She is very quiet person in general.  At baseline, the patient responds normally and is very organized.  States that the patient feels down because she is in the hospital.  He worries that starting an antidepressant will make her manic again.  Discussed disposition.  He is aware that the patient will likely be discharged sometimes this week. Will talk to him again on Wednesday.         Medications:     Current Facility-Administered Medications   Medication Dose Route Frequency Provider Last Rate Last Admin     levothyroxine (SYNTHROID/LEVOTHROID) tablet 112 mcg  112 mcg Oral QAM AC Idalmis Jc PA   112 mcg at 08/12/24 0748    OLANZapine (zyPREXA) tablet 15 mg  15 mg Oral At Bedtime Radha Morin, APRN CNP   15 mg at 08/11/24 2109    OXcarbazepine (TRILEPTAL) tablet 150 mg  150 mg Oral BID YadiraevaRadhaa, APRN CNP   150 mg at 08/12/24 0748    sennosides (SENOKOT) tablet 8.6 mg  8.6 mg Oral BID Yadiraeva, Radha Whitea, APRN CNP   8.6 mg at 08/12/24 0748    Vitamin D3 (CHOLECALCIFEROL) tablet 25 mcg  25 mcg Oral Daily Idalmis Jc PA   25 mcg at 08/12/24 0748            Allergies:     Allergies   Allergen Reactions    Pcn [Penicillins] Unknown     Has been told she was allergic since she was a child.             Labs:     Recent Results (from the past 672 hour(s))   Lithium level    Collection Time: 08/03/24  8:44 PM   Result Value Ref Range    Lithium 0.45 (L) 0.60 - 1.20 mmol/L   TSH with free T4 reflex    Collection Time: 08/03/24  8:44 PM   Result Value Ref Range    TSH 24.61 (H) 0.30 - 4.20 uIU/mL   T4 free    Collection Time: 08/03/24  8:44 PM   Result Value Ref Range    Free T4 0.82 (L) 0.90 - 1.70 ng/dL   TSH with free T4 reflex    Collection Time: 08/04/24  7:44 PM   Result Value Ref Range    TSH 29.64 (H) 0.30 - 4.20 uIU/mL   T4 free    Collection Time: 08/04/24  7:44 PM   Result Value Ref Range    Free T4 0.68 (L) 0.90 - 1.70 ng/dL   Comprehensive metabolic panel    Collection Time: 08/04/24  7:44 PM   Result Value Ref Range    Sodium 138 135 - 145 mmol/L    Potassium 4.0 3.4 - 5.3 mmol/L    Carbon Dioxide (CO2) 23 22 - 29 mmol/L    Anion Gap 11 7 - 15 mmol/L    Urea Nitrogen 11.7 6.0 - 20.0 mg/dL    Creatinine 0.79 0.51 - 0.95 mg/dL    GFR Estimate >90 >60 mL/min/1.73m2    Calcium 9.5 8.8 - 10.4 mg/dL    Chloride 104 98 - 107 mmol/L    Glucose 100 (H) 70 - 99 mg/dL    Alkaline Phosphatase 62 40 - 150 U/L    AST 21 0 - 45 U/L    ALT 18 0 - 50 U/L    Protein Total 6.6 6.4 -  8.3 g/dL    Albumin 4.5 3.5 - 5.2 g/dL    Bilirubin Total 0.2 <=1.2 mg/dL   CBC with platelets    Collection Time: 08/05/24  2:51 PM   Result Value Ref Range    WBC Count 6.0 4.0 - 11.0 10e3/uL    RBC Count 4.29 3.80 - 5.20 10e6/uL    Hemoglobin 13.3 11.7 - 15.7 g/dL    Hematocrit 38.8 35.0 - 47.0 %    MCV 90 78 - 100 fL    MCH 31.0 26.5 - 33.0 pg    MCHC 34.3 31.5 - 36.5 g/dL    RDW 11.9 10.0 - 15.0 %    Platelet Count 214 150 - 450 10e3/uL   Asymptomatic COVID-19 Virus (Coronavirus) by PCR Nasopharyngeal    Collection Time: 08/05/24  4:25 PM    Specimen: Nasopharyngeal; Swab   Result Value Ref Range    SARS CoV2 PCR Negative Negative   EKG 12-lead, complete    Collection Time: 08/07/24 11:29 AM   Result Value Ref Range    Systolic Blood Pressure  mmHg    Diastolic Blood Pressure  mmHg    Ventricular Rate 74 BPM    Atrial Rate 74 BPM    MT Interval 152 ms    QRS Duration 68 ms     ms    QTc 601 ms    P Axis 40 degrees    R AXIS 16 degrees    T Axis 51 degrees    Interpretation ECG       ** Poor data quality, interpretation may be adversely affected  Sinus rhythm  Non-specific ST & T wave changes in the lateral leads  Abnormal ECG  When compared with ECG of 20-Aug-2021 12:35,  There have been no significant changes  Confirmed by fellow Ezequiel Paz ([id number]) on 8/8/2024 9:43:01 AM  Confirmed by MD DOWNEY HENRI [id number]) on 8/8/2024 10:40:10 PM     EKG 12-lead, complete    Collection Time: 08/07/24  2:10 PM   Result Value Ref Range    Systolic Blood Pressure  mmHg    Diastolic Blood Pressure  mmHg    Ventricular Rate 75 BPM    Atrial Rate 75 BPM    MT Interval 158 ms    QRS Duration 70 ms     ms    QTc 424 ms    P Axis 41 degrees    R AXIS 30 degrees    T Axis 56 degrees    Interpretation ECG       Sinus rhythm with sinus arrhythmia  Septal infarct (cited on or before 20-Aug-2021)  Abnormal ECG  When compared with ECG of 07-Aug-2024 11:29, (unconfirmed)  QT has shortened  Confirmed by MD DOWNEY HENRI  ([id number]) on 8/8/2024 8:44:53 AM     HCG qualitative urine    Collection Time: 08/07/24  6:59 PM   Result Value Ref Range    hCG Urine Qualitative Negative Negative   Urine Drug Screen Panel    Collection Time: 08/07/24  6:59 PM   Result Value Ref Range    Amphetamines Urine Screen Negative Screen Negative    Barbituates Urine Screen Negative Screen Negative    Benzodiazepine Urine Screen Negative Screen Negative    Cannabinoids Urine Screen Negative Screen Negative    Cocaine Urine Screen Negative Screen Negative    Fentanyl Qual Urine Screen Negative Screen Negative    Opiates Urine Screen Negative Screen Negative    PCP Urine Screen Negative Screen Negative   EKG 12-lead, complete    Collection Time: 08/08/24  8:59 AM   Result Value Ref Range    Systolic Blood Pressure  mmHg    Diastolic Blood Pressure  mmHg    Ventricular Rate 64 BPM    Atrial Rate 64 BPM    W[place] Interval 170 ms    QRS Duration 70 ms     ms    QTc 425 ms    P Axis 39 degrees    R AXIS 29 degrees    T Axis 52 degrees    Interpretation ECG       Sinus rhythm with sinus arrhythmia  Septal infarct (cited on or before 20-Aug-2021)  Abnormal ECG  When compared with ECG of 07-Aug-2024 14:10,  No significant change was found  Confirmed by MD PAMELLA, CHACHA [id number]) on 8/9/2024 10:32:34 AM     Glucose by meter    Collection Time: 08/08/24 12:26 PM   Result Value Ref Range    GLUCOSE BY METER POCT 111 (H) 70 - 99 mg/dL   EKG 12-lead, complete    Collection Time: 08/09/24  4:24 PM   Result Value Ref Range    Systolic Blood Pressure  mmHg    Diastolic Blood Pressure  mmHg    Ventricular Rate 77 BPM    Atrial Rate 77 BPM    WY Interval 156 ms    QRS Duration 78 ms     ms    QTc 495 ms    P Axis 41 degrees    R AXIS 27 degrees    T Axis 48 degrees    Interpretation ECG       Sinus rhythm  Septal infarct (cited on or before 20-Aug-2021)  Abnormal ECG  When compared with ECG of 08-Aug-2024 08:59,  Nonspecific T wave abnormality, improved in Inferior  leads  Nonspecific T wave abnormality, improved in Anterolateral leads  QT has lengthened     Troponin T, High Sensitivity    Collection Time: 08/09/24  4:39 PM   Result Value Ref Range    Troponin T, High Sensitivity 16 (H) <=14 ng/L   Basic metabolic panel    Collection Time: 08/09/24  4:39 PM   Result Value Ref Range    Sodium 139 135 - 145 mmol/L    Potassium 3.6 3.4 - 5.3 mmol/L    Chloride 102 98 - 107 mmol/L    Carbon Dioxide (CO2) 22 22 - 29 mmol/L    Anion Gap 15 7 - 15 mmol/L    Urea Nitrogen 9.7 6.0 - 20.0 mg/dL    Creatinine 0.88 0.51 - 0.95 mg/dL    GFR Estimate 86 >60 mL/min/1.73m2    Calcium 9.9 8.8 - 10.4 mg/dL    Glucose 108 (H) 70 - 99 mg/dL   CBC with platelets    Collection Time: 08/09/24  4:39 PM   Result Value Ref Range    WBC Count 6.2 4.0 - 11.0 10e3/uL    RBC Count 4.35 3.80 - 5.20 10e6/uL    Hemoglobin 13.3 11.7 - 15.7 g/dL    Hematocrit 38.0 35.0 - 47.0 %    MCV 87 78 - 100 fL    MCH 30.6 26.5 - 33.0 pg    MCHC 35.0 31.5 - 36.5 g/dL    RDW 12.0 10.0 - 15.0 %    Platelet Count 196 150 - 450 10e3/uL   Troponin T, High Sensitivity    Collection Time: 08/09/24  6:49 PM   Result Value Ref Range    Troponin T, High Sensitivity 13 <=14 ng/L   EKG 12-lead, complete    Collection Time: 08/10/24  1:58 PM   Result Value Ref Range    Systolic Blood Pressure  mmHg    Diastolic Blood Pressure  mmHg    Ventricular Rate 69 BPM    Atrial Rate 69 BPM    IA Interval 150 ms    QRS Duration 68 ms     ms    QTc 456 ms    P Axis 39 degrees    R AXIS 33 degrees    T Axis 41 degrees    Interpretation ECG       Sinus rhythm  Anteroseptal infarct (cited on or before 20-Aug-2021)  Abnormal ECG  When compared with ECG of 09-Aug-2024 16:24, (unconfirmed)  No significant change was found  Confirmed by MD SHAYAN, LUIS ([id number]) on 8/12/2024 8:31:06 AM     Troponin T, High Sensitivity    Collection Time: 08/10/24  2:20 PM   Result Value Ref Range    Troponin T, High Sensitivity 12 <=14 ng/L   Extra Purple Top Tube     Collection Time: 08/10/24  2:20 PM   Result Value Ref Range    Hold Specimen JIC             Precautions:     Behavioral Orders   Procedures    Assault precautions    Cheeking Precautions (behavioral units)    Code 1 - Restrict to Unit    Elopement precautions    Fall precautions     Intake is low, pt almost fell over in the dining room and needed assistance from staff to sit down.    Routine Programming     As clinically indicated    Status 15     Every 15 minutes.    Suicide precautions: Suicide Risk: MODERATE; Clinical rationale to override score: modification to the care environment     Patients on Suicide Precautions should have a Combination Diet ordered that includes a Diet selection(s) AND a Behavioral Tray selection for Safe Tray - with utensils, or Safe Tray - NO utensils       Order Specific Question:   Suicide Risk     Answer:   MODERATE     Order Specific Question:   Clinical rationale to override score:     Answer:   modification to the care environment            Psychiatric Examination:   Temp: 97.6  F (36.4  C) Temp src: Oral BP: 121/84 Pulse: 64     SpO2: 98 % O2 Device: None (Room air)    Weight is 140 lbs 0 oz  Body mass index is 24.03 kg/m .    Appearance: awake, alert and adequately groomed  Attitude:  cooperative  Eye Contact:   labile  Mood:  anxious, depressed, and better  Affect:  mood congruent  Speech:  clear, coherent  Psychomotor Behavior:  no evidence of tardive dyskinesia, dystonia, or tics  Throught Process:  goal oriented  Associations:  no loose associations  Thought Content:  no evidence of suicidal ideation or homicidal ideation and paranoia, delusions  Insight:  fair  Judgement:  fair  Oriented to:  time, person, and place  Attention Span and Concentration:  limited  Recent and Remote Memory:  limited         Precautions:     Behavioral Orders   Procedures    Assault precautions    Cheeking Precautions (behavioral units)    Code 1 - Restrict to Unit    Elopement precautions     Fall precautions     Intake is low, pt almost fell over in the dining room and needed assistance from staff to sit down.    Routine Programming     As clinically indicated    Status 15     Every 15 minutes.    Suicide precautions: Suicide Risk: MODERATE; Clinical rationale to override score: modification to the care environment     Patients on Suicide Precautions should have a Combination Diet ordered that includes a Diet selection(s) AND a Behavioral Tray selection for Safe Tray - with utensils, or Safe Tray - NO utensils       Order Specific Question:   Suicide Risk     Answer:   MODERATE     Order Specific Question:   Clinical rationale to override score:     Answer:   modification to the care environment          DIagnoses:   Bipolar affective disorder, current current episode mixed, severe, with psychosis  Breast cancer         Plan:   Medications:  -- Discontinue lithium due to hypothyroidism thyroidism poor fluid intake  -- Discontinue Seroquel, is not helpful  -- Start Zyprexa, change to 15 mg, at bedtime. Discontinue the morning dose.   -- Start Trileptal 150 mg, bid for mood stabilization.   -- May start Prozac to target the depression and anxiety  -- Additional medications will include hydroxyzine, Zyprexa, trazodone    Medical:  --Internal medicine to follow up for medical problems     --Lab work was reviewed.  Abnormal results include glucose 100, TSH 29.64, T4 0.68.  Pregnancy test was negative.    --Initial EKG shows QTc of 601.  Subsequent EKG shows a normal QTc.      Others:  --Intake and output  --Calorie counts  --Boost, chocolate flavor    --Discontinue SIO    --Care was coordinated with the treatment team.   --The patient was consulted on nature of illness and treatment options.      Disposition Plan   Reason for ongoing admission: is unable to care for self due to severe psychosis or manuela  Discharge location: home with family  Discharge Medications: not ordered  Follow-up Appointments: not  scheduled  Legal Status:     Initially on 72 hours hold.  Petition was started in the emergency room.  The petition was not supported by the court.  The appeal was also rejected as well.  The patient is now voluntary.  Discharge will be granted once symptoms improved.    Contacts:  Family/Friends:  Spouse - Daniel Nicholson (phone: [phone]) - LAWANDA obtained 8/4/2024  Sister - Selina Bartlett (phone: [phone])     Outpatient Providers:  Primary Care Provider - Marilyn Caruso PA-C of [facility] (phone: [phone])  [facility] Pre-Petition Screener - Ezequiel West (phone: [phone], email: [email or web address].)?     Radha Morin APRN, CNP    This note was created with the help of Dragon dictation system. All grammatical/typing errors or context distortion are unintentional and inherent to software.

## 2024-08-13 VITALS
HEART RATE: 81 BPM | RESPIRATION RATE: 16 BRPM | OXYGEN SATURATION: 98 % | HEIGHT: 64 IN | DIASTOLIC BLOOD PRESSURE: 71 MMHG | SYSTOLIC BLOOD PRESSURE: 109 MMHG | TEMPERATURE: 98.5 F | WEIGHT: 139.4 LBS | BODY MASS INDEX: 23.8 KG/M2

## 2024-08-13 LAB
ATRIAL RATE - MUSE: 77 BPM
DIASTOLIC BLOOD PRESSURE - MUSE: NORMAL MMHG
INTERPRETATION ECG - MUSE: NORMAL
P AXIS - MUSE: 41 DEGREES
PR INTERVAL - MUSE: 156 MS
QRS DURATION - MUSE: 78 MS
QT - MUSE: 438 MS
QTC - MUSE: 495 MS
R AXIS - MUSE: 27 DEGREES
SYSTOLIC BLOOD PRESSURE - MUSE: NORMAL MMHG
T AXIS - MUSE: 48 DEGREES
VENTRICULAR RATE- MUSE: 77 BPM

## 2024-08-13 PROCEDURE — 97150 GROUP THERAPEUTIC PROCEDURES: CPT | Mod: GO

## 2024-08-13 PROCEDURE — 250N000013 HC RX MED GY IP 250 OP 250 PS 637: Performed by: PHYSICIAN ASSISTANT

## 2024-08-13 PROCEDURE — 99232 SBSQ HOSP IP/OBS MODERATE 35: CPT | Performed by: NURSE PRACTITIONER

## 2024-08-13 PROCEDURE — 250N000013 HC RX MED GY IP 250 OP 250 PS 637: Performed by: NURSE PRACTITIONER

## 2024-08-13 PROCEDURE — 124N000002 HC R&B MH UMMC

## 2024-08-13 RX ADMIN — SENNOSIDES 8.6 MG: 8.6 TABLET, FILM COATED ORAL at 08:12

## 2024-08-13 RX ADMIN — Medication 25 MCG: at 08:12

## 2024-08-13 RX ADMIN — OXCARBAZEPINE 150 MG: 150 TABLET, FILM COATED ORAL at 08:12

## 2024-08-13 RX ADMIN — SENNOSIDES 8.6 MG: 8.6 TABLET, FILM COATED ORAL at 20:20

## 2024-08-13 RX ADMIN — LEVOTHYROXINE SODIUM 112 MCG: 112 TABLET ORAL at 07:49

## 2024-08-13 RX ADMIN — OXCARBAZEPINE 150 MG: 150 TABLET, FILM COATED ORAL at 20:20

## 2024-08-13 RX ADMIN — OLANZAPINE 15 MG: 15 TABLET, FILM COATED ORAL at 20:20

## 2024-08-13 ASSESSMENT — ACTIVITIES OF DAILY LIVING (ADL)
LAUNDRY: WITH SUPERVISION
ADLS_ACUITY_SCORE: 45
ADLS_ACUITY_SCORE: 65
ADLS_ACUITY_SCORE: 45
ORAL_HYGIENE: INDEPENDENT
ADLS_ACUITY_SCORE: 45
ADLS_ACUITY_SCORE: 46
ADLS_ACUITY_SCORE: 45
ADLS_ACUITY_SCORE: 65
ADLS_ACUITY_SCORE: 45
ADLS_ACUITY_SCORE: 65
HYGIENE/GROOMING: INDEPENDENT
ADLS_ACUITY_SCORE: 45
ADLS_ACUITY_SCORE: 46
ADLS_ACUITY_SCORE: 65
ADLS_ACUITY_SCORE: 65
ORAL_HYGIENE: INDEPENDENT
HYGIENE/GROOMING: INDEPENDENT
DRESS: INDEPENDENT;SCRUBS (BEHAVIORAL HEALTH)
ADLS_ACUITY_SCORE: 45
ADLS_ACUITY_SCORE: 65
DRESS: INDEPENDENT;SCRUBS (BEHAVIORAL HEALTH)
ADLS_ACUITY_SCORE: 46
ADLS_ACUITY_SCORE: 45
ADLS_ACUITY_SCORE: 65
ADLS_ACUITY_SCORE: 65
ADLS_ACUITY_SCORE: 45
ADLS_ACUITY_SCORE: 45

## 2024-08-13 NOTE — PLAN OF CARE
"Goal Outcome Evaluation:    Plan of Care Reviewed With: patient        Problem: Anxiety Signs/Symptoms  Goal: Optimized Energy Level (Anxiety Signs/Symptoms)  Outcome: Progressing  Intervention: Optimize Energy Level  Recent Flowsheet Documentation  Taken 8/12/2024 1700 by Omid Bennett RN  Activity (Behavioral Health): up ad ce       Patient was first seen  in her room laying in bed, presented with a flat affect but brightened with approach. Patient was cooperative with MI assessment, denies SI/SIB/HI, denies any hallucinations today, denies anxiety and depression. Patient reports that she was feeling much better compared to when they came. Patient was agreable to come out of her room and socialize with peers. Patient ate about 50% of her dinner and was observed to sitting in the lounge watching TV. Patient took her scheduled medications at 8pm and retired to bed. No prn was given.  Blood pressure 114/82, pulse 76, temperature 98.4  F (36.9  C), temperature source Oral, resp. rate 16, height 1.626 m (5' 4\"), weight 63.5 kg (140 lb), SpO2 100%.    "

## 2024-08-13 NOTE — PROGRESS NOTES
[facility], [place]   Psychiatric Progress Note        Interim History:   Team meeting report: The patient's care was discussed with the treatment team during the daily team meeting and/or staff's chart notes were reviewed.  Nursing staff reports the patient has been calm, pleasant, cooperative.  She has not been falling.  She has been denying auditory visual hallucinations, paranoia, delusions.  She has not been seen responding to internal stimuli.  The patient spent sometimes napping in her bed in the afternoon.  Denied all mental health symptoms.  She is feeling better.  She is eating 50%.  Med compliant.  Attended some groups.    Met with patient.  She is sleeping late morning.  States that she is feeling much better.  She continues to feel foggy and tired.  Reports some anxiety and sadness but denied everything else.  She is eating well.  Med compliant.  Discussed disposition.  She is aware that I will connect with her  tomorrow and will certainly make a final decision when she will discharge.  I still think that she will benefit from a small dose of Prozac.  The patient is agreeable but also wants her  to be on board with it.     presented [facility] paperwork for the  which was completed today.         Medications:     Current Facility-Administered Medications   Medication Dose Route Frequency Provider Last Rate Last Admin    levothyroxine (SYNTHROID/LEVOTHROID) tablet 112 mcg  [facility]   112 mcg at 08/13/24 0749    OLANZapine (zyPREXA) tablet 15 mg  15 mg Oral At Bedtime Radha Morin APRN CNP   15 mg at 08/12/24 2017    OXcarbazepine (TRILEPTAL) tablet 150 mg  150 mg Oral BID Radha Morin APRN CNP   150 mg at 08/13/24 0812    sennosides (SENOKOT) tablet 8.6 mg  8.6 mg Oral BID Radha Morin APRN CNP   8.6 mg at 08/13/24 0812    Vitamin D3 (CHOLECALCIFEROL) tablet 25 mcg  25  mcg Oral Daily Idalmis Jc PA   25 mcg at 08/13/24 0812            Allergies:     Allergies   Allergen Reactions    Pcn [Penicillins] Unknown     Has been told she was allergic since she was a child.             Labs:     Recent Results (from the past 672 hour(s))   Lithium level    Collection Time: 08/03/24  8:44 PM   Result Value Ref Range    Lithium 0.45 (L) 0.60 - 1.20 mmol/L   TSH with free T4 reflex    Collection Time: 08/03/24  8:44 PM   Result Value Ref Range    TSH 24.61 (H) 0.30 - 4.20 uIU/mL   T4 free    Collection Time: 08/03/24  8:44 PM   Result Value Ref Range    Free T4 0.82 (L) 0.90 - 1.70 ng/dL   TSH with free T4 reflex    Collection Time: 08/04/24  7:44 PM   Result Value Ref Range    TSH 29.64 (H) 0.30 - 4.20 uIU/mL   T4 free    Collection Time: 08/04/24  7:44 PM   Result Value Ref Range    Free T4 0.68 (L) 0.90 - 1.70 ng/dL   Comprehensive metabolic panel    Collection Time: 08/04/24  7:44 PM   Result Value Ref Range    Sodium 138 135 - 145 mmol/L    Potassium 4.0 3.4 - 5.3 mmol/L    Carbon Dioxide (CO2) 23 22 - 29 mmol/L    Anion Gap 11 7 - 15 mmol/L    Urea Nitrogen 11.7 6.0 - 20.0 mg/dL    Creatinine 0.79 0.51 - 0.95 mg/dL    GFR Estimate >90 >60 mL/min/1.73m2    Calcium 9.5 8.8 - 10.4 mg/dL    Chloride 104 98 - 107 mmol/L    Glucose 100 (H) 70 - 99 mg/dL    Alkaline Phosphatase 62 40 - 150 U/L    AST 21 0 - 45 U/L    ALT 18 0 - 50 U/L    Protein Total 6.6 6.4 - 8.3 g/dL    Albumin 4.5 3.5 - 5.2 g/dL    Bilirubin Total 0.2 <=1.2 mg/dL   CBC with platelets    Collection Time: 08/05/24  2:51 PM   Result Value Ref Range    WBC Count 6.0 4.0 - 11.0 10e3/uL    RBC Count 4.29 3.80 - 5.20 10e6/uL    Hemoglobin 13.3 11.7 - 15.7 g/dL    Hematocrit 38.8 35.0 - 47.0 %    MCV 90 78 - 100 fL    MCH 31.0 26.5 - 33.0 pg    MCHC 34.3 31.5 - 36.5 g/dL    RDW 11.9 10.0 - 15.0 %    Platelet Count 214 150 - 450 10e3/uL   Asymptomatic COVID-19 Virus (Coronavirus) by PCR Nasopharyngeal    Collection Time:  08/05/24  4:25 PM    Specimen: Nasopharyngeal; Swab   Result Value Ref Range    SARS CoV2 PCR Negative Negative   EKG 12-lead, complete    Collection Time: 08/07/24 11:29 AM   Result Value Ref Range    Systolic Blood Pressure  mmHg    Diastolic Blood Pressure  mmHg    Ventricular Rate 74 BPM    Atrial Rate 74 BPM    TX Interval 152 ms    QRS Duration 68 ms     ms    QTc 601 ms    P Axis 40 degrees    R AXIS 16 degrees    T Axis 51 degrees    Interpretation ECG       ** Poor data quality, interpretation may be adversely affected  Sinus rhythm  Non-specific ST & T wave changes in the lateral leads  Abnormal ECG  When compared with ECG of 20-Aug-2021 12:35,  There have been no significant changes  Confirmed by fellow Ezequiel Paz ([id number]) on 8/8/2024 9:43:01 AM  Confirmed by MD DOWNEY HENRI [id number]) on 8/8/2024 10:40:10 PM     EKG 12-lead, complete    Collection Time: 08/07/24  2:10 PM   Result Value Ref Range    Systolic Blood Pressure  mmHg    Diastolic Blood Pressure  mmHg    Ventricular Rate 75 BPM    Atrial Rate 75 BPM    TX Interval 158 ms    QRS Duration 70 ms     ms    QTc 424 ms    P Axis 41 degrees    R AXIS 30 degrees    T Axis 56 degrees    Interpretation ECG       Sinus rhythm with sinus arrhythmia  Septal infarct (cited on or before 20-Aug-2021)  Abnormal ECG  When compared with ECG of 07-Aug-2024 11:29, (unconfirmed)  QT has shortened  Confirmed by MD DOWNEY HENRI [id number]) on 8/8/2024 8:44:53 AM     HCG qualitative urine    Collection Time: 08/07/24  6:59 PM   Result Value Ref Range    hCG Urine Qualitative Negative Negative   Urine Drug Screen Panel    Collection Time: 08/07/24  6:59 PM   Result Value Ref Range    Amphetamines Urine Screen Negative Screen Negative    Barbituates Urine Screen Negative Screen Negative    Benzodiazepine Urine Screen Negative Screen Negative    Cannabinoids Urine Screen Negative Screen Negative    Cocaine Urine Screen Negative Screen Negative    Fentanyl Qual  Urine Screen Negative Screen Negative    Opiates Urine Screen Negative Screen Negative    PCP Urine Screen Negative Screen Negative   EKG 12-lead, complete    Collection Time: 08/08/24  8:59 AM   Result Value Ref Range    Systolic Blood Pressure  mmHg    Diastolic Blood Pressure  mmHg    Ventricular Rate 64 BPM    Atrial Rate 64 BPM    IA Interval 170 ms    QRS Duration 70 ms     ms    QTc 425 ms    P Axis 39 degrees    R AXIS 29 degrees    T Axis 52 degrees    Interpretation ECG       Sinus rhythm with sinus arrhythmia  Septal infarct (cited on or before 20-Aug-2021)  Abnormal ECG  When compared with ECG of 07-Aug-2024 14:10,  No significant change was found  Confirmed by MD PAMELLA, CHACHA [id number]) on 8/9/2024 10:32:34 AM     Glucose by meter    Collection Time: 08/08/24 12:26 PM   Result Value Ref Range    GLUCOSE BY METER POCT 111 (H) 70 - 99 mg/dL   EKG 12-lead, complete    Collection Time: 08/09/24  4:24 PM   Result Value Ref Range    Systolic Blood Pressure  mmHg    Diastolic Blood Pressure  mmHg    Ventricular Rate 77 BPM    Atrial Rate 77 BPM    IA Interval 156 ms    QRS Duration 78 ms     ms    QTc 495 ms    P Axis 41 degrees    R AXIS 27 degrees    T Axis 48 degrees    Interpretation ECG       Sinus rhythm  Septal infarct (cited on or before 20-Aug-2021)  Abnormal ECG  When compared with ECG of 08-Aug-2024 08:59,  Nonspecific T wave abnormality, improved in Inferior leads  Nonspecific T wave abnormality, improved in Anterolateral leads  QT has lengthened     Troponin T, High Sensitivity    Collection Time: 08/09/24  4:39 PM   Result Value Ref Range    Troponin T, High Sensitivity 16 (H) <=14 ng/L   Basic metabolic panel    Collection Time: 08/09/24  4:39 PM   Result Value Ref Range    Sodium 139 135 - 145 mmol/L    Potassium 3.6 3.4 - 5.3 mmol/L    Chloride 102 98 - 107 mmol/L    Carbon Dioxide (CO2) 22 22 - 29 mmol/L    Anion Gap 15 7 - 15 mmol/L    Urea Nitrogen 9.7 6.0 - 20.0 mg/dL     Creatinine 0.88 0.51 - 0.95 mg/dL    GFR Estimate 86 >60 mL/min/1.73m2    Calcium 9.9 8.8 - 10.4 mg/dL    Glucose 108 (H) 70 - 99 mg/dL   CBC with platelets    Collection Time: 08/09/24  4:39 PM   Result Value Ref Range    WBC Count 6.2 4.0 - 11.0 10e3/uL    RBC Count 4.35 3.80 - 5.20 10e6/uL    Hemoglobin 13.3 11.7 - 15.7 g/dL    Hematocrit 38.0 35.0 - 47.0 %    MCV 87 78 - 100 fL    MCH 30.6 26.5 - 33.0 pg    MCHC 35.0 31.5 - 36.5 g/dL    RDW 12.0 10.0 - 15.0 %    Platelet Count 196 150 - 450 10e3/uL   Troponin T, High Sensitivity    Collection Time: 08/09/24  6:49 PM   Result Value Ref Range    Troponin T, High Sensitivity 13 <=14 ng/L   EKG 12-lead, complete    Collection Time: 08/10/24  1:58 PM   Result Value Ref Range    Systolic Blood Pressure  mmHg    Diastolic Blood Pressure  mmHg    Ventricular Rate 69 BPM    Atrial Rate 69 BPM    ME Interval 150 ms    QRS Duration 68 ms     ms    QTc 456 ms    P Axis 39 degrees    R AXIS 33 degrees    T Axis 41 degrees    Interpretation ECG       Sinus rhythm  Anteroseptal infarct (cited on or before 20-Aug-2021)  Abnormal ECG  When compared with ECG of 09-Aug-2024 16:24, (unconfirmed)  No significant change was found  Confirmed by MD SHAYAN, LUIS ([id number]) on 8/12/2024 8:31:06 AM     Troponin T, High Sensitivity    Collection Time: 08/10/24  2:20 PM   Result Value Ref Range    Troponin T, High Sensitivity 12 <=14 ng/L   Extra Purple Top Tube    Collection Time: 08/10/24  2:20 PM   Result Value Ref Range    Hold Specimen JIC             Precautions:     Behavioral Orders   Procedures    Assault precautions    Cheeking Precautions (behavioral units)    Code 1 - Restrict to Unit    Elopement precautions    Fall precautions     Intake is low, pt almost fell over in the dining room and needed assistance from staff to sit down.    Routine Programming     As clinically indicated    Status 15     Every 15 minutes.    Suicide precautions: Suicide Risk: MODERATE; Clinical  rationale to override score: modification to the care environment     Patients on Suicide Precautions should have a Combination Diet ordered that includes a Diet selection(s) AND a Behavioral Tray selection for Safe Tray - with utensils, or Safe Tray - NO utensils       Order Specific Question:   Suicide Risk     Answer:   MODERATE     Order Specific Question:   Clinical rationale to override score:     Answer:   modification to the care environment            Psychiatric Examination:   Temp: 97.7  F (36.5  C) Temp src: Oral BP: 114/79 Pulse: 80   Resp: 16 SpO2: 99 % O2 Device: None (Room air)    Weight is 139 lbs 6.4 oz  Body mass index is 23.93 kg/m .    Appearance: awake, alert and adequately groomed  Attitude:  cooperative  Eye Contact:   labile  Mood:  anxious, depressed, and better  Affect:  mood congruent  Speech:  clear, coherent  Psychomotor Behavior:  no evidence of tardive dyskinesia, dystonia, or tics  Throught Process:  goal oriented  Associations:  no loose associations  Thought Content:  no evidence of suicidal ideation or homicidal ideation and paranoia, delusions  Insight:  fair  Judgement:  fair  Oriented to:  time, person, and place  Attention Span and Concentration:  limited  Recent and Remote Memory:  limited         Precautions:     Behavioral Orders   Procedures    Assault precautions    Cheeking Precautions (behavioral units)    Code 1 - Restrict to Unit    Elopement precautions    Fall precautions     Intake is low, pt almost fell over in the dining room and needed assistance from staff to sit down.    Routine Programming     As clinically indicated    Status 15     Every 15 minutes.    Suicide precautions: Suicide Risk: MODERATE; Clinical rationale to override score: modification to the care environment     Patients on Suicide Precautions should have a Combination Diet ordered that includes a Diet selection(s) AND a Behavioral Tray selection for Safe Tray - with utensils, or Safe Tray - NO  utensils       Order Specific Question:   Suicide Risk     Answer:   MODERATE     Order Specific Question:   Clinical rationale to override score:     Answer:   modification to the care environment          DIagnoses:   Bipolar affective disorder, current current episode mixed, severe, with psychosis  Breast cancer         Plan:   Medications:  -- Discontinue lithium due to hypothyroidism and poor fluid intake  -- Discontinue Seroquel, is not helpful  -- Start Zyprexa, change to 15 mg, at bedtime. Discontinue the morning dose.   -- Start Trileptal 150 mg, bid for mood stabilization.   -- May start Prozac to target the depression and anxiety  -- Additional medications will include hydroxyzine, Zyprexa, trazodone    Medical:  --Internal medicine to follow up for medical problems     --Lab work was reviewed.  Abnormal results include glucose 100, TSH 29.64, T4 0.68.  Pregnancy test was negative.    --Initial EKG shows QTc of 601.  Subsequent EKG shows a normal QTc.      Others:  --Intake and output  --Calorie counts  --Boost, chocolate flavor    --Discontinue SIO    --Care was coordinated with the treatment team.   --The patient was consulted on nature of illness and treatment options.      Disposition Plan   Reason for ongoing admission: is unable to care for self due to severe psychosis or manuela  Discharge location: home with family  Discharge Medications: not ordered  Follow-up Appointments: not scheduled  Legal Status:     Initially on 72 hours hold.  Petition was started in the emergency room.  The petition was not supported by the court.  The appeal was also rejected as well.  The patient is now voluntary.  Discharge will be granted once symptoms improved.    Contacts:  Family/Friends:  Spouse - Daniel Lyn (phone: [phone]) - LAWANDA obtained 8/4/2024  Sister - Selina Bartlett (phone: [phone])     Outpatient Providers:  Primary Care Provider - Marilyn Caruso PA-C of [facility]  (phone: [phone])  [facility] Pre-Petition Screener - Ezequiel West (phone: [phone], email: [email or web address].)?     Radha CLAUDIO, CNP    This note was created with the help of Dragon dictation system. All grammatical/typing errors or context distortion are unintentional and inherent to software.

## 2024-08-13 NOTE — PLAN OF CARE
Problem: Adult Behavioral Health Plan of Care  Goal: Develops/Participates in [facility] [place] to Support Successful Transition  Outcome: Progressing  Intervention: Foster Therapeutic [place]  Recent Flowsheet Documentation  Taken 8/13/2024 3625 by Yessenia Castro RN  Trust Relationship/Rapport:   care explained   choices provided   emotional support provided   empathic listening provided   questions answered   questions encouraged   reassurance provided   thoughts/feelings acknowledged     Problem: Psychotic Signs/Symptoms  Goal: Improved Behavioral Control (Psychotic Signs/Symptoms)  Outcome: Progressing   Goal Outcome Evaluation:    Plan of Care Reviewed With: patient      Patient was awake and sitting in the lounge watching tv by 0745.They ate 50% of breakfast and continued to watch tv when finished until shortly after 0900. Patient walked around the unit and then returned to her room. They appeared to be sleeping around 1000.    Patient reported that their level of anxiety and depression both a 4/10. Patient denied SI, HI and hallucinations. She contracted for safety. Patients affect is flat but she is pleasant and cooperative upon approach. Patient spent time in the later morning reading in her room. She left her room at 1215 and had lunch in the lounge. She ate 100% of her meal.    Patient attended part of a group in the afternoon and sat in the dining room looking at magazines.    Precautions: Assault, Cheeking, Elopement, Falls and Suicide

## 2024-08-13 NOTE — PROGRESS NOTES
NOC Shift Report     Pt in bed at beginning of shift, breathing quiet and unlabored. Pt slept through the shift for 7 hours. No pt complaints or concerns at this time.   No PRNs given. Pt remains on every 15 minutes safety checks. Will continue to monitor.

## 2024-08-13 NOTE — PROGRESS NOTES
Rehab Group    Start time: 10:15  End time: 11:15  Patient time total: 55 minutes    attended full group    #5 attended   Group Type: OT Clinic   Group Topic Covered: balanced lifestyle, coping skills, healthy leisure time, and social skills     Group Session Detail:  Pt actively participated in occupational therapy clinic to facilitate coping skill exploration, creative expression within personally meaningful activities, and clinical observation of social, cognitive, and kinesthetic performance skills.      Patient Response/Contribution:  cooperative with task, attentive, Limited eye contact, and withdrawn     Patient Detail:    Patient presented to group with a more organized affect than last week, however she still presented as withdrawn and anxious-appearing. Patient was independent to pull out art materials to work with, independent to sequence and plan her project, but chose to sit by the window at a solo table and work quietly for the duration. Patient did respond if prompted but was softspoken and responses were limited. Socially withdrawn, but focused well on her project. Productive, completed a painting.         [id number] OT Group (2 or more in attendance)      Patient Active Problem List   Diagnosis    Mental health disorder    Sleep deprivation    Bipolar 1 disorder (H)    Bipolar disorder, current episode depressed, severe, with psychotic features (H)    Suicide attempt by substance overdose (H)    Insomnia, unspecified type

## 2024-08-13 NOTE — PLAN OF CARE
Care Coordinator scheduled the following appointments:    Individual Therapy appointment: August 21, 2024 @ 9am via Telehealth  Provider: Slime Wan MS, [facility]  Address: [facility], [address] [place] [address]   Phone: [phone]  Fax: [phone]     Psychiatry appointment: Monday, September 18, 2024 @ 10:40am In-person   Provider: Gustavo Keene MD  Location: [address] [address]  Phone: [phone]  Fax: [phone]   Notes: This is the soonest available appointment with your provider, but it's past the 30-day window for medication renewal. Please call the clinic within 2 weeks of discharge to renew medications. Please also verify the address for your appointment.   HUC TO FAX AVS to above appointment, please     CC updated CTC and AVS

## 2024-08-13 NOTE — PROGRESS NOTES
"  OT Individual Check In    Patient time total: 10 minutes    Occupational Therapy Wellness Plan     Detail:   Patient completed an occupational therapy Wellness Plan, to pull together personal coping skills and wellness ideas in preparation for a safe discharge from the hospital. Patient was encouraged to complete this handout thoroughly and keep it in a safe place at home (ie folder, fridge, or show it to a personal support person) so that they can reference this plan in the future to maintain wellness and work towards goals after leaving the hospital.      Patient Detail:    Patient was independent to complete this plan. She focused well on completing the handout independently and showed OT the completed worksheet after about 15 minutes. Patient verbalized an understanding of the plan.     Wellness Toolbox:  Patient identified their wellness toolbox to include \"therapy, planned relaxation time, and an evening routine for sleep.\" She identified wanting to try nutrition and supplements to support her wellness. She identified primary hobbies to be yoga and walking/running, and primary self care practices to be magnesium baths and time for herself. Pt was able to report benefits of her medications to be helpful in \"maintaining health and routine.\"    Maintenance Plan: Goals and Values:  Patient identified her goals post discharge to be finding \"healthy habits, exploring my artistic side (fashion, interior design, writing, and entrepreneurship), and keeping appointments.\" She identified her values to be \"wellness, love others, and take only what I need\". She described herself when she is well to be \"happy and glowing, enthusiastic.\"     Recognition and Prevention:   Patient recognized triggers of \"social and emotional issues that I absorb from others at times of conflict\" and reports a plan to cope with triggers to be utilizing counseling. She reports her ideal environment for calming and coping to be, \"bed, bean bag, " "yoga mat, cook, clean, walk around lakes, garden, art projects, and hugging my cat.\" She identified early warning signs to be \"not able to think or sleep.\"     Safety Action Plan:  In the event of worsening mental health, patient identified an action plan of utilizing her social support team. She identified that it would be helpful if she could be honest with them about how she's feeling and they can be supporting by listening and understanding her background.         No Charge      Patient Active Problem List   Diagnosis    Mental health disorder    Sleep deprivation    Bipolar 1 disorder (H)    Bipolar disorder, current episode depressed, severe, with psychotic features (H)    Suicide attempt by substance overdose (H)    Insomnia, unspecified type       "
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
## 2024-08-13 NOTE — PLAN OF CARE
BEH IP Unit Acuity Rating Score (UARS)  Patient is given one point for every criteria they meet.    CRITERIA SCORING   On a 72 hour hold, court hold, committed, stay of commitment, or revocation. 0    Patient LOS on BEH unit exceeds 20 days. 0  LOS: 7   Patient under guardianship, 55+, otherwise medically complex, or under age 11. 1   Suicide ideation without relief of precipitating factors. 0   Current plan for suicide. 0   Current plan for homicide. 0   Imminent risk or actual attempt to seriously harm another without relief of factors precipitating the attempt. 0   Severe dysfunction in daily living (ex: complete neglect for self care, extreme disruption in vegetative function, extreme deterioration in social interactions). 1   Recent (last 7 days) or current physical aggression in the ED or on unit. 0   Restraints or seclusion episode in past 72 hours. 0   Recent (last 7 days) or current verbal aggression, agitation, yelling, etc., while in the ED or unit. 1   Active psychosis. 1   Need for constant or near constant redirection (from leaving, from others, etc).  1   Intrusive or disruptive behaviors. 0   Patient requires 3 or more hours of individualized nursing care per 8-hour shift (i.e. for ADLs, meds, therapeutic interventions). 0   TOTAL 5

## 2024-08-14 PROCEDURE — 250N000013 HC RX MED GY IP 250 OP 250 PS 637: Performed by: PHYSICIAN ASSISTANT

## 2024-08-14 PROCEDURE — 250N000013 HC RX MED GY IP 250 OP 250 PS 637: Performed by: NURSE PRACTITIONER

## 2024-08-14 PROCEDURE — 99238 HOSP IP/OBS DSCHRG MGMT 30/<: CPT | Performed by: NURSE PRACTITIONER

## 2024-08-14 RX ORDER — OLANZAPINE 10 MG/1
10 TABLET ORAL AT BEDTIME
Status: DISCONTINUED | OUTPATIENT
Start: 2024-08-14 | End: 2024-08-14 | Stop reason: HOSPADM

## 2024-08-14 RX ORDER — FLUOXETINE 10 MG/1
10 CAPSULE ORAL DAILY
Qty: 30 CAPSULE | Refills: 1 | Status: SHIPPED | OUTPATIENT
Start: 2024-08-14

## 2024-08-14 RX ORDER — OLANZAPINE 15 MG/1
15 TABLET ORAL AT BEDTIME
Qty: 30 TABLET | Refills: 0 | Status: SHIPPED | OUTPATIENT
Start: 2024-08-14 | End: 2024-08-14

## 2024-08-14 RX ORDER — OXCARBAZEPINE 150 MG/1
150 TABLET, FILM COATED ORAL 2 TIMES DAILY
Qty: 60 TABLET | Refills: 0 | Status: SHIPPED | OUTPATIENT
Start: 2024-08-14

## 2024-08-14 RX ORDER — OLANZAPINE 10 MG/1
10 TABLET ORAL AT BEDTIME
Qty: 30 TABLET | Refills: 1 | Status: SHIPPED | OUTPATIENT
Start: 2024-08-14

## 2024-08-14 RX ORDER — SENNOSIDES 8.6 MG
1 TABLET ORAL 2 TIMES DAILY
Qty: 60 TABLET | Refills: 0 | Status: SHIPPED | OUTPATIENT
Start: 2024-08-14

## 2024-08-14 RX ORDER — FLUOXETINE 10 MG/1
10 CAPSULE ORAL DAILY
Status: DISCONTINUED | OUTPATIENT
Start: 2024-08-14 | End: 2024-08-14 | Stop reason: HOSPADM

## 2024-08-14 RX ORDER — OLANZAPINE 10 MG/1
10 TABLET ORAL AT BEDTIME
Qty: 30 TABLET | Refills: 0 | Status: SHIPPED | OUTPATIENT
Start: 2024-08-14 | End: 2024-08-14

## 2024-08-14 RX ORDER — FLUOXETINE 10 MG/1
10 CAPSULE ORAL DAILY
Qty: 30 CAPSULE | Refills: 0 | Status: SHIPPED | OUTPATIENT
Start: 2024-08-14 | End: 2024-08-14

## 2024-08-14 RX ADMIN — Medication 25 MCG: at 09:26

## 2024-08-14 RX ADMIN — OXCARBAZEPINE 150 MG: 150 TABLET, FILM COATED ORAL at 09:26

## 2024-08-14 RX ADMIN — LEVOTHYROXINE SODIUM 112 MCG: 112 TABLET ORAL at 06:55

## 2024-08-14 RX ADMIN — SENNOSIDES 8.6 MG: 8.6 TABLET, FILM COATED ORAL at 09:26

## 2024-08-14 RX ADMIN — FLUOXETINE HYDROCHLORIDE 10 MG: 10 CAPSULE ORAL at 11:16

## 2024-08-14 ASSESSMENT — ACTIVITIES OF DAILY LIVING (ADL)
ADLS_ACUITY_SCORE: 46
ORAL_HYGIENE: INDEPENDENT
DRESS: SCRUBS (BEHAVIORAL HEALTH);INDEPENDENT
ADLS_ACUITY_SCORE: 46
HYGIENE/GROOMING: INDEPENDENT
ADLS_ACUITY_SCORE: 46
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
## 2024-08-14 NOTE — PLAN OF CARE
Problem: Psychotic Signs/Symptoms  Goal: Improved Sleep (Psychotic Signs/Symptoms)  Outcome: Progressing  Flowsheets (Taken 8/14/2024 5807)  Mutually Determined Action Steps (Improved Sleep): sleeps 4-6 hours at night   Goal Outcome Evaluation:         Pt appeared to sleep for a total of 6.5 hours overnight with even and non-labored breathing observed during q 15 minutes safety check. No PRN medications were administered, and no concerns were noted.

## 2024-08-14 NOTE — PLAN OF CARE
BEH IP Unit Acuity Rating Score (UARS)  Patient is given one point for every criteria they meet.    CRITERIA SCORING   On a 72 hour hold, court hold, committed, stay of commitment, or revocation. 0    Patient LOS on BEH unit exceeds 20 days. 0  LOS: 8   Patient under guardianship, 55+, otherwise medically complex, or under age 11. 1   Suicide ideation without relief of precipitating factors. 0   Current plan for suicide. 0   Current plan for homicide. 0   Imminent risk or actual attempt to seriously harm another without relief of factors precipitating the attempt. 0   Severe dysfunction in daily living (ex: complete neglect for self care, extreme disruption in vegetative function, extreme deterioration in social interactions). 1   Recent (last 7 days) or current physical aggression in the ED or on unit. 0   Restraints or seclusion episode in past 72 hours. 0   Recent (last 7 days) or current verbal aggression, agitation, yelling, etc., while in the ED or unit. 1   Active psychosis. 1   Need for constant or near constant redirection (from leaving, from others, etc).  0   Intrusive or disruptive behaviors. 0   Patient requires 3 or more hours of individualized nursing care per 8-hour shift (i.e. for ADLs, meds, therapeutic interventions). 0   TOTAL 4

## 2024-08-14 NOTE — DISCHARGE SUMMARY
Psychiatric Discharge Summary    Fiorella Bartlett MRN# [id number]   Age: 38 year old YOB: 1985     Date of Admission:  8/6/2024  Date of Discharge:  8/14/2024  Admitting Physician:  Ronna Murdock MD  Discharge Physician:  SHANON Hopper CNP          Event Leading to Hospitalization:   This patient is a 38 year old female with history of history of depression (including psychotic depression dating back to 2015), bipolar I disorder, sleep deprivation, eating disorder, and brief reactive psychosis, and breast cancer  who presented to ED with symptoms of manuela in context of hypothyroidism, subtherapeutic dose of Lithium, dose reductions in psychotropic medications, chemotherapy, and recent return to work. Pt reports that Seroquel and Lithium Symptoms and presentation at this time is most consistent with Bipolar Disorder, Type I, currently mixed manuela. I have discussed the risks, benefits, and alternatives of PTA medication regimen. I spoke with PharmD regarding Lithium use in setting of hypothyroidism. Pt reported that she had missed a couple of doses of Synthroid recently, though per PharmD recs, will consult with IM to ensure she is on an adequate dose of Synthroid while also optimizing Lithium to treat current symptoms. However, due to prolonged QTc discovered today, will hold off on an increase in Lithium until repeat EKG in the AM. I discussed results of EKG with cardiology team who recommends repeat EKG in AM (QTc was prolonged on first EKG today then normalized on second EKG, but per cardiology, normalization of QTc seen on EKG may not be accurate given evidence of baseline artifact). Cardiology is also recommending holding Seroquel until repeat EKG in the AM. Petition for MI commitment and Houser were filed in ED. [facility] did NOT support petition for commitment, however, due to patient's ongoing SI, guardedness, severe paranoia and depressive symptoms, unwillingness to  formulate a safety plan at this time, and prolonged QTC/need to temporarily hold Seroquel, writer is appealing this decision and we are currently awaiting outcome of appeal. Patient will likely benefit from increased MI supports upon discharge. Inpatient psychiatric hospitalization is warranted at this time for safety, stabilization, and possible adjustment in medications.        See Admission note by Ronna Murdock MD  for additional details.          Diagnoses:     Bipolar affective disorder, current episode mixed, severe, with psychosis  Breast cancer         Labs:        Lab Results   Component Value Date     08/09/2024     02/04/2019    Lab Results   Component Value Date    CHLORIDE 102 08/09/2024    CHLORIDE 108 08/20/2021    CHLORIDE 107 02/04/2019    Lab Results   Component Value Date    BUN 9.7 08/09/2024    BUN 9 08/20/2021    BUN 12 02/04/2019      Lab Results   Component Value Date    POTASSIUM 3.6 08/09/2024    POTASSIUM 3.7 08/20/2021    POTASSIUM 4.2 02/04/2019    Lab Results   Component Value Date    CO2 22 08/09/2024    CO2 25 08/20/2021    CO2 27 02/04/2019    Lab Results   Component Value Date    CR 0.88 08/09/2024    CR 0.80 02/04/2019          Lab Results   Component Value Date    WBC 6.2 08/09/2024    HGB 13.3 08/09/2024    HCT 38.0 08/09/2024    MCV 87 08/09/2024     08/09/2024     Lab Results   Component Value Date    AST 21 08/04/2024    ALT 18 08/04/2024    ALKPHOS 62 08/04/2024    BILITOTAL 0.2 08/04/2024     Lab Results   Component Value Date    TSH 29.64 (H) 08/04/2024            Consults:   Consultation during this admission received from internal medicine         Hospital Course:   Fiorella Bartlett was admitted to [facility] with attending Radha CLAUDIO CNP, on a 72 hour mental health hold, but patient subsequently signed in voluntarily. The patient was placed under status 15 (15 minute checks) to ensure patient safety.     The patient tolerated  medications well. Reported mood symptoms improved.  The patient was floridly psychotic when she first came in.  She was responding to internal stimuli.  The psychosis resolved few days prior to discharge.  The patient appeared to be quite depressed.  She was started on Prozac on the day of discharge.  The patient reported some depression and anxiety.  Denies everything else.  Initially isolate her room, later was active on the unit. The patient was minimally social with peers and staff.  No overt manuela, confusion or psychosis noted. The patient maintained denial of SI, HI and MAURICIO. Future oriented, feeling hopeful for the future. The patient slept well. Appetite was intact. The patient was compliant with medications and care.     Spoke with the patient's  Daniel on the day of discharge.  He feels comfortable taking patient home today.  [facility] paperwork for him was filled.  Medications were sent to her pharmacy.  Daniel is concerned about starting an antidepressant considering the history of manuela previously.  Overall give it a try.  He visited yesterday and states the patient looked much better.  He does not have concerns about her safety.    Fiorella Bartlett was released to home. At the time of this encounter, Fiorella Bartlett was determined to not be a danger to herself or others and symptoms did not meet criteria for involuntary hospitalization.      Safety plan, post discharge recommendations and relapse prevention were discussed with the patient. The patient agreed to call 911 or present to ED if symptoms worsen or developed thoughts of suicide, self harm or homicide.  The patient agreed to continue medications and outpatient care.         Discharge Medications:     Current Discharge Medication List        START taking these medications    Details   FLUoxetine (PROZAC) 10 MG capsule Take 1 capsule (10 mg) by mouth daily  Qty: 30 capsule, Refills: 1    Associated Diagnoses: Bipolar 1 disorder (H)       hydrOXYzine HCl (ATARAX) 25 MG tablet Take 1 tablet (25 mg) by mouth 3 times daily as needed for anxiety  Qty: 30 tablet, Refills: 0    Associated Diagnoses: Bipolar disorder, current episode depressed, severe, with psychotic features (H)      OLANZapine (ZYPREXA) 10 MG tablet Take 1 tablet (10 mg) by mouth at bedtime  Qty: 30 tablet, Refills: 1    Associated Diagnoses: Bipolar 1 disorder (H)      OXcarbazepine (TRILEPTAL) 150 MG tablet Take 1 tablet (150 mg) by mouth 2 times daily  Qty: 60 tablet, Refills: 0    Associated Diagnoses: Bipolar 1 disorder (H)      sennosides (SENOKOT) 8.6 MG tablet Take 1 tablet by mouth 2 times daily  Qty: 60 tablet, Refills: 0    Associated Diagnoses: Constipation, unspecified constipation type           CONTINUE these medications which have CHANGED    Details   levothyroxine (SYNTHROID/LEVOTHROID) 112 MCG tablet Take 1 tablet (112 mcg) by mouth every morning (before breakfast)  Qty: 30 tablet, Refills: 0    Associated Diagnoses: Hypothyroidism, unspecified type      QUEtiapine (SEROQUEL) 100 MG tablet Take 1-2 tablets (100-200 mg) by mouth at bedtime  Qty: 60 tablet, Refills: 0    Associated Diagnoses: Bipolar disorder, current episode depressed, severe, with psychotic features (H)           CONTINUE these medications which have NOT CHANGED    Details   Cholecalciferol (VITAMIN D3 PO) Take 1 tablet by mouth daily            STOP taking these medications       lithium (ESKALITH CR/LITHOBID) 450 MG CR tablet Comments:   Reason for Stopping:                    Psychiatric Examination:   Appearance:  awake, alert and adequately groomed  Attitude:  cooperative  Eye Contact:  good  Mood:  depressed and better  Affect:  appropriate and in normal range  Speech:  clear, coherent  Psychomotor Behavior:  no evidence of tardive dyskinesia, dystonia, or tics  Thought Process:  logical and goal oriented  Associations:  no loose associations  Thought Content:  no evidence of suicidal ideation or  homicidal ideation  Insight:  fair  Judgment:  fair  Oriented to:  time, person, and place  Attention Span and Concentration:  intact  Recent and Remote Memory:  intact  Language: Able to name objects  Fund of Knowledge: appropriate  Muscle Strength and Tone: normal  Gait and Station: Normal         Discharge Plan:   The following medication changes took place:    -- Discontinue lithium due to hypothyroidism and poor fluid intake  -- Discontinue Seroquel, is not helpful  -- Start Zyprexa, change to 15 mg, at bedtime. Discontinue the morning dose.   -- Start Trileptal 150 mg, bid for mood stabilization.   -- Start Prozac 10 mg, qam to target depression and anxiety    Follow up with your outpatient provider/team.  --Lab work was reviewed.  Abnormal results include glucose 100, TSH 29.64, T4 0.68.  Pregnancy test was negative.     --Initial EKG shows QTc of 601.  Subsequent EKG shows a normal QTc.      Attestation:  The patient has been seen and evaluated by me,  Radha CLAUDIO, CNP
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
## 2024-08-14 NOTE — PLAN OF CARE
Problem: Adult Behavioral Health Plan of Care  Goal: Plan of Care Review  Outcome: Adequate for Care Transition  Flowsheets (Taken 8/14/2024 1126)  Patient Agreement with Plan of Care: agrees     Pt discharged to home with services via transport from  at 1345 today.  Prior to discharge, pt was A&O x 4 and ambulating on their own.  They were discharged with all of their belongings, including personal property from locker on unit.  Pt did not have any items in security.  Pt's medications are being sent to her private pharmacy.  Rn writer reviewed AVS and sent pt with a copy of discharge paperwork.  They were escorted off of the unit by psych associate to the entrance of the [facility] where they witnessed them depart in their transportation vehicle.

## 2024-08-14 NOTE — PLAN OF CARE
"  Problem: Adult Behavioral Health Plan of Care  Goal: Adheres to Safety Considerations for Self and Others  Outcome: Progressing  Intervention: Develop and Maintain Individualized Safety Plan  Recent Flowsheet Documentation  Taken 8/13/2024 2047 by Alma Fernández RN  Safety Measures:   clinical history reviewed   safety rounds completed   suicide check-in completed   Goal Outcome Evaluation:    Plan of Care Reviewed With: patient          Patient was visible in the lounge, socially withdrawn to self. Patient presents with a flat affect, calm, somber mood. Patient endorsed anxiety, denied SDI, HI, SIB, AVH. Patient denied pain, speech is slow and very quiet. VSS. Insight and judgment is limited and the patient is able to make her needs known.     Patient had an adequate oral intake, no complains of bowel and bladder issues. Patient was medication compliant, no side effects of medication was reported or observed.     Patient has remained safe on the unit, no agitation, behavioral outbursts or acute safety concerns. Family visited this shift and that seemed to go well. Staff will continue to follow the plan of care.     /71 (BP Location: Right arm, Patient Position: Sitting, Cuff Size: Adult Regular)   Pulse 81   Temp 98.5  F (36.9  C) (Oral)   Resp 16   Ht 1.626 m (5' 4\")   Wt 63.2 kg (139 lb 6.4 oz)   SpO2 98%   BMI 23.93 kg/m             "

## 2024-08-15 DIAGNOSIS — Z09 HOSPITAL DISCHARGE FOLLOW-UP: ICD-10-CM

## 2025-01-12 ENCOUNTER — HEALTH MAINTENANCE LETTER (OUTPATIENT)
Age: 40
End: 2025-01-12

## 2025-01-17 ENCOUNTER — MEDICAL CORRESPONDENCE (OUTPATIENT)
Dept: HEALTH INFORMATION MANAGEMENT | Facility: CLINIC | Age: 40
End: 2025-01-17
Payer: COMMERCIAL

## 2025-02-17 NOTE — PROGRESS NOTES
Facial Plastic and Reconstructive Surgery Follow up     HPI:  I had the pleasure of seeing Fiorella Bartlett today for nasal obstruction. I last saw her on 5/21/24 for nasal obstruction and cosmetic changes to her nose. She was undergoing breast cancer treatment at the time and I wanted her to get through that before we entertained a nasal surgery.   Fiorella Bartlett is a 39 year old female and is back today for follow up.  She has completed her treatment and has her final reconstructive procedure next month.  She continues to have right greater than left nasal obstruction despite optimal medical management.  She has tried Flonase for 3 months consistently without improvement in her breathing.  She has not had bloody noses.  She is never had surgery on her nose before.      Review Of Systems  ROS: 10 point ROS neg other than the symptoms noted above in the HPI.    Patient Active Problem List   Diagnosis    Mental health disorder    Sleep deprivation    Bipolar 1 disorder (H)    Bipolar disorder, current episode depressed, severe, with psychotic features (H)    Suicide attempt by substance overdose (H)    Insomnia, unspecified type     Past Surgical History:   Procedure Laterality Date    IR CHEST PORT PLACEMENT > 5 YRS OF AGE  11/7/2023     Current Outpatient Medications   Medication Sig Dispense Refill    Cholecalciferol (VITAMIN D3 PO) Take 1 tablet by mouth daily       FLUoxetine (PROZAC) 10 MG capsule Take 1 capsule (10 mg) by mouth daily 30 capsule 1    hydrOXYzine HCl (ATARAX) 25 MG tablet Take 1 tablet (25 mg) by mouth 3 times daily as needed for anxiety 30 tablet 0    levothyroxine (SYNTHROID/LEVOTHROID) 112 MCG tablet Take 1 tablet (112 mcg) by mouth every morning (before breakfast) 30 tablet 0    OLANZapine (ZYPREXA) 10 MG tablet Take 1 tablet (10 mg) by mouth at bedtime 30 tablet 1    OXcarbazepine (TRILEPTAL) 150 MG tablet Take 1 tablet (150 mg) by mouth 2 times daily 60 tablet 0    QUEtiapine (SEROQUEL)  100 MG tablet Take 1-2 tablets (100-200 mg) by mouth at bedtime 60 tablet 0    sennosides (SENOKOT) 8.6 MG tablet Take 1 tablet by mouth 2 times daily 60 tablet 0     Pcn [penicillins]  Social History     Socioeconomic History    Marital status:      Spouse name: Not on file    Number of children: Not on file    Years of education: Not on file    Highest education level: Not on file   Occupational History    Not on file   Tobacco Use    Smoking status: Never    Smokeless tobacco: Never   Substance and Sexual Activity    Alcohol use: No    Drug use: No    Sexual activity: Not on file   Other Topics Concern    Parent/sibling w/ CABG, MI or angioplasty before 65F 55M? Not Asked   Social History Narrative    The patient was raised in [place].    She is one of 2 siblings, and was raised by both parents.    Trauma history: none.    The patient is  and has no children.    The patient's social support system includes significant other.    She lives with her significant other.     She completed a college degree and did not participate in special education classes.     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 Past work history includes working for [employer].    She has not had involvement with the legal system.    She has not served in the .    She does not have access to firearms.    The patient reports the following spiritual and/or cultural history: None.    The patient reports the following hobbies, interests, and leisure activities: Running, reading, cooking, making soap.                 Social Drivers of Health     Financial Resource Strain: Not At Risk (4/24/2023)    Received from [facility]    Financial Resource Strain     Is it hard for you to pay for the very basics like food, housing, medical care or heating?: No   Food Insecurity: No Food Insecurity (5/24/2024)    Received from [facility]    Hunger Vital Sign     Worried About Running Out of Food in  the Last Year: Never true     Ran Out of Food in the Last Year: Never true   Transportation Needs: No Transportation Needs (5/24/2024)    [facility]    PRAPARE - Transportation     Lack of Transportation (Medical): No     Lack of Transportation (Non-Medical): No   Physical Activity: Not on file   Stress: Not on file   Social Connections: Not on file   Interpersonal Safety: Unknown (5/24/2024)    [facility]    Humiliation, Afraid, Rape, and Kick questionnaire     Fear of Current or Ex-Partner: Not on file     Emotionally Abused: Not on file     Physically Abused: No     Sexually Abused: No   Housing Stability: Low Risk  (5/24/2024)    Received from [facility]    Housing Stability Vital Sign     Unable to Pay for Housing in the Last Year: No     Number of Places Lived in the Last Year: 1     In the last 12 months, was there a time when you did not have a steady place to sleep or slept in a shelter (including now)?: No     Family History   Problem Relation Age of Onset    Anxiety Disorder Sister        PE:  Alert and Oriented, Answering Questions Appropriately  Atraumatic, Normocephalic, Face Symmetric  Skin: Bonilla 2, Skin Thickness: Medium  Facial Nerve Intact and facial movement symmetric  EOMI  Nasal Exam: On frontal view, her nasal bones are deviated to the right.  She also has obvious dorsal deviation to the right narrowing her internal nasal valves.  Her caudal septum is completely off of the crest into the right nostril and her tip is deviated to the left.  Anterior rhinoscopy reveals pink and healthy mucosa but a severely deviated right caudal septum and bilateral inferior turbinate hypertrophy.  She has not significant improvement in breathing with the modified Rachel maneuver.  Chin: Normal   Lips/Teeth/Toungue/Gums: Lips intact  Neck: trachea midline  Chest: No wheezing, cyanosis, or stridor  Card: Not diaphoretic  Neuro/Psych: CN's 2-12 intact, Moves all  extremities, ambulation in intact, positive affect, no notable muscle weakness              IMPRESSION/PLAN: Fiorella Bartlett is a 39 year old female here for evaluation of nasal obstruction despite optimal medical management..  She has completed her breast cancer treatment and continues to have right greater than left nasal obstruction.  On examination, she has nasal bone and nasal dorsal deviation to the right along with a caudal septal deflection to the right, her tip is deviated to the left, she has bilateral internal nasal valve collapse, and bilateral inferior turbinate hypertrophy.  Septoplasty alone would not improve her breathing.  In order to improve her breathing she needs an open septorhinoplasty with caudal septal reconstruction and repositioning, caudal septal extension graft, bilateral  grafts, osteotomies, and inferior turbinate reduction. Risks and benefits of the procedure  were discussed in depth with the patient including, but not limited to, asymmetry, septal perforation, postoperatively bleeding, irregularities, need for additional procedures, and infection.     Photodocumentation was obtained.

## 2025-02-18 ENCOUNTER — OFFICE VISIT (OUTPATIENT)
Dept: PLASTIC SURGERY | Facility: CLINIC | Age: 40
End: 2025-02-18
Payer: COMMERCIAL

## 2025-02-18 DIAGNOSIS — J34.89 NASAL OBSTRUCTION: Primary | ICD-10-CM

## 2025-02-19 ENCOUNTER — PREP FOR PROCEDURE (OUTPATIENT)
Dept: OTOLARYNGOLOGY | Facility: CLINIC | Age: 40
End: 2025-02-19

## 2025-02-19 DIAGNOSIS — J34.8201 INTERNAL NASAL VALVE COLLAPSE, STATIC: ICD-10-CM

## 2025-02-19 DIAGNOSIS — J34.2 NASAL SEPTAL DEVIATION: ICD-10-CM

## 2025-02-19 DIAGNOSIS — J34.3 HYPERTROPHY OF BOTH INFERIOR NASAL TURBINATES: ICD-10-CM

## 2025-02-19 DIAGNOSIS — J34.89 NASAL OBSTRUCTION: Primary | ICD-10-CM

## 2025-02-19 RX ORDER — CLINDAMYCIN PHOSPHATE 900 MG/50ML
900 INJECTION, SOLUTION INTRAVENOUS
OUTPATIENT
Start: 2025-02-19

## 2025-02-19 RX ORDER — CLINDAMYCIN PHOSPHATE 900 MG/50ML
900 INJECTION, SOLUTION INTRAVENOUS SEE ADMIN INSTRUCTIONS
OUTPATIENT
Start: 2025-02-19

## 2025-03-19 ENCOUNTER — TELEPHONE (OUTPATIENT)
Dept: PLASTIC SURGERY | Facility: CLINIC | Age: 40
End: 2025-03-19

## 2025-03-19 NOTE — TELEPHONE ENCOUNTER
Patient has been scheduled for surgery with Dr. Hanley.     Spoke with: Patient     Location: [facility]     Date: 6/12/2025    H&P: Patient is aware she will need an H&P within 30 days of scheduled surgery date.     Surgery Packet: Will mail out 2 months before surgery.     Cheyenne Zhou, Surgical Coordinator

## 2025-05-05 NOTE — DISCHARGE INSTRUCTIONS
" Behavioral Discharge Planning and Instructions      Summary:  You were admitted on 2/3/2019  due to Psychotic Symptomology.  You were treated by Dr. Shon Hills MD and discharged on from [facility] on 2/5/2019.     Principal Diagnosis:   Per Dr. Reinier MD  1.  Brief psychotic episode consistent with picture of delirium.   2.  History of depression.     Health Care Follow-up Appointments:   Psychiatrist: Dr. Roxy Yi @ [facility] [address] [address] Phone: [phone]  Next appointment: March 18th, 2019 @ 9:20 AM    [facility] [address], [place] [address] Phone: [phone]  Therapist: \"Betsy\"  You have declined assistance making this appointment. Please call [facility] at the phone number listed above and make an appointment with your therapist. It is typically recommended to see your therapist within 1 week of discharge.     Pharmacy  Please  your medications at: [id number] [address]     Attend all scheduled appointments with your outpatient providers. Call at least 24 hours in advance if you need to reschedule an appointment to ensure continued access to your outpatient providers.   Major Treatments, Procedures and Findings:  You were provided with: a psychiatric assessment, assessed for medical stability, medication evaluation and/or management, group therapy and milieu management.    Symptoms to Report: feeling more aggressive, increased confusion, losing more sleep, mood getting worse or thoughts of suicide    Early warning signs can include: increased depression or anxiety sleep disturbances increased thoughts or behaviors of suicide or self-harm  increased unusual thinking, such as paranoia or hearing voices    Safety and Wellness:  Take all medicines as directed.  Make no changes unless your doctor suggests them.      Follow treatment recommendations.  Refrain from alcohol and " Called to inform patient that her visit will be rescheduled with a surgeon .  Requested a callback to find a date that is acceptable for the patient.     non-prescribed drugs.  If there is a concern for safety, call 911.    Resources:   Crisis Intervention: 296.232.7940 or 725-094-2932 (TTY: 700.720.2979).  Call anytime for help.  [place] on Mental [place]ness (www.[place].megan.org): 298.955.1257 or 490-937-7565.  Alcoholics Anonymous (www.alcoholics-anonymous.org): Check your phone book for your local chapter.  Suicide Awareness Voices of Education (SAVE) (www.save.org): 717-795-PIOV (1324)  National Suicide Prevention Line (www.mentalhealthmn.org): 546-033-IBQE (4016)  Mental Health Consumer/Survivor Network of [place] (www.mhcsn.net): 317.851.1234 or 129-465-9373  Self- Management and Recovery Training., [employer]-- Toll free: 400.112.4703  www.[facility] [employer].org  [facility] Crisis (COPE) Response - Adult 852 486-1949      The treatment team has appreciated the opportunity to work with you.     Please take care and make your recovery a daily recovery.   If you have any questions or concerns our unit number is [phone].  Thank you.

## 2025-06-11 DIAGNOSIS — Z98.890 POSTOPERATIVE STATE: Primary | ICD-10-CM

## 2025-06-11 RX ORDER — OXYCODONE HYDROCHLORIDE 5 MG/1
5 TABLET ORAL EVERY 6 HOURS PRN
Qty: 12 TABLET | Refills: 0 | Status: SHIPPED | OUTPATIENT
Start: 2025-06-11 | End: 2025-06-14

## 2025-06-11 RX ORDER — ONDANSETRON 4 MG/1
4 TABLET, ORALLY DISINTEGRATING ORAL EVERY 8 HOURS PRN
Qty: 12 TABLET | Refills: 0 | Status: SHIPPED | OUTPATIENT
Start: 2025-06-11

## 2025-06-12 ENCOUNTER — TRANSFERRED RECORDS (OUTPATIENT)
Dept: HEALTH INFORMATION MANAGEMENT | Facility: CLINIC | Age: 40
End: 2025-06-12

## 2025-06-19 ENCOUNTER — OFFICE VISIT (OUTPATIENT)
Dept: PLASTIC SURGERY | Facility: CLINIC | Age: 40
End: 2025-06-19
Payer: COMMERCIAL

## 2025-06-19 DIAGNOSIS — Z98.890 POSTOPERATIVE STATE: Primary | ICD-10-CM

## 2025-06-19 NOTE — PROGRESS NOTES
Pt came into clinic today POD7 s/p Septorhinoplasty, Repair of Nasal Vestibular Stenosis & Bilateral Inferior Turbinate Reduction (6/12/25).    Nasal cast and bilateral Yang splints removed. Pt's nose is appropriately swollen and tender.    Bilateral nasal passageways suctioned of excess mucous and crusting.    Pt pleased with improvement in nasal breathing post splint removal.    Pt instructed to maintain post-op restrictions for one more week and to continue with frequent use of nasal spray.    Follow-up in 4-6 weeks.    Tara Key RN  06/19/25 4:08 PM     10

## 2025-07-19 ENCOUNTER — HEALTH MAINTENANCE LETTER (OUTPATIENT)
Age: 40
End: 2025-07-19

## 2025-08-25 ENCOUNTER — OFFICE VISIT (OUTPATIENT)
Dept: PLASTIC SURGERY | Facility: CLINIC | Age: 40
End: 2025-08-25
Payer: COMMERCIAL

## 2025-08-25 DIAGNOSIS — J34.89 NASAL OBSTRUCTION: Primary | ICD-10-CM
